# Patient Record
Sex: MALE | Race: WHITE | Employment: OTHER | URBAN - METROPOLITAN AREA
[De-identification: names, ages, dates, MRNs, and addresses within clinical notes are randomized per-mention and may not be internally consistent; named-entity substitution may affect disease eponyms.]

---

## 2017-05-01 PROBLEM — R06.00 PND (PAROXYSMAL NOCTURNAL DYSPNEA): Status: ACTIVE | Noted: 2017-05-01

## 2019-07-12 PROBLEM — I50.23 ACUTE ON CHRONIC SYSTOLIC CONGESTIVE HEART FAILURE (HCC): Status: ACTIVE | Noted: 2019-07-12

## 2019-07-12 PROBLEM — Z95.1 HX OF CABG: Status: ACTIVE | Noted: 2019-07-12

## 2019-07-13 ENCOUNTER — HOSPITAL ENCOUNTER (INPATIENT)
Age: 84
LOS: 6 days | Discharge: NURSING, REHAB OR LONG-TERM CARE | DRG: 242 | End: 2019-07-19
Attending: INTERNAL MEDICINE | Admitting: INTERNAL MEDICINE
Payer: MEDICARE

## 2019-07-13 ENCOUNTER — APPOINTMENT (OUTPATIENT)
Dept: GENERAL RADIOLOGY | Age: 84
DRG: 242 | End: 2019-07-13
Attending: NURSE PRACTITIONER
Payer: MEDICARE

## 2019-07-13 PROBLEM — R00.1 SYMPTOMATIC BRADYCARDIA: Status: ACTIVE | Noted: 2019-07-13

## 2019-07-13 PROBLEM — R00.1 BRADYCARDIA: Status: ACTIVE | Noted: 2019-07-13

## 2019-07-13 LAB
ANION GAP SERPL CALC-SCNC: 6 MMOL/L (ref 7–16)
APPEARANCE UR: ABNORMAL
APTT PPP: 99.7 SEC (ref 24.7–39.8)
ATRIAL RATE: 61 BPM
BACTERIA URNS QL MICRO: NORMAL /HPF
BILIRUB UR QL: NEGATIVE
BUN SERPL-MCNC: 18 MG/DL (ref 8–23)
CALCIUM SERPL-MCNC: 8.2 MG/DL (ref 8.3–10.4)
CALCULATED P AXIS, ECG09: 45 DEGREES
CALCULATED R AXIS, ECG10: -42 DEGREES
CALCULATED T AXIS, ECG11: -104 DEGREES
CASTS URNS QL MICRO: 0 /LPF
CHLORIDE SERPL-SCNC: 103 MMOL/L (ref 98–107)
CO2 SERPL-SCNC: 26 MMOL/L (ref 21–32)
COLOR UR: ABNORMAL
CREAT SERPL-MCNC: 1.07 MG/DL (ref 0.8–1.5)
CRYSTALS URNS QL MICRO: 0 /LPF
DIAGNOSIS, 93000: NORMAL
EPI CELLS #/AREA URNS HPF: 0 /HPF
ERYTHROCYTE [DISTWIDTH] IN BLOOD BY AUTOMATED COUNT: 15.5 % (ref 11.9–14.6)
GLUCOSE SERPL-MCNC: 86 MG/DL (ref 65–100)
GLUCOSE UR STRIP.AUTO-MCNC: NEGATIVE MG/DL
HCT VFR BLD AUTO: 37.9 % (ref 41.1–50.3)
HGB BLD-MCNC: 12.7 G/DL (ref 13.6–17.2)
HGB UR QL STRIP: ABNORMAL
INR PPP: 2
KETONES UR QL STRIP.AUTO: ABNORMAL MG/DL
LEUKOCYTE ESTERASE UR QL STRIP.AUTO: ABNORMAL
MAGNESIUM SERPL-MCNC: 2.5 MG/DL (ref 1.8–2.4)
MCH RBC QN AUTO: 29.7 PG (ref 26.1–32.9)
MCHC RBC AUTO-ENTMCNC: 33.5 G/DL (ref 31.4–35)
MCV RBC AUTO: 88.8 FL (ref 79.6–97.8)
MUCOUS THREADS URNS QL MICRO: 0 /LPF
NITRITE UR QL STRIP.AUTO: POSITIVE
NRBC # BLD: 0 K/UL (ref 0–0.2)
OTHER OBSERVATIONS,UCOM: NORMAL
P-R INTERVAL, ECG05: 222 MS
PH UR STRIP: 5.5 [PH] (ref 5–9)
PLATELET # BLD AUTO: 87 K/UL (ref 150–450)
PMV BLD AUTO: 13 FL (ref 9.4–12.3)
POTASSIUM SERPL-SCNC: 4.3 MMOL/L (ref 3.5–5.1)
PROT UR STRIP-MCNC: 100 MG/DL
PROTHROMBIN TIME: 22.2 SEC (ref 11.7–14.5)
Q-T INTERVAL, ECG07: 616 MS
QRS DURATION, ECG06: 186 MS
QTC CALCULATION (BEZET), ECG08: 514 MS
RBC # BLD AUTO: 4.27 M/UL (ref 4.23–5.6)
RBC #/AREA URNS HPF: >100 /HPF
SODIUM SERPL-SCNC: 135 MMOL/L (ref 136–145)
SP GR UR REFRACTOMETRY: 1.02 (ref 1–1.02)
TROPONIN I SERPL-MCNC: 0.89 NG/ML (ref 0.02–0.05)
TROPONIN I SERPL-MCNC: 0.93 NG/ML (ref 0.02–0.05)
TROPONIN I SERPL-MCNC: 1.02 NG/ML (ref 0.02–0.05)
TSH SERPL DL<=0.005 MIU/L-ACNC: 5.29 UIU/ML (ref 0.36–3.74)
UROBILINOGEN UR QL STRIP.AUTO: 1 EU/DL (ref 0.2–1)
VENTRICULAR RATE, ECG03: 42 BPM
WBC # BLD AUTO: 3.6 K/UL (ref 4.3–11.1)
WBC URNS QL MICRO: NORMAL /HPF
YEAST URNS QL MICRO: NORMAL

## 2019-07-13 PROCEDURE — 83735 ASSAY OF MAGNESIUM: CPT

## 2019-07-13 PROCEDURE — 02HL3JZ INSERTION OF PACEMAKER LEAD INTO LEFT VENTRICLE, PERCUTANEOUS APPROACH: ICD-10-PCS | Performed by: INTERNAL MEDICINE

## 2019-07-13 PROCEDURE — 94760 N-INVAS EAR/PLS OXIMETRY 1: CPT

## 2019-07-13 PROCEDURE — 02HK3JZ INSERTION OF PACEMAKER LEAD INTO RIGHT VENTRICLE, PERCUTANEOUS APPROACH: ICD-10-PCS | Performed by: INTERNAL MEDICINE

## 2019-07-13 PROCEDURE — 65660000000 HC RM CCU STEPDOWN

## 2019-07-13 PROCEDURE — 80048 BASIC METABOLIC PNL TOTAL CA: CPT

## 2019-07-13 PROCEDURE — 02H63JZ INSERTION OF PACEMAKER LEAD INTO RIGHT ATRIUM, PERCUTANEOUS APPROACH: ICD-10-PCS | Performed by: INTERNAL MEDICINE

## 2019-07-13 PROCEDURE — 77030021907 HC KT URIN FOL O&M -A

## 2019-07-13 PROCEDURE — 74011250637 HC RX REV CODE- 250/637: Performed by: NURSE PRACTITIONER

## 2019-07-13 PROCEDURE — 93005 ELECTROCARDIOGRAM TRACING: CPT | Performed by: NURSE PRACTITIONER

## 2019-07-13 PROCEDURE — 81015 MICROSCOPIC EXAM OF URINE: CPT

## 2019-07-13 PROCEDURE — 85730 THROMBOPLASTIN TIME PARTIAL: CPT

## 2019-07-13 PROCEDURE — 87086 URINE CULTURE/COLONY COUNT: CPT

## 2019-07-13 PROCEDURE — 77030034849

## 2019-07-13 PROCEDURE — 81003 URINALYSIS AUTO W/O SCOPE: CPT

## 2019-07-13 PROCEDURE — 85610 PROTHROMBIN TIME: CPT

## 2019-07-13 PROCEDURE — 71046 X-RAY EXAM CHEST 2 VIEWS: CPT

## 2019-07-13 PROCEDURE — 0JH606Z INSERTION OF PACEMAKER, DUAL CHAMBER INTO CHEST SUBCUTANEOUS TISSUE AND FASCIA, OPEN APPROACH: ICD-10-PCS | Performed by: INTERNAL MEDICINE

## 2019-07-13 PROCEDURE — 36415 COLL VENOUS BLD VENIPUNCTURE: CPT

## 2019-07-13 PROCEDURE — 84484 ASSAY OF TROPONIN QUANT: CPT

## 2019-07-13 PROCEDURE — 74011250636 HC RX REV CODE- 250/636: Performed by: INTERNAL MEDICINE

## 2019-07-13 PROCEDURE — 85027 COMPLETE CBC AUTOMATED: CPT

## 2019-07-13 PROCEDURE — 77010033678 HC OXYGEN DAILY

## 2019-07-13 PROCEDURE — 84443 ASSAY THYROID STIM HORMONE: CPT

## 2019-07-13 RX ORDER — ISOSORBIDE MONONITRATE 30 MG/1
30 TABLET, EXTENDED RELEASE ORAL DAILY
Status: DISCONTINUED | OUTPATIENT
Start: 2019-07-13 | End: 2019-07-19 | Stop reason: HOSPADM

## 2019-07-13 RX ORDER — ACETAMINOPHEN 325 MG/1
650 TABLET ORAL
Status: DISCONTINUED | OUTPATIENT
Start: 2019-07-13 | End: 2019-07-15 | Stop reason: SDUPTHER

## 2019-07-13 RX ORDER — GUAIFENESIN 100 MG/5ML
200 SOLUTION ORAL
Status: DISCONTINUED | OUTPATIENT
Start: 2019-07-13 | End: 2019-07-19 | Stop reason: HOSPADM

## 2019-07-13 RX ORDER — NITROGLYCERIN 0.4 MG/1
0.4 TABLET SUBLINGUAL
Status: DISCONTINUED | OUTPATIENT
Start: 2019-07-13 | End: 2019-07-19 | Stop reason: HOSPADM

## 2019-07-13 RX ORDER — MORPHINE SULFATE 2 MG/ML
2 INJECTION, SOLUTION INTRAMUSCULAR; INTRAVENOUS
Status: DISCONTINUED | OUTPATIENT
Start: 2019-07-13 | End: 2019-07-19 | Stop reason: HOSPADM

## 2019-07-13 RX ORDER — HEPARIN SODIUM 5000 [USP'U]/100ML
12-25 INJECTION, SOLUTION INTRAVENOUS
Status: DISCONTINUED | OUTPATIENT
Start: 2019-07-13 | End: 2019-07-15

## 2019-07-13 RX ORDER — NALOXONE HYDROCHLORIDE 0.4 MG/ML
0.4 INJECTION, SOLUTION INTRAMUSCULAR; INTRAVENOUS; SUBCUTANEOUS AS NEEDED
Status: DISCONTINUED | OUTPATIENT
Start: 2019-07-13 | End: 2019-07-19 | Stop reason: HOSPADM

## 2019-07-13 RX ORDER — WARFARIN SODIUM 5 MG/1
5 TABLET ORAL EVERY EVENING
Status: DISCONTINUED | OUTPATIENT
Start: 2019-07-13 | End: 2019-07-13

## 2019-07-13 RX ORDER — SODIUM CHLORIDE 0.9 % (FLUSH) 0.9 %
5-40 SYRINGE (ML) INJECTION EVERY 8 HOURS
Status: DISCONTINUED | OUTPATIENT
Start: 2019-07-13 | End: 2019-07-17

## 2019-07-13 RX ORDER — FUROSEMIDE 10 MG/ML
40 INJECTION INTRAMUSCULAR; INTRAVENOUS DAILY
Status: DISCONTINUED | OUTPATIENT
Start: 2019-07-14 | End: 2019-07-19 | Stop reason: HOSPADM

## 2019-07-13 RX ORDER — MAG HYDROX/ALUMINUM HYD/SIMETH 200-200-20
30 SUSPENSION, ORAL (FINAL DOSE FORM) ORAL
Status: DISCONTINUED | OUTPATIENT
Start: 2019-07-13 | End: 2019-07-19 | Stop reason: HOSPADM

## 2019-07-13 RX ORDER — DIPHENHYDRAMINE HCL 25 MG
50 CAPSULE ORAL
Status: DISCONTINUED | OUTPATIENT
Start: 2019-07-13 | End: 2019-07-19 | Stop reason: HOSPADM

## 2019-07-13 RX ORDER — DOXAZOSIN 1 MG/1
2 TABLET ORAL DAILY
Status: DISCONTINUED | OUTPATIENT
Start: 2019-07-13 | End: 2019-07-17

## 2019-07-13 RX ORDER — LISINOPRIL 5 MG/1
5 TABLET ORAL DAILY
Status: DISCONTINUED | OUTPATIENT
Start: 2019-07-14 | End: 2019-07-18

## 2019-07-13 RX ORDER — SIMVASTATIN 20 MG/1
20 TABLET, FILM COATED ORAL
Status: DISCONTINUED | OUTPATIENT
Start: 2019-07-13 | End: 2019-07-19 | Stop reason: HOSPADM

## 2019-07-13 RX ORDER — SODIUM CHLORIDE 0.9 % (FLUSH) 0.9 %
5-40 SYRINGE (ML) INJECTION AS NEEDED
Status: DISCONTINUED | OUTPATIENT
Start: 2019-07-13 | End: 2019-07-17

## 2019-07-13 RX ADMIN — GUAIFENESIN 200 MG: 100 SOLUTION ORAL at 23:28

## 2019-07-13 RX ADMIN — Medication 10 ML: at 01:39

## 2019-07-13 RX ADMIN — HEPARIN SODIUM 12 UNITS/KG/HR: 5000 INJECTION, SOLUTION INTRAVENOUS at 12:15

## 2019-07-13 RX ADMIN — SIMVASTATIN 20 MG: 20 TABLET, FILM COATED ORAL at 21:11

## 2019-07-13 RX ADMIN — Medication 5 ML: at 21:12

## 2019-07-13 RX ADMIN — Medication 10 ML: at 06:11

## 2019-07-13 NOTE — PROGRESS NOTES
Patient with yellow non-skid socks in place, call light within reach, bed in low locked position with rails up x2. Bed alarm on. Verbalizes understanding to call for any assistance. Problem: Falls - Risk of  Goal: *Absence of Falls  Description  Document Jeff Bustos Fall Risk and appropriate interventions in the flowsheet. Outcome: Progressing Towards Goal     Pt has intermittent AV Block.    Problem: Arrhythmia Pathway (Adult)  Goal: *Absence of arrhythmia  Outcome: Progressing Towards Goal

## 2019-07-13 NOTE — H&P
[facility] CARDIOLOGY History &Physical                 Primary Cardiologist: Dr. Tony Wang    Primary Care Physician: Dr. Trish Edmonds ([facility])    Admitting Physician: Dr. Tony Wang    Subjective:     Patient is a 82 y.o.  male with PMHx of AVR (s/p SJM mechanical 1990), CAD (s/p CABG), HTN, BPH and intermittent CHB/Mobitz II (previously asymptomatic and declined PPM) who presents in transfer from [facility] with bradycardia and vomiting. History is limited due to sign-language computer not working. An  is in route to the hospital. Pt is able to read and communicate through writing. He was just seen in the office by Dr. Tony Wang on 7/12/19 at 11:15AM and it was recommended that he be admitted for further w/u and poss PPM vs ICD for his high-grade AVB -- he refused admit. Per Dr. Tony Wang note:     Lisa Lazo is a 82 y.o. male seen for a follow up visit regarding the following: The patient has a hx of AVR ,CAD,HTN,and intermittent CHB. He has refused pacemaker implant in the past due to his \"asymptomatic condition\". Recently,he returned for scheduled follow up and reported severe fatigue,worsening weakness,limited endurance, occasional dizziness,and severe MCKEON. Echo on 7-5-2019 showed LV EF=20-25%,normal fx AVR,mild to moderate AVR,severe TR with RVSP=63 mmHg. The patient is deaf and his examination was aided by a . PLAN:   1. Complete heart block (HCC)/High grade AV block persists. Appears to be a Mobitz 2 AV block with severe irreversible symptomatic bradycardia. I advised admission th hospital and pacemaker /?ICD ( bivertricular device with CRT). Again,he declines admission but states that he will consider over the weekend and call on 7-15-19 for elective admission if he consents. If not,EP appointment ASAP. The patient has a hx of high grade intermittent CHB and Mobitz 2 AVB since 2016. In the past ,he has refused pacemaker implantation.     2. Atherosclerosis of native coronary artery of native heart without angina pectoris:Status unknown. Reports no angina. Consider a C to evaluate ischemic etiology for severe LV systolic dysfunction. Echo in 2016 reported LV EF was 40-45%.     3. Essential hypertension:stable.     4. S/P AVR:Stable on Warfarin.     5. Hx of CABG:No angina     6. Acute on chronic systolic congestive heart failure (HCC):Consider adding Entresto and Aldactone if renal fx is ok. Continue Lasix. Add Coreg or Toprol after ICD or pacemaker. Consider [facility] to assess CAD and graft patency. Past Medical History:   Diagnosis Date    Abnormal EKG 2014    Coronary atherosclerosis of native coronary vessel 2012    Essential hypertension 2014    H/O prosthetic aortic valve replacement     HLD (hyperlipidemia) 2012    Malignant neoplasm of prostate ([facility] [address] 2012    PND (paroxysmal nocturnal dyspnea) 2017    S/P AVR 2016    Second-degree heart block; MOBITZ I; WENCKEB 2016    Slow pulse     Valvular heart disease       Past Surgical History:   Procedure Laterality Date    HX AORTIC VALVE REPLACEMENT  1990    at [facility]      HX HEART VALVE SURGERY        Allergies   Allergen Reactions    Procaine Unknown (comments)     Social History     Tobacco Use    Smoking status: Former Smoker     Packs/day: 1.00     Years: 15.00     Pack years: 15.00     Last attempt to quit: 1988     Years since quittin.0    Smokeless tobacco: Never Used    Tobacco comment: pack every 3-4 days   Substance Use Topics    Alcohol use: No      FH: No family history on file.      Review of Systems -- unable to fully obtain due to lack of sign-language services at present      Objective:       Visit Vitals  /55 (BP 1 Location: Right arm, BP Patient Position: At rest)   Pulse 64   Temp 98 °F (36.7 °C)   Resp 18   Ht 5' 4\" (1.626 m)   Wt 63.5 kg (140 lb 1.6 oz)   SpO2 94%   BMI 24.05 kg/m²       No intake/output data recorded. No intake/output data recorded. Physical Exam:  General: Well Developed, Well Nourished, No Acute Distress, resting comfortably in bed   HEENT: pupils equal and round, no abnormalities noted, deaf  Neck: supple, no JVD, no carotid bruits  Heart: S1S2 with RRR with bradycardia, soft murmurs, no gallops  Lungs: Clear throughout auscultation bilaterally without adventitious sounds  Abd: soft, nontender, nondistended, with good bowel sounds  Ext: warm, 2+ edema, calves supple/nontender, pulses 2+ bilaterally  Skin: warm and dry  Psychiatric: Normal mood and affect  Neurologic: Alert and oriented X 3, moves all extremities, CN intact    ECG: ordered on admit    Data Review:    No results found for this or any previous visit (from the past 24 hour(s)). CXR: Report from [facility] with small R pleural effusion vs PNA -- will repeat in AM    ECHO on 7-5-2019 showed LV EF=20-25%, normal fx AVR, mild to moderate AVR, severe TR with RVSP=63 mmHg. Assessment/Plan:   Principal Problem:    Symptomatic bradycardia -- admit to tele, f/u labs/lytes, had recent ECHO, needs PPM      Active Problems:    Essential hypertension -- monitor BP, stable presently      Coronary atherosclerosis of native coronary vessel -- hx CABG in past      HLD (hyperlipidemia) -- cont statin, update lipids      Second-degree heart block; MOBITZ I; WENCKEB -- noted for several years, however - he has previously been asymptomatic. Now appears to be symptomatic -- needs PPM      S/P AVR -- mechanical valve, normal valve function last ECHO, daily INRs, will plan to cont Coumadin but plan to hold prior to PPM placement (last dose PTA on 7/12), may need heparin bridge pending timing of device therapy        ADDENDUM: 0234  Sign- came to assist with communication.  Spent ~30min with Pt explaining his current heart function and HR and why he needs a PPM. Discussed that he has \"heart failure\" and Pt is surprised and states that he did not know he had heart failure. Discussed optimal therapy for heart failure limited by his HR and heart block. He v/u. He states that he is scared to have a PPM.     He adds to the HPI:  He has been having difficulty sleeping. He is scared to sleep because he wakes up gasping for breath and often gets up, walks around and sleeps/dozes in his recliner. He denies CP. He reports SOB, orthopnea, PND and BLE edema. Edema has been persistent for 3 months despite taking 2 Lasix tabs in AM and 1 in PM. He denies any palpitations or syncope. He has had some dizziness at times - not much recently. He had an episode of vomiting today that he felt was due to a box of food he ate as he vomited and had diarrhea shortly after. However, this scared him and his wife made him go to the ED.          JOHN Blum  7/13/2019  11:58 PM

## 2019-07-13 NOTE — PROGRESS NOTES
Discussed pt HR 28 with MD Molina and Mary Carmen YEE. MD Yesy Cornell to come and discuss plan of care with patient. Will call  when MD arrives. Received order to hold PO meds and continue to monitor. Zoll at bedside, pads on patient. Pt resting in bed, asymptomatic at this time, pt able to sign and speak with primary RN.

## 2019-07-13 NOTE — PROGRESS NOTES
Bedside and Verbal shift change report given to Teodoro Gonzalez RN (oncoming nurse) by self Limmie Crosser nurse). Report included the following information SBAR, Kardex and MAR. Zoll at bedside, pads placed on patient chest. Heparin gtt verified with oncoming RN. Called and spoke with , PTT and troponin have been drawn for , results pending. Oncoming RN assuming care.

## 2019-07-13 NOTE — PROGRESS NOTES
TRANSFER - IN REPORT:    Verbal report received from Lalito Enriquez on Reyes Jones being received from [facility] for routine progression of care      Report consisted of patients Situation, Background, Assessment and Recommendations(SBAR). Information from the following report(s) SBAR, Kardex, ED Summary, Intake/Output, Recent Results and Cardiac Rhythm AV Block was reviewed with the receiving nurse. Opportunity for questions and clarification was provided. Assessment completed upon patients arrival to unit and care assumed. Pt attached to telemetry monitor. Pt asymptomatic, denies CP, nausea. Zoll pads on pt with low HR. Zoll at bedside. Oriented pt to room and the use of call light. Communicated with pt on the importance of calling for assistance prior to ambulation using paper and pen. Pt verbalized understanding. Bed alarm on.

## 2019-07-13 NOTE — PROGRESS NOTES
Dual skin assessment completed upon admission. It reveals the followin/13/19 0114   Skin Integumentary   Skin Integumentary (WDL) WDL    Pressure  Injury Documentation No Pressure Injury Noted-Pressure Ulcer Prevention Initiated   Skin Color Appropriate for ethnicity; Ecchymosis (comment)  (scattered bruisings BUE)   Skin Condition/Temp Dry;Warm;Fragile;Flaky   Skin Integrity Cracked  (heels dry and slightly cracked)   Turgor Epidermis thin w/ loss of subcut tissue   Hair Growth Present   Varicosities Absent     Skin is fragile. Sacrum and heels are intact. Heels are dry and flaky. Sacrum is intact without breakdown. Scattered bruising noted BUE. Trace/pitting 1+ edema noted BLE.

## 2019-07-13 NOTE — PROGRESS NOTES
[facility] CARDIOLOGY PROGRESS NOTE           7/13/2019 11:05 AM    Admit Date: 7/13/2019      Subjective:   Feels ok    ROS:  GEN:  No fever or chills  Cardiovascular:  As noted above:no CP. Complains of MCKEON  Pulmonary:  As noted above:MCKEON occurs. Neuro:  No new focal motor or sensory loss    Objective:      Vitals:    07/13/19 0111 07/13/19 0523 07/13/19 0720 07/13/19 0910   BP: 137/55 124/58  136/50   Pulse: 64 (!) 41  (!) 28   Resp: 18 18  17   Temp: 98 °F (36.7 °C) 97.9 °F (36.6 °C)  97.5 °F (36.4 °C)   SpO2: 94% 94% 96% 98%   Weight:       Height:           Physical Exam:  General-no distress  Neck- supple, no JVD  CV-  Slow irregular rate and rhythm with 1/6 YORDAN  Lung- clear bilaterally  Abd- soft, nontender, nondistended  Ext- 1-2+ edema bilaterally. Skin- warm and dry  Psychiatric:  Normal mood and affect. Neurologic:  Alert and oriented X 3      Data Review:   Recent Labs     07/13/19  0145   *   K 4.3   MG 2.5*   BUN 18   CREA 1.07   GLU 86   WBC 3.6*   HGB 12.7*   HCT 37.9*   PLT 87*   INR 2.0       TELEMETRY:  Complete third degree AVblock. Assessment/Plan:     Principal Problem:    Symptomatic bradycardia (7/13/2019):Agreeable to pacer or ICD on Monday by EP. Active Problems:    Essential hypertension (1/9/2014): Stable adding Lisinopril. Coronary atherosclerosis of native coronary vessel (7/26/2012):no angina. Consider future [facility] with worsening EF. HLD (hyperlipidemia) (7/26/2012)      Second-degree heart block; MOBITZ I; WENCKEB (6/29/2016)      S/P AVR (11/1/2016): Stopping Warfarin and starting heparin with anticipation of pacer/ICD. Recent echo showed normal fx. Bradycardia (7/13/2019):Complete heart block is present. Pacemaker or ICD on Monday. Systolic CHF with underlying severe cardiomyopathy with LV EF<25%. . Add Lisinopril and Lasix. BMP in am.            Colton Acosta MD  7/13/2019 11:05 AM

## 2019-07-13 NOTE — PROGRESS NOTES
Mendoza catheter placed per MD Jonel Mccloud, pt states he has not been able to urinate in 3-4 days. Patient had urge to void but denies any pressure or feelings of pain in his abdomen/bladder. Mendoza inserted, patient drained 350ml immediately of keshia foul smelling urine. Received order for cardiac diet and heparin gtt with no bolus. Coumadin held for possible pace maker procedure on Monday at the earliest.     Spoke with patient's sister in law on phone who had patient security code. Per  Sister in law Susannah Manzo patient's only other family is his wife who is also mute and deaf and does not drive or have a phone. Sister-in-law's number placed in chart.

## 2019-07-13 NOTE — PROGRESS NOTES
Notified MD Radha Abbasi of UA results and that patient having some blood in urine, urine in grimes bag clear red. MD Radha Abbasi went to bedside to assess, no new orders at this time. MD aware patient on heparin gtt.

## 2019-07-13 NOTE — PROGRESS NOTES
Verbal bedside report received from Kathrin Galvan RN. Assumed care of patient. Heparin IV drip verified at bedside with outgoing RN. Zoll pads intact and on pt. Zoll at bedside.

## 2019-07-13 NOTE — PROGRESS NOTES
Troponin resulted 1.02. Patient denies any chest pain/pressure. Paged MD Ruba Alberto to notify him. Waiting on return call. Received order to get troponin 8 hours after last troponin. MD made aware that patient states he had not urinated for 3-4 days prior to admission and grimes insertion. Urine was keshia and foul smelling. Received order to obtain UA and urine culture.

## 2019-07-13 NOTE — PROGRESS NOTES
Bedside and Verbal shift change report given to self (oncoming nurse) by Teodoro Gonzalez RN (offgoing nurse). Report included the following information SBAR, Kardex and MAR. Pt resting in bed, zoll at bedside, HR sinus isac on monitor HR 29-30's.

## 2019-07-14 LAB
ANION GAP SERPL CALC-SCNC: 7 MMOL/L (ref 7–16)
APTT PPP: 114.4 SEC (ref 24.7–39.8)
APTT PPP: 122.1 SEC (ref 24.7–39.8)
APTT PPP: 128.1 SEC (ref 24.7–39.8)
BUN SERPL-MCNC: 18 MG/DL (ref 8–23)
CALCIUM SERPL-MCNC: 8.1 MG/DL (ref 8.3–10.4)
CHLORIDE SERPL-SCNC: 102 MMOL/L (ref 98–107)
CHOLEST SERPL-MCNC: 109 MG/DL
CO2 SERPL-SCNC: 25 MMOL/L (ref 21–32)
CREAT SERPL-MCNC: 0.99 MG/DL (ref 0.8–1.5)
ERYTHROCYTE [DISTWIDTH] IN BLOOD BY AUTOMATED COUNT: 15.6 % (ref 11.9–14.6)
GLUCOSE SERPL-MCNC: 91 MG/DL (ref 65–100)
HCT VFR BLD AUTO: 39.3 % (ref 41.1–50.3)
HDLC SERPL-MCNC: 44 MG/DL (ref 40–60)
HDLC SERPL: 2.5 {RATIO}
HGB BLD-MCNC: 13.3 G/DL (ref 13.6–17.2)
INR PPP: 1.9
LDLC SERPL CALC-MCNC: 57.8 MG/DL
LIPID PROFILE,FLP: NORMAL
MCH RBC QN AUTO: 30 PG (ref 26.1–32.9)
MCHC RBC AUTO-ENTMCNC: 33.8 G/DL (ref 31.4–35)
MCV RBC AUTO: 88.5 FL (ref 79.6–97.8)
NRBC # BLD: 0 K/UL (ref 0–0.2)
PLATELET # BLD AUTO: 92 K/UL (ref 150–450)
PMV BLD AUTO: 13 FL (ref 9.4–12.3)
POTASSIUM SERPL-SCNC: 4.3 MMOL/L (ref 3.5–5.1)
PROTHROMBIN TIME: 21.5 SEC (ref 11.7–14.5)
RBC # BLD AUTO: 4.44 M/UL (ref 4.23–5.6)
SODIUM SERPL-SCNC: 134 MMOL/L (ref 136–145)
TRIGL SERPL-MCNC: 36 MG/DL (ref 35–150)
VLDLC SERPL CALC-MCNC: 7.2 MG/DL (ref 6–23)
WBC # BLD AUTO: 6.7 K/UL (ref 4.3–11.1)

## 2019-07-14 PROCEDURE — 80048 BASIC METABOLIC PNL TOTAL CA: CPT

## 2019-07-14 PROCEDURE — 74011250637 HC RX REV CODE- 250/637: Performed by: INTERNAL MEDICINE

## 2019-07-14 PROCEDURE — 74011250637 HC RX REV CODE- 250/637: Performed by: NURSE PRACTITIONER

## 2019-07-14 PROCEDURE — 80061 LIPID PANEL: CPT

## 2019-07-14 PROCEDURE — 85730 THROMBOPLASTIN TIME PARTIAL: CPT

## 2019-07-14 PROCEDURE — 36415 COLL VENOUS BLD VENIPUNCTURE: CPT

## 2019-07-14 PROCEDURE — 85027 COMPLETE CBC AUTOMATED: CPT

## 2019-07-14 PROCEDURE — 65660000000 HC RM CCU STEPDOWN

## 2019-07-14 PROCEDURE — 74011250636 HC RX REV CODE- 250/636: Performed by: INTERNAL MEDICINE

## 2019-07-14 PROCEDURE — 85610 PROTHROMBIN TIME: CPT

## 2019-07-14 RX ORDER — SIMETHICONE 80 MG
80 TABLET,CHEWABLE ORAL
Status: DISCONTINUED | OUTPATIENT
Start: 2019-07-14 | End: 2019-07-19 | Stop reason: HOSPADM

## 2019-07-14 RX ADMIN — FUROSEMIDE 40 MG: 10 INJECTION, SOLUTION INTRAMUSCULAR; INTRAVENOUS at 08:24

## 2019-07-14 RX ADMIN — LISINOPRIL 5 MG: 5 TABLET ORAL at 11:34

## 2019-07-14 RX ADMIN — DOXAZOSIN 2 MG: 1 TABLET ORAL at 08:25

## 2019-07-14 RX ADMIN — Medication 10 ML: at 06:03

## 2019-07-14 RX ADMIN — SIMETHICONE CHEW TAB 80 MG 80 MG: 80 TABLET ORAL at 23:52

## 2019-07-14 RX ADMIN — SIMVASTATIN 20 MG: 20 TABLET, FILM COATED ORAL at 22:10

## 2019-07-14 RX ADMIN — HEPARIN SODIUM 10 UNITS/KG/HR: 5000 INJECTION, SOLUTION INTRAVENOUS at 19:12

## 2019-07-14 RX ADMIN — ISOSORBIDE MONONITRATE 30 MG: 30 TABLET, EXTENDED RELEASE ORAL at 11:31

## 2019-07-14 RX ADMIN — ALUMINUM HYDROXIDE, MAGNESIUM HYDROXIDE, AND SIMETHICONE 30 ML: 200; 200; 20 SUSPENSION ORAL at 22:07

## 2019-07-14 RX ADMIN — Medication 5 ML: at 22:10

## 2019-07-14 NOTE — PROGRESS NOTES
MEWS score noted to be 3 due to low HR in 30s. Daniella Huber, charge RN informed and assessed patient. Patient resting in bed asymptomatic, denies distress. Pt has been in SB with intermittent heart block since admission. Zoll pads placed on pt with zoll at bedside. Will continue to monitor.

## 2019-07-14 NOTE — PROGRESS NOTES
Bedside and Verbal shift change report given to [address] [place] [address] (oncoming nurse) by self Lenard Olvera nurse). Report included the following information SBAR, Kardex and MAR. Heparin gtt verified.

## 2019-07-14 NOTE — PROGRESS NOTES
Patient with yellow non-skid socks in place, call light within reach, bed in low locked position with rails up x2. Bed alarm on. Verbalizes understanding to call for any assistance. Problem: Falls - Risk of  Goal: *Absence of Falls  Description  Document Radha Bonilla Fall Risk and appropriate interventions in the flowsheet. Outcome: Progressing Towards Goal     Pt asymptomatic, in SB with intermittent heart block. Problem: Arrhythmia Pathway (Adult)  Goal: *Absence of arrhythmia  Outcome: Progressing Towards Goal     Pt O2 sat >90s on RA.     Problem: Breathing Pattern - Ineffective  Goal: *Absence of hypoxia  Outcome: Progressing Towards Goal

## 2019-07-14 NOTE — PROGRESS NOTES
Verbal bedside report given to joshua Frank RN. Patient's situation, background, assessment and recommendations provided. Opportunity for questions provided. Oncoming RN assumed care of patient. Heparin IV drip verified at bedside with oncoming RN. Bed alarm on.

## 2019-07-14 NOTE — PROGRESS NOTES
[facility] CARDIOLOGY PROGRESS NOTE           7/14/2019 10:15 AM    Admit Date: 7/13/2019      Subjective:   No complaints. ROS:  GEN:  No fever or chills  Cardiovascular:  As noted above:no CP. MCKEON persists. Pulmonary:  As noted above:no cough. Neuro:  No new focal motor or sensory loss    Objective:      Vitals:    07/14/19 0055 07/14/19 0504 07/14/19 0815 07/14/19 0824   BP: 125/57 120/45 148/58 148/58   Pulse: (!) 36 (!) 33 (!) 39 (!) 38   Resp: 15 16  17   Temp: 99 °F (37.2 °C) 97.9 °F (36.6 °C)  98.7 °F (37.1 °C)   SpO2: 95% 96%  92%   Weight:  64.5 kg (142 lb 4.8 oz)     Height:           Physical Exam:  General-no distress  Neck- supple, no JVD  CV-irregular rate and rhythm with 1/6 YORDAN  Lung- clear bilaterally  Abd- soft, nontender, nondistended  Ext- 1+ edema bilaterally. Skin- warm and dry  Psychiatric:  Normal mood and affect. Neurologic:  Alert and oriented X 3      Data Review:   Recent Labs     07/14/19  0235 07/13/19  0145   * 135*   K 4.3 4.3   MG  --  2.5*   BUN 18 18   CREA 0.99 1.07   GLU 91 86   WBC 6.7 3.6*   HGB 13.3* 12.7*   HCT 39.3* 37.9*   PLT 92* 87*   INR 1.9 2.0   TRIGL 36  --    HDL 44  --        TELEMETRY: CHB    Assessment/Plan:     Principal Problem:    Symptomatic bradycardia (7/13/2019)/CHB:EP to see in am.Hopefully,pacer or ICD tomorrow. Active Problems:    Essential hypertension (1/9/2014). Continue current medications. Coronary atherosclerosis of native coronary vessel (7/26/2012). Continue current medications. Consider future ischemic workup with drop in LV EF. S/P AVR (11/1/2016): Off Warfarin. Continue heaprin. Function is stable on recent echo      Cardiomyopathy with symptomatic systolic CHF. Lisinopril and Lasix added.                 Diana Love MD  7/14/2019 10:15 AM

## 2019-07-14 NOTE — PROGRESS NOTES
Bedside and Verbal shift change report given to self (oncoming nurse) by Teodoro Gonzalez RN (offgoing nurse). Report included the following information SBAR, Kardex and MAR. Heparin gtt verified with offgoing RN, pt denies any pain or distress. Mendoza draining keshia/red colored urine. Will continue to monitor.

## 2019-07-15 ENCOUNTER — APPOINTMENT (OUTPATIENT)
Dept: GENERAL RADIOLOGY | Age: 84
DRG: 242 | End: 2019-07-15
Attending: INTERNAL MEDICINE
Payer: MEDICARE

## 2019-07-15 ENCOUNTER — ANESTHESIA EVENT (OUTPATIENT)
Dept: SURGERY | Age: 84
DRG: 242 | End: 2019-07-15
Payer: MEDICARE

## 2019-07-15 ENCOUNTER — ANESTHESIA (OUTPATIENT)
Dept: SURGERY | Age: 84
DRG: 242 | End: 2019-07-15
Payer: MEDICARE

## 2019-07-15 LAB
ANION GAP SERPL CALC-SCNC: 7 MMOL/L (ref 7–16)
APTT PPP: 103.7 SEC (ref 24.7–39.8)
APTT PPP: 40.5 SEC (ref 24.7–39.8)
APTT PPP: 95.2 SEC (ref 24.7–39.8)
ATRIAL RATE: 41 BPM
BUN SERPL-MCNC: 14 MG/DL (ref 8–23)
CALCIUM SERPL-MCNC: 8 MG/DL (ref 8.3–10.4)
CALCULATED R AXIS, ECG10: -116 DEGREES
CALCULATED T AXIS, ECG11: 48 DEGREES
CHLORIDE SERPL-SCNC: 103 MMOL/L (ref 98–107)
CO2 SERPL-SCNC: 26 MMOL/L (ref 21–32)
CREAT SERPL-MCNC: 0.87 MG/DL (ref 0.8–1.5)
DIAGNOSIS, 93000: NORMAL
ERYTHROCYTE [DISTWIDTH] IN BLOOD BY AUTOMATED COUNT: 15.7 % (ref 11.9–14.6)
GLUCOSE SERPL-MCNC: 83 MG/DL (ref 65–100)
HCT VFR BLD AUTO: 38.2 % (ref 41.1–50.3)
HGB BLD-MCNC: 12.6 G/DL (ref 13.6–17.2)
INR PPP: 1.8
MCH RBC QN AUTO: 30 PG (ref 26.1–32.9)
MCHC RBC AUTO-ENTMCNC: 33 G/DL (ref 31.4–35)
MCV RBC AUTO: 91 FL (ref 79.6–97.8)
NRBC # BLD: 0 K/UL (ref 0–0.2)
P-R INTERVAL, ECG05: 182 MS
PLATELET # BLD AUTO: 85 K/UL (ref 150–450)
PMV BLD AUTO: 13.1 FL (ref 9.4–12.3)
POTASSIUM SERPL-SCNC: 4 MMOL/L (ref 3.5–5.1)
PROTHROMBIN TIME: 20.3 SEC (ref 11.7–14.5)
Q-T INTERVAL, ECG07: 508 MS
QRS DURATION, ECG06: 162 MS
QTC CALCULATION (BEZET), ECG08: 515 MS
RBC # BLD AUTO: 4.2 M/UL (ref 4.23–5.6)
SODIUM SERPL-SCNC: 136 MMOL/L (ref 136–145)
VENTRICULAR RATE, ECG03: 62 BPM
WBC # BLD AUTO: 5 K/UL (ref 4.3–11.1)

## 2019-07-15 PROCEDURE — 85730 THROMBOPLASTIN TIME PARTIAL: CPT

## 2019-07-15 PROCEDURE — 80048 BASIC METABOLIC PNL TOTAL CA: CPT

## 2019-07-15 PROCEDURE — 36415 COLL VENOUS BLD VENIPUNCTURE: CPT

## 2019-07-15 PROCEDURE — 85027 COMPLETE CBC AUTOMATED: CPT

## 2019-07-15 PROCEDURE — 77030018547 HC SUT ETHBND1 J&J -B

## 2019-07-15 PROCEDURE — C2621 PMKR, OTHER THAN SING/DUAL: HCPCS

## 2019-07-15 PROCEDURE — C1898 LEAD, PMKR, OTHER THAN TRANS: HCPCS

## 2019-07-15 PROCEDURE — 76060000035 HC ANESTHESIA 2 TO 2.5 HR: Performed by: INTERNAL MEDICINE

## 2019-07-15 PROCEDURE — 74011636320 HC RX REV CODE- 636/320: Performed by: INTERNAL MEDICINE

## 2019-07-15 PROCEDURE — 94760 N-INVAS EAR/PLS OXIMETRY 1: CPT

## 2019-07-15 PROCEDURE — C1892 INTRO/SHEATH,FIXED,PEEL-AWAY: HCPCS

## 2019-07-15 PROCEDURE — 74011250636 HC RX REV CODE- 250/636

## 2019-07-15 PROCEDURE — A4565 SLINGS: HCPCS

## 2019-07-15 PROCEDURE — 74011250637 HC RX REV CODE- 250/637: Performed by: NURSE PRACTITIONER

## 2019-07-15 PROCEDURE — 77030019605

## 2019-07-15 PROCEDURE — 77030012935 HC DRSG AQUACEL BMS -B

## 2019-07-15 PROCEDURE — C1894 INTRO/SHEATH, NON-LASER: HCPCS

## 2019-07-15 PROCEDURE — 93005 ELECTROCARDIOGRAM TRACING: CPT | Performed by: INTERNAL MEDICINE

## 2019-07-15 PROCEDURE — 77030037400 HC ADH TISS HI VISC EXOFIN CHMP -B

## 2019-07-15 PROCEDURE — C1751 CATH, INF, PER/CENT/MIDLINE: HCPCS

## 2019-07-15 PROCEDURE — 77030022704 HC SUT VLOC COVD -B

## 2019-07-15 PROCEDURE — C1769 GUIDE WIRE: HCPCS

## 2019-07-15 PROCEDURE — 71045 X-RAY EXAM CHEST 1 VIEW: CPT

## 2019-07-15 PROCEDURE — 65660000000 HC RM CCU STEPDOWN

## 2019-07-15 PROCEDURE — 74011250637 HC RX REV CODE- 250/637: Performed by: INTERNAL MEDICINE

## 2019-07-15 PROCEDURE — 74011000272 HC RX REV CODE- 272: Performed by: INTERNAL MEDICINE

## 2019-07-15 PROCEDURE — C1900 LEAD, CORONARY VENOUS: HCPCS

## 2019-07-15 PROCEDURE — 33225 L VENTRIC PACING LEAD ADD-ON: CPT

## 2019-07-15 PROCEDURE — 74011000250 HC RX REV CODE- 250: Performed by: INTERNAL MEDICINE

## 2019-07-15 PROCEDURE — 85610 PROTHROMBIN TIME: CPT

## 2019-07-15 PROCEDURE — C1887 CATHETER, GUIDING: HCPCS

## 2019-07-15 PROCEDURE — 33208 INSRT HEART PM ATRIAL & VENT: CPT

## 2019-07-15 PROCEDURE — 74011250636 HC RX REV CODE- 250/636: Performed by: INTERNAL MEDICINE

## 2019-07-15 PROCEDURE — 36005 INJECTION EXT VENOGRAPHY: CPT

## 2019-07-15 RX ORDER — PROPOFOL 10 MG/ML
INJECTION, EMULSION INTRAVENOUS AS NEEDED
Status: DISCONTINUED | OUTPATIENT
Start: 2019-07-15 | End: 2019-07-15 | Stop reason: HOSPADM

## 2019-07-15 RX ORDER — ONDANSETRON 2 MG/ML
4 INJECTION INTRAMUSCULAR; INTRAVENOUS
Status: DISCONTINUED | OUTPATIENT
Start: 2019-07-15 | End: 2019-07-19 | Stop reason: HOSPADM

## 2019-07-15 RX ORDER — WARFARIN SODIUM 5 MG/1
5 TABLET ORAL EVERY EVENING
Status: DISCONTINUED | OUTPATIENT
Start: 2019-07-15 | End: 2019-07-19 | Stop reason: HOSPADM

## 2019-07-15 RX ORDER — PROPOFOL 10 MG/ML
INJECTION, EMULSION INTRAVENOUS
Status: DISCONTINUED | OUTPATIENT
Start: 2019-07-15 | End: 2019-07-15 | Stop reason: HOSPADM

## 2019-07-15 RX ORDER — DOCUSATE SODIUM 100 MG/1
100 CAPSULE, LIQUID FILLED ORAL
Status: DISCONTINUED | OUTPATIENT
Start: 2019-07-15 | End: 2019-07-19 | Stop reason: HOSPADM

## 2019-07-15 RX ORDER — CARVEDILOL 3.12 MG/1
3.12 TABLET ORAL 2 TIMES DAILY WITH MEALS
Status: DISCONTINUED | OUTPATIENT
Start: 2019-07-15 | End: 2019-07-15

## 2019-07-15 RX ORDER — SODIUM CHLORIDE 0.9 % (FLUSH) 0.9 %
5-40 SYRINGE (ML) INJECTION AS NEEDED
Status: DISCONTINUED | OUTPATIENT
Start: 2019-07-15 | End: 2019-07-19 | Stop reason: HOSPADM

## 2019-07-15 RX ORDER — SODIUM CHLORIDE, SODIUM LACTATE, POTASSIUM CHLORIDE, CALCIUM CHLORIDE 600; 310; 30; 20 MG/100ML; MG/100ML; MG/100ML; MG/100ML
INJECTION, SOLUTION INTRAVENOUS
Status: DISCONTINUED | OUTPATIENT
Start: 2019-07-15 | End: 2019-07-15 | Stop reason: HOSPADM

## 2019-07-15 RX ORDER — CARVEDILOL 6.25 MG/1
6.25 TABLET ORAL 2 TIMES DAILY WITH MEALS
Status: DISCONTINUED | OUTPATIENT
Start: 2019-07-16 | End: 2019-07-18

## 2019-07-15 RX ORDER — CEFAZOLIN SODIUM 1 G/3ML
INJECTION, POWDER, FOR SOLUTION INTRAMUSCULAR; INTRAVENOUS AS NEEDED
Status: DISCONTINUED | OUTPATIENT
Start: 2019-07-15 | End: 2019-07-15 | Stop reason: HOSPADM

## 2019-07-15 RX ORDER — LIDOCAINE HYDROCHLORIDE 20 MG/ML
INJECTION, SOLUTION EPIDURAL; INFILTRATION; INTRACAUDAL; PERINEURAL AS NEEDED
Status: DISCONTINUED | OUTPATIENT
Start: 2019-07-15 | End: 2019-07-15 | Stop reason: HOSPADM

## 2019-07-15 RX ORDER — SODIUM CHLORIDE 0.9 % (FLUSH) 0.9 %
5-40 SYRINGE (ML) INJECTION EVERY 8 HOURS
Status: DISCONTINUED | OUTPATIENT
Start: 2019-07-15 | End: 2019-07-19 | Stop reason: HOSPADM

## 2019-07-15 RX ORDER — BUPIVACAINE HYDROCHLORIDE AND EPINEPHRINE 5; 5 MG/ML; UG/ML
60 INJECTION, SOLUTION EPIDURAL; INTRACAUDAL; PERINEURAL ONCE
Status: COMPLETED | OUTPATIENT
Start: 2019-07-15 | End: 2019-07-15

## 2019-07-15 RX ORDER — HYDROCODONE BITARTRATE AND ACETAMINOPHEN 5; 325 MG/1; MG/1
1 TABLET ORAL
Status: DISCONTINUED | OUTPATIENT
Start: 2019-07-15 | End: 2019-07-19 | Stop reason: HOSPADM

## 2019-07-15 RX ORDER — ACETAMINOPHEN 325 MG/1
650 TABLET ORAL
Status: DISCONTINUED | OUTPATIENT
Start: 2019-07-15 | End: 2019-07-19 | Stop reason: HOSPADM

## 2019-07-15 RX ADMIN — IOPAMIDOL 50 ML: 755 INJECTION, SOLUTION INTRAVENOUS at 15:19

## 2019-07-15 RX ADMIN — Medication 10 ML: at 22:15

## 2019-07-15 RX ADMIN — ISOSORBIDE MONONITRATE 30 MG: 30 TABLET, EXTENDED RELEASE ORAL at 08:29

## 2019-07-15 RX ADMIN — SODIUM CHLORIDE, SODIUM LACTATE, POTASSIUM CHLORIDE, CALCIUM CHLORIDE: 600; 310; 30; 20 INJECTION, SOLUTION INTRAVENOUS at 13:31

## 2019-07-15 RX ADMIN — CARVEDILOL 3.12 MG: 3.12 TABLET, FILM COATED ORAL at 17:34

## 2019-07-15 RX ADMIN — PROPOFOL 100 MCG/KG/MIN: 10 INJECTION, EMULSION INTRAVENOUS at 13:37

## 2019-07-15 RX ADMIN — BUPIVACAINE HYDROCHLORIDE AND EPINEPHRINE BITARTRATE 150 MG: 5; .005 INJECTION, SOLUTION EPIDURAL; INTRACAUDAL; PERINEURAL at 13:37

## 2019-07-15 RX ADMIN — LISINOPRIL 5 MG: 5 TABLET ORAL at 08:30

## 2019-07-15 RX ADMIN — DOXAZOSIN 2 MG: 1 TABLET ORAL at 08:29

## 2019-07-15 RX ADMIN — WARFARIN SODIUM 5 MG: 5 TABLET ORAL at 17:34

## 2019-07-15 RX ADMIN — FUROSEMIDE 40 MG: 10 INJECTION, SOLUTION INTRAMUSCULAR; INTRAVENOUS at 08:30

## 2019-07-15 RX ADMIN — BACITRACIN: 50000 INJECTION, POWDER, FOR SOLUTION INTRAMUSCULAR at 13:37

## 2019-07-15 RX ADMIN — PROPOFOL 20 MG: 10 INJECTION, EMULSION INTRAVENOUS at 13:37

## 2019-07-15 RX ADMIN — LIDOCAINE HYDROCHLORIDE 80 MG: 20 INJECTION, SOLUTION EPIDURAL; INFILTRATION; INTRACAUDAL; PERINEURAL at 13:37

## 2019-07-15 RX ADMIN — Medication 5 ML: at 17:41

## 2019-07-15 RX ADMIN — CEFAZOLIN SODIUM 2 G: 1 INJECTION, POWDER, FOR SOLUTION INTRAMUSCULAR; INTRAVENOUS at 13:43

## 2019-07-15 RX ADMIN — HYDROCODONE BITARTRATE AND ACETAMINOPHEN 1 TABLET: 5; 325 TABLET ORAL at 22:14

## 2019-07-15 RX ADMIN — SIMVASTATIN 20 MG: 20 TABLET, FILM COATED ORAL at 22:14

## 2019-07-15 NOTE — PROGRESS NOTES
available 24/7 using the [facility] [employer] for Video Remote Interpreting (VRI)   For on-site interpreters please call [phone]. After hours and weekends, please call the on-call number.

## 2019-07-15 NOTE — PROGRESS NOTES
Care Management Interventions  PCP Verified by CM: Yes(has appointment with new PCP August 19)  Palliative Care Criteria Met (RRAT>21 & CHF Dx)?: No(RRAT 13 Dx Bradycardia )  Transition of Care Consult (CM Consult): Discharge Planning  Discharge Durable Medical Equipment: No(none)  Physical Therapy Consult: No  Occupational Therapy Consult: No  Speech Therapy Consult: No  Current Support Network: Lives with Spouse  Confirm Follow Up Transport: Self  Plan discussed with Pt/Family/Caregiver: Yes  Freedom of Choice Offered: Yes  Discharge Location  Discharge Placement: Home  Met with patient and his sister for d/c planning. Patient alert and oriented and is deaf. He can read written word and lips. He is independent of his ADL's and lives with his wife in mobile home. He requires no DME at home and has needed transportation. He has Medicare and [employer] for medications. Current d/c plan is home with wife when medically stable. CM following.

## 2019-07-15 NOTE — PROGRESS NOTES
TRANSFER - IN REPORT:    Verbal report received from [facility] RN(name) on Marino Harmon  being received from EP lab(unit) for routine progression of care      Report consisted of patients Situation, Background, Assessment and   Recommendations(SBAR). Information from the following report(s) Procedure Summary was reviewed with the receiving nurse. Opportunity for questions and clarification was provided. Assessment completed upon patients arrival to unit and care assumed.

## 2019-07-15 NOTE — PROGRESS NOTES
TRANSFER - IN REPORT:    Verbal report received from [facility] Room on Jaiden Espinosa being received from [facility] for routine progression of care. Report consisted of patients Situation, Background, Assessment and Recommendations(SBAR). Information from the following report(s) SBAR, Kardex, Procedure Summary and MAR was reviewed. Opportunity for questions and clarification was provided.

## 2019-07-15 NOTE — PROGRESS NOTES
To request a  after 4:30 p.m., please call Christiana at [phone]. Thank you,      Beatrice Canales, [address] [facility] [address] /  [email or web address] c: [phone] / [address] / [place], [address] [facility]  [email or web address]

## 2019-07-15 NOTE — PROGRESS NOTES
Bedside and verbal shift report received from [place] [id number] [facility].  Heparin gtt verified at bedside

## 2019-07-15 NOTE — PROGRESS NOTES
Warfarin dosing per pharmacist    Delsa Gitelman is a 82 y.o. male.     Height: 5' 4\" (162.6 cm)    Weight: 64.1 kg (141 lb 4.8 oz)    Indication:  AVR ([facility] mechanical valve 1990)    Goal INR:  2-3    Home dose:  5mg po qpm    Risk factors or significant drug interactions:  none    Other anticoagulants:  none    Daily Monitoring  Date  INR     Warfarin dose HGB              Notes  7/15  1.8  5mg  12.6      Will monitor INR daily and make adjustments as needed      Thank you,  Maddi Arcos, PharmD  Pharmacist  [phone]

## 2019-07-15 NOTE — ANESTHESIA POSTPROCEDURE EVALUATION
Procedure(s):  BIV ICD.     total IV anesthesia    Anesthesia Post Evaluation      Multimodal analgesia: multimodal analgesia used between 6 hours prior to anesthesia start to PACU discharge  Patient location during evaluation: PACU  Patient participation: complete - patient participated  Level of consciousness: awake and alert  Pain management: adequate  Airway patency: patent  Anesthetic complications: no  Cardiovascular status: acceptable  Respiratory status: acceptable  Hydration status: acceptable  Post anesthesia nausea and vomiting:  none      Vitals Value Taken Time   /62 7/15/2019  5:34 PM   Temp     Pulse 72 7/15/2019  5:34 PM   Resp     SpO2

## 2019-07-15 NOTE — PROGRESS NOTES
[facility] CARDIOLOGY PROGRESS NOTE           7/15/2019 1:29 PM    Admit Date: 7/13/2019    Admit Diagnosis: Bradycardia [R00.1]    Assessment:   Principal Problem:    Symptomatic bradycardia (7/13/2019)    Active Problems:    Essential hypertension (1/9/2014)      Coronary atherosclerosis of native coronary vessel (7/26/2012)      HLD (hyperlipidemia) (7/26/2012)      High degree AV block, complete heart block       S/P AVR (11/1/2016)      Bradycardia (7/13/2019)        LBBB       Deafness     Plan:   1. Severe NICM, LBBB, complete heart block - pt with class 1 indication for BiV pacemaker. With reduced EF, he also has an indication for an ICD but he does not want a defibrillator. Given his LV dyssynchrony, we will plan for CRT-P for resynchronization. 2. NICM - GDMT as tolerated, can start beta blocker post device. 3. Deafness - use of  services for consenting was performed. PM/CRT-P  I discussed in detail the potential benefits of PM/CRT therapy with AV adam ablation, including definitive rate control with possible improved mortality, prevention of SCD,  decreased hospitalization, beneficial cardiac remodeling, and prevention of disease progression. Additionally, I discussed with the patient the potential risks of PM/CRT implantation, including the risk of bleeding, infection, venous occlusion, DVT/PE, pneumothorax, cardiac tamponade, perforation, need for urgent open heart surgery, device/lead failure or lead dislodgement with pacemaker dependence, inability to place an LV lead via the coronary sinus, heart attack, stroke, arrhythmia, radiation skin injury, kidney damage/failure, oversedation, respiratory arrest, and even death. The patient understands these risks in the context of the potential benefits of the device implantation and subsequent ablation, and agrees to proceed.       Thank you for allowing me to participate in the electrophysiologic care of this most pleasant patient. Please feel free to contact me if there are any questions or concerns. Liam Mckeon. Raven Esquivel MD, MS  Clinical Cardiac Electrophysiology  [facility]    Subjective:   No complaints this AM, no chest pain or shortness of breath    Echo: 7/8/2019, EF 20-25%, mildly dilated LA, severely dilated RA, moderate LV hypokinesis    Interval History: (History of pertinent interval events obtained from nursing staff)    ROS:  GEN:  No fever or chills  Cardiovascular:  As noted above  Pulmonary:  As noted above  Neuro:  No new focal motor or sensory loss      Objective:     Vitals:    07/14/19 2049 07/15/19 0054 07/15/19 0508 07/15/19 1031   BP: 126/62 135/55 126/58 113/50   Pulse: (!) 41 (!) 38 (!) 36 (!) 40   Resp: 15 15 15 18   Temp: 98.9 °F (37.2 °C) 98.7 °F (37.1 °C) 97.9 °F (36.6 °C) 96.8 °F (36 °C)   SpO2: 92% 91% 96% 92%   Weight:   141 lb 4.8 oz (64.1 kg)    Height:           Physical Exam:  General-Well Developed, Well Nourished, No Acute Distress, Alert & Oriented x 3, appropriate mood. Neck- supple, no JVD  CV- bradycardia and rhythm no MRG  Lung- clear bilaterally  Abd- soft, nontender, nondistended  Ext- no edema bilaterally.   Skin- warm and dry    Current Facility-Administered Medications   Medication Dose Route Frequency    bacitracin 50,000 Units, neomycin-polymyxin B  2 mL in sterile water irrigation 1,000 mL irrigation   Irrigation ONCE    bupivacaine-EPINEPHrine (PF) (SENSORCAINE PF) 0.5 %-1:200,000 injection 300 mg  60 mL SubCUTAneous ONCE    iopamidol (ISOVUE-370) 76 % injection 50 mL  50 mL IntraVENous RAD ONCE    simethicone (MYLICON) tablet 80 mg  80 mg Oral QID PRN    doxazosin (CARDURA) tablet 2 mg  2 mg Oral DAILY    isosorbide mononitrate ER (IMDUR) tablet 30 mg  30 mg Oral DAILY    simvastatin (ZOCOR) tablet 20 mg  20 mg Oral QHS    sodium chloride (NS) flush 5-40 mL  5-40 mL IntraVENous Q8H    sodium chloride (NS) flush 5-40 mL  5-40 mL IntraVENous PRN    nitroglycerin (NITROSTAT) tablet 0.4 mg  0.4 mg SubLINGual Q5MIN PRN    morphine injection 2 mg  2 mg IntraVENous Q4H PRN    acetaminophen (TYLENOL) tablet 650 mg  650 mg Oral Q4H PRN    naloxone (NARCAN) injection 0.4 mg  0.4 mg IntraVENous PRN    alum-mag hydroxide-simeth (MYLANTA) oral suspension 30 mL  30 mL Oral Q4H PRN    diphenhydrAMINE (BENADRYL) capsule 50 mg  50 mg Oral QHS PRN    heparin 25,000 units in dextrose 500 mL infusion  12-25 Units/kg/hr IntraVENous TITRATE    furosemide (LASIX) injection 40 mg  40 mg IntraVENous DAILY    lisinopril (PRINIVIL, ZESTRIL) tablet 5 mg  5 mg Oral DAILY    guaiFENesin (ROBITUSSIN) 100 mg/5 mL oral liquid 200 mg  200 mg Oral Q6H PRN       Data Review:   Recent Results (from the past 24 hour(s))   PTT    Collection Time: 07/14/19  4:09 PM   Result Value Ref Range    aPTT 128.1 (H) 24.7 - 39.8 SEC   PTT    Collection Time: 07/14/19 11:52 PM   Result Value Ref Range    aPTT 95.2 (H) 24.7 - 39.8 SEC   PROTHROMBIN TIME + INR    Collection Time: 07/15/19  4:13 AM   Result Value Ref Range    Prothrombin time 20.3 (H) 11.7 - 14.5 sec    INR 1.8     METABOLIC PANEL, BASIC    Collection Time: 07/15/19  4:13 AM   Result Value Ref Range    Sodium 136 136 - 145 mmol/L    Potassium 4.0 3.5 - 5.1 mmol/L    Chloride 103 98 - 107 mmol/L    CO2 26 21 - 32 mmol/L    Anion gap 7 7 - 16 mmol/L    Glucose 83 65 - 100 mg/dL    BUN 14 8 - 23 MG/DL    Creatinine 0.87 0.8 - 1.5 MG/DL    GFR est AA >60 >60 ml/min/1.73m2    GFR est non-AA >60 >60 ml/min/1.73m2    Calcium 8.0 (L) 8.3 - 10.4 MG/DL   CBC W/O DIFF    Collection Time: 07/15/19  4:13 AM   Result Value Ref Range    WBC 5.0 4.3 - 11.1 K/uL    RBC 4.20 (L) 4.23 - 5.6 M/uL    HGB 12.6 (L) 13.6 - 17.2 g/dL    HCT 38.2 (L) 41.1 - 50.3 %    MCV 91.0 79.6 - 97.8 FL    MCH 30.0 26.1 - 32.9 PG    MCHC 33.0 31.4 - 35.0 g/dL    RDW 15.7 (H) 11.9 - 14.6 %    PLATELET 85 (L) 428 - 450 K/uL    MPV 13.1 (H) 9.4 - 12.3 FL    ABSOLUTE NRBC 0.00 0.0 - 0.2 K/uL   PTT    Collection Time: 07/15/19  7:13 AM   Result Value Ref Range    aPTT 103.7 (H) 24.7 - 39.8 SEC       EKG:  (EKG has been independently visualized by me with interpretation below): High degree AV block, likely complete heart block.

## 2019-07-15 NOTE — PROCEDURES
Attending:  Malika Lynne. Vanessa Becker MD    Referring: Radha Cam MD      Pre-Procedure Diagnosis  1. Chronic systolic heart failure, ejection fraction of 20-25%  2. Nonischemic dilated cardiomyopathy. 3. Class II heart failure symptoms. 4. Life expectancy greater than 1 year. 5. Complete heart block    Procedure Performed  1. Insertion of Biotronik biventricular pacemaker  2. Insertion of left ventricular lead at time of new system Implantation. Anesthesia: MAC     Estimated Blood Loss: Less than 10 mL     Specimens: * No specimens in log *     Complications: None     Fluoroscopy Time: 9.8 minutes / 111 mGy    Contrast: 65 cc       The procedure, indications, risks, benefits, and alternatives were discussed with the patient and family members, who desired to proceed after questions were answered and informed consent was documented. Procedure: After informed consent was obtained, the patient was brought to the Electrophysiology Laboratory in a fasting state and was prepped and draped in sterile fashion. Prophylactic antibiotic was administered 10 minutes prior to skin incision (refer to anesthesia procedural documentation for details). MAC was administered by the anesthesia team with continuous oxygen saturation measurement and blood pressure measurement. A venogram of the left axillary and left subclavian was performed which demonstrated no significant obstruction throughout its course. Local anesthetic (sensorcaine) was delivered to the left pectoral region and an incision was made parallel to the deltopectoral groove. A subcutaneous pocket was created using blunt dissection and electrocautery, and adequate hemostasis was established. Access x 3 was obtained under fluoroscopic guidance using a modified Seldinger technique with placement of two short wires and one long wire down into the RA and advanced below the diaphragm. Next, placement of a peel-away sheath over the first guidewire was performed.   A permanent RV pace/sense lead was then advanced under fluoroscopic guidance into the RV apex in a stable position with satisfactory sensing and pacing characteristics. The peel away sheath was removed. Another Safe-sheath was then placed over the second guidewire. A permanent pacing lead was advanced under fluoroscopic guidance and positioned in the right atrium at the location of the right atrial appendage. Stable RA appendage position with satisfactory sensing and pacing characteristics was obtained. The sheath was peeled. The leads were sutured to the underlying pectoralis fascia using 2 non-absorbable sutures around each lead's collar. The lead slack and position was assessed under fluoroscopy while securing the lead collars to ensure proper length. After positioning the RA and RV leads, a standard Merit (TM) Preston Sevin LV delivery sheath was advanced over the long access wire and dilator and wire were removed. The braided inner core sheath was then advanced into the Preston Sevin and used to cannulate the coronary sinus using contrast when necessary. A Versacore wire and was used to allow a smooth transition into the CS body. A coronary sinus venogram was then performed using a balloon occluding catheter. A Merit renal inner sheath was used with an Whisper to cannulate a posterolateral branch. The renal was advanced into the posterolateral and a subselected venogram was performed. The left ventricular lead was then advanced into a high posterolateral brach over a Whisper wire. Adequate thresholds were obtained with an adequate margin between phrenic stim and loss of capture. The delivery sheaths were then slit and/or peeled with fluoroscopy demonstrating stable position. The lead was then sutured to the underlying pectoralis fascia using 2 non-absorbable sutures around the lead collar. Final lead testing via the pacing system analyzer (PSA) demonstrated stable sensing, impedance and pacing thresholds.  The lead pins were then cleaned with antibiotic soaked gauze, dried gently, and attached to a new defibrillator generator. Pins were directly observed to pass the tip electrode, and the ring hex wrench screws were secured, and leads tug tested. The pocket was irrigated copiously with a saline antimicrobial solution. The device and leads were gently positioned within the pocket. The wound was closed with three layers of absorbable suture using 2-0, 3-0 and 4-0 V-Loc in a running fashion. Exofin solution was placed over the wound, allowed to dry, and then a sterile Aquacel dressing was placed over the wound. Fluoroscopic images were interpreted by me, in multiple projections. The patient tolerated the procedure well and left the lab in good condition. All sharps and sponges were counted x 2 and no complications were noted. Device and Lead Information  Pulse Generator Model #  Serial # Location Implant/Explant   [id number] Biotronik [id number] Left Pectoral Implant     Lead Model Number  Serial Number Lead position Implant/Explant   RA [id number] Biotronik [id number] RA Appendage Implant   RV [id number] Biotronik [id number] RV Van Implant   LV [id number] Biotronik [id number] LV Lateral Implant     Lead Sensitivity and Threshold  Lead R or P sensitivity (mv) Threshold (V) Threshold PW (msec) Impedance (ohms) Final output Voltage (V) Final PW (msec)   RA 3.1 0.5 0.4 448 3.6 0.4   RV 10.8 0.6 0.4 507 4.8 0.4   LV 9.0 0.5 0.4 643 2.0 0.4     Bradycardia Settings  Mateo Mode LRL URL Pace AVD (ms) Sense AVD (ms) Rate Response Mode Switching Mode SW Rate   DDD-CLS 60 120 175 135 On On 160     LV Pacing Configuration: LV1tip? [id number]      IMPRESSION: Successful implantation of Biotronik biventricular pacemaker. MRI conditional.     PLAN:  -Overnight observation.  -Device clinic follow up in 1-2 weeks.   -Routine cardiac care per Dr. Ruba Alberto. Nayely Pabon.  Dorcas Curry MD, ite Santos 89  Clinical Cardiac Electrophysiology  [facility]    7/15/2019  3:42 PM

## 2019-07-15 NOTE — PROGRESS NOTES
Bedside and Verbal shift change report given to [facility], RN (oncoming nurse) by [facility] AT [facility], RN (offgoing nurse). Report included the following information SBAR, Kardex, Accordion and Recent Results.

## 2019-07-15 NOTE — PROGRESS NOTES
Problem: Falls - Risk of  Goal: *Absence of Falls  Description  Document [facility] Fall Risk and appropriate interventions in the flowsheet.   Outcome: Progressing Towards Goal  Note:   Fall Risk Interventions:  Mobility Interventions: Bed/chair exit alarm, Patient to call before getting OOB    Medication Interventions: Patient to call before getting OOB, Teach patient to arise slowly         History of Falls Interventions: Bed/chair exit alarm, Door open when patient unattended         Problem: Patient Education: Go to Patient Education Activity  Goal: Patient/Family Education  Outcome: Progressing Towards Goal     Problem: Arrhythmia Pathway (Adult)  Goal: *Absence of arrhythmia  Outcome: Progressing Towards Goal  Note:   Patient sinus isac in the 30's     Problem: Breathing Pattern - Ineffective  Goal: *Absence of hypoxia  Outcome: Progressing Towards Goal  Note:   Visit Vitals  /55 (BP 1 Location: Left arm, BP Patient Position: At rest)   Pulse (!) 38   Temp 98.7 °F (37.1 °C)   Resp 15   Ht 5' 4\" (1.626 m)   Wt 64.5 kg (142 lb 4.8 oz)   SpO2 91%   BMI 24.43 kg/m²       Goal: *Use of effective breathing techniques  Outcome: Progressing Towards Goal  Goal: *PALLIATIVE CARE:  Alleviation of Dyspnea  Outcome: Progressing Towards Goal     Problem: Patient Education: Go to Patient Education Activity  Goal: Patient/Family Education  Outcome: Progressing Towards Goal

## 2019-07-15 NOTE — PROGRESS NOTES
TRANSFER - OUT REPORT:    Verbal report given to [facility] RN(name) on Tiffanie Castillo  being transferred to [facility](unit) for routine progression of care       Report consisted of patients Situation, Background, Assessment and   Recommendations(SBAR). Information from the following report(s) Procedure Summary was reviewed with the receiving nurse. Lines:   Peripheral IV 07/13/19 Anterior;Proximal;Right Forearm (Active)   Site Assessment Clean, dry, & intact 7/15/2019  4:05 AM   Phlebitis Assessment 0 7/15/2019  4:05 AM   Infiltration Assessment 0 7/15/2019  4:05 AM   Dressing Status Clean, dry, & intact 7/15/2019  4:05 AM   Dressing Type Tape;Transparent 7/15/2019  4:05 AM   Hub Color/Line Status Infusing 7/15/2019  4:05 AM   Alcohol Cap Used No 7/14/2019  4:01 AM        Opportunity for questions and clarification was provided.       Patient transported with:   "[employer]"

## 2019-07-15 NOTE — ANESTHESIA PREPROCEDURE EVALUATION
Relevant Problems   No relevant active problems       Anesthetic History   No history of anesthetic complications            Review of Systems / Medical History  Patient summary reviewed and pertinent labs reviewed    Pulmonary  Within defined limits                 Neuro/Psych   Within defined limits           Cardiovascular    Hypertension: well controlled  Valvular problems/murmurs (s/p AVR in 1990s with mechanical valve)      Dysrhythmias (symptomatic bradycardia and intermittent Complete Heart Block)   CAD and hyperlipidemia    Exercise tolerance: <4 METS  Comments: Echo showed LVEF 20-25%, mild to moderate MR, severe TR with RVSP of 63mmHg   GI/Hepatic/Renal  Within defined limits              Endo/Other  Within defined limits           Other Findings              Physical Exam    Airway  Mallampati: II  TM Distance: 4 - 6 cm  Neck ROM: normal range of motion   Mouth opening: Normal     Cardiovascular        Systolic click       Dental  No notable dental hx       Pulmonary  Breath sounds clear to auscultation               Abdominal  GI exam deferred       Other Findings            Anesthetic Plan    ASA: 4  Anesthesia type: total IV anesthesia          Induction: Intravenous  Anesthetic plan and risks discussed with: Patient and Sibling       used for H&P and IC. INR 1.8 today.

## 2019-07-16 ENCOUNTER — APPOINTMENT (OUTPATIENT)
Dept: GENERAL RADIOLOGY | Age: 84
DRG: 242 | End: 2019-07-16
Attending: INTERNAL MEDICINE
Payer: MEDICARE

## 2019-07-16 LAB
ANION GAP SERPL CALC-SCNC: 6 MMOL/L (ref 7–16)
BACTERIA SPEC CULT: NORMAL
BASOPHILS # BLD: 0 K/UL (ref 0–0.2)
BASOPHILS NFR BLD: 0 % (ref 0–2)
BUN SERPL-MCNC: 14 MG/DL (ref 8–23)
CALCIUM SERPL-MCNC: 7.8 MG/DL (ref 8.3–10.4)
CHLORIDE SERPL-SCNC: 102 MMOL/L (ref 98–107)
CO2 SERPL-SCNC: 27 MMOL/L (ref 21–32)
CREAT SERPL-MCNC: 0.85 MG/DL (ref 0.8–1.5)
DIFFERENTIAL METHOD BLD: ABNORMAL
EOSINOPHIL # BLD: 0.1 K/UL (ref 0–0.8)
EOSINOPHIL NFR BLD: 1 % (ref 0.5–7.8)
ERYTHROCYTE [DISTWIDTH] IN BLOOD BY AUTOMATED COUNT: 15.6 % (ref 11.9–14.6)
GLUCOSE SERPL-MCNC: 78 MG/DL (ref 65–100)
HCT VFR BLD AUTO: 36.1 % (ref 41.1–50.3)
HGB BLD-MCNC: 12.2 G/DL (ref 13.6–17.2)
IMM GRANULOCYTES # BLD AUTO: 0 K/UL (ref 0–0.5)
IMM GRANULOCYTES NFR BLD AUTO: 0 % (ref 0–5)
INR PPP: 1.4
LYMPHOCYTES # BLD: 0.7 K/UL (ref 0.5–4.6)
LYMPHOCYTES NFR BLD: 16 % (ref 13–44)
MCH RBC QN AUTO: 30.4 PG (ref 26.1–32.9)
MCHC RBC AUTO-ENTMCNC: 33.8 G/DL (ref 31.4–35)
MCV RBC AUTO: 90 FL (ref 79.6–97.8)
MONOCYTES # BLD: 0.4 K/UL (ref 0.1–1.3)
MONOCYTES NFR BLD: 10 % (ref 4–12)
NEUTS SEG # BLD: 3 K/UL (ref 1.7–8.2)
NEUTS SEG NFR BLD: 73 % (ref 43–78)
NRBC # BLD: 0 K/UL (ref 0–0.2)
PLATELET # BLD AUTO: 101 K/UL (ref 150–450)
PMV BLD AUTO: 12.2 FL (ref 9.4–12.3)
POTASSIUM SERPL-SCNC: 4.1 MMOL/L (ref 3.5–5.1)
PROTHROMBIN TIME: 17.4 SEC (ref 11.7–14.5)
RBC # BLD AUTO: 4.01 M/UL (ref 4.23–5.6)
SERVICE CMNT-IMP: NORMAL
SODIUM SERPL-SCNC: 135 MMOL/L (ref 136–145)
WBC # BLD AUTO: 4.1 K/UL (ref 4.3–11.1)

## 2019-07-16 PROCEDURE — 74011250636 HC RX REV CODE- 250/636: Performed by: INTERNAL MEDICINE

## 2019-07-16 PROCEDURE — 74011250637 HC RX REV CODE- 250/637: Performed by: INTERNAL MEDICINE

## 2019-07-16 PROCEDURE — 74011250637 HC RX REV CODE- 250/637: Performed by: NURSE PRACTITIONER

## 2019-07-16 PROCEDURE — 97535 SELF CARE MNGMENT TRAINING: CPT

## 2019-07-16 PROCEDURE — 80048 BASIC METABOLIC PNL TOTAL CA: CPT

## 2019-07-16 PROCEDURE — 74011250636 HC RX REV CODE- 250/636: Performed by: NURSE PRACTITIONER

## 2019-07-16 PROCEDURE — 74011000302 HC RX REV CODE- 302: Performed by: INTERNAL MEDICINE

## 2019-07-16 PROCEDURE — 85610 PROTHROMBIN TIME: CPT

## 2019-07-16 PROCEDURE — 85025 COMPLETE CBC W/AUTO DIFF WBC: CPT

## 2019-07-16 PROCEDURE — 97530 THERAPEUTIC ACTIVITIES: CPT

## 2019-07-16 PROCEDURE — 65660000000 HC RM CCU STEPDOWN

## 2019-07-16 PROCEDURE — 97166 OT EVAL MOD COMPLEX 45 MIN: CPT

## 2019-07-16 PROCEDURE — 36415 COLL VENOUS BLD VENIPUNCTURE: CPT

## 2019-07-16 PROCEDURE — 71046 X-RAY EXAM CHEST 2 VIEWS: CPT

## 2019-07-16 PROCEDURE — 86580 TB INTRADERMAL TEST: CPT | Performed by: INTERNAL MEDICINE

## 2019-07-16 RX ORDER — ENOXAPARIN SODIUM 100 MG/ML
60 INJECTION SUBCUTANEOUS EVERY 12 HOURS
Status: DISCONTINUED | OUTPATIENT
Start: 2019-07-16 | End: 2019-07-16

## 2019-07-16 RX ADMIN — ISOSORBIDE MONONITRATE 30 MG: 30 TABLET, EXTENDED RELEASE ORAL at 10:28

## 2019-07-16 RX ADMIN — HYDROCODONE BITARTRATE AND ACETAMINOPHEN 1 TABLET: 5; 325 TABLET ORAL at 06:02

## 2019-07-16 RX ADMIN — LISINOPRIL 5 MG: 5 TABLET ORAL at 10:28

## 2019-07-16 RX ADMIN — CARVEDILOL 6.25 MG: 6.25 TABLET, FILM COATED ORAL at 17:48

## 2019-07-16 RX ADMIN — Medication 10 ML: at 06:03

## 2019-07-16 RX ADMIN — DOXAZOSIN 2 MG: 1 TABLET ORAL at 10:28

## 2019-07-16 RX ADMIN — SIMVASTATIN 20 MG: 20 TABLET, FILM COATED ORAL at 21:13

## 2019-07-16 RX ADMIN — Medication 10 ML: at 13:34

## 2019-07-16 RX ADMIN — ACETAMINOPHEN 650 MG: 325 TABLET, FILM COATED ORAL at 10:38

## 2019-07-16 RX ADMIN — Medication 10 ML: at 21:14

## 2019-07-16 RX ADMIN — Medication 20 ML: at 13:34

## 2019-07-16 RX ADMIN — WARFARIN SODIUM 2.5 MG: 2 TABLET ORAL at 10:28

## 2019-07-16 RX ADMIN — FUROSEMIDE 40 MG: 10 INJECTION, SOLUTION INTRAMUSCULAR; INTRAVENOUS at 10:28

## 2019-07-16 RX ADMIN — CARVEDILOL 6.25 MG: 6.25 TABLET, FILM COATED ORAL at 10:28

## 2019-07-16 RX ADMIN — Medication 10 ML: at 21:13

## 2019-07-16 RX ADMIN — TUBERCULIN PURIFIED PROTEIN DERIVATIVE 5 UNITS: 5 INJECTION, SOLUTION INTRADERMAL at 13:33

## 2019-07-16 RX ADMIN — ENOXAPARIN SODIUM 60 MG: 60 INJECTION SUBCUTANEOUS at 13:32

## 2019-07-16 RX ADMIN — WARFARIN SODIUM 5 MG: 5 TABLET ORAL at 17:48

## 2019-07-16 NOTE — PROGRESS NOTES
Warfarin dosing per pharmacist    Andie Mcneill is a 82 y.o. male. Height: 5' 4\" (162.6 cm)    Weight: 62.6 kg (138 lb)    Indication:  AVR ([facility] mechanical valve 1990)    Goal INR:  2-3    Home dose:  5mg po qpm    Risk factors or significant drug interactions:  none    Other anticoagulants: To start either Lovenox or heparin bridge per Nishi Encarnacion NP    Daily Monitoring  Date  INR     Warfarin dose HGB              Notes  7/15  1.8  5mg  12.6    7/16  1.4      2.5mg + 5mg 12.2    Patient admitted for pacemaker and warfarin was held and patient was bridged with heparin prior to procedure. Heparin stopped yesterday and warfarin restarted last night post procedure. INR subtherapeutic. Discussed bridge with Nishi Encarnacion NP. They will try to bridge with Lovenox so they can send patient home. Will give an additional dose of warfarin 2.5mg now and then continue 5mg evening dose (total of 7.5mg warfarin today.)    Will continue to follow daily.         Thank you,  Joanna Zheng, PharmD

## 2019-07-16 NOTE — PROGRESS NOTES
Followed up with patient but waiting on wife and friend to discuss d/c options. Nursing providing education for Lovenox injection. He may need rehab although he prefers home. PPD/PT/OT ordered for possible rehab need. Would need authorization from [facility] for SNF. CM following.

## 2019-07-16 NOTE — PROGRESS NOTES
[facility] CARDIOLOGY PROGRESS NOTE           7/16/2019 6:38 AM    Admit Date: 7/13/2019    Admit Diagnosis: Bradycardia [R00.1]      Subjective:   No complaints this AM, no chest pain or shortness of breath, appropriate post op device pocket pain    Interval History: (History of pertinent interval events obtained from nursing staff)  Cardiac device placed yesterday, no events overnight, no immediate complications    ROS:  GEN:  No fever or chills  Cardiovascular:  As noted above  Pulmonary:  As noted above  Neuro:  No new focal motor or sensory loss      Objective:     Vitals:    07/15/19 2131 07/16/19 0030 07/16/19 0511 07/16/19 0512   BP: 150/67 91/49 109/69    Pulse: 65 68 73    Resp: 16 18 18    Temp: 99.5 °F (37.5 °C) 99 °F (37.2 °C) 98.3 °F (36.8 °C)    SpO2: 91% 94% 96%    Weight:    62.6 kg (138 lb)   Height:           Physical Exam:  General-Well Developed, Well Nourished, No Acute Distress, Alert & Oriented x 3, appropriate mood. CV- regular rate and rhythm no MRG, left infraclavicular pocket with dressing in place, no evidence of hematoma, redness, warmth or excessive drainage  Lung- clear bilaterally  Abd- soft, nontender, nondistended  Ext- no edema bilaterally.     Current Facility-Administered Medications   Medication Dose Route Frequency    sodium chloride (NS) flush 5-40 mL  5-40 mL IntraVENous Q8H    sodium chloride (NS) flush 5-40 mL  5-40 mL IntraVENous PRN    acetaminophen (TYLENOL) tablet 650 mg  650 mg Oral Q4H PRN    HYDROcodone-acetaminophen (NORCO) 5-325 mg per tablet 1 Tab  1 Tab Oral Q4H PRN    ondansetron (ZOFRAN) injection 4 mg  4 mg IntraVENous Q4H PRN    docusate sodium (COLACE) capsule 100 mg  100 mg Oral BID PRN    warfarin (COUMADIN) tablet 5 mg  5 mg Oral QPM    carvedilol (COREG) tablet 6.25 mg  6.25 mg Oral BID WITH MEALS    simethicone (MYLICON) tablet 80 mg  80 mg Oral QID PRN    doxazosin (CARDURA) tablet 2 mg  2 mg Oral DAILY    isosorbide mononitrate ER (IMDUR) tablet 30 mg  30 mg Oral DAILY    simvastatin (ZOCOR) tablet 20 mg  20 mg Oral QHS    sodium chloride (NS) flush 5-40 mL  5-40 mL IntraVENous Q8H    sodium chloride (NS) flush 5-40 mL  5-40 mL IntraVENous PRN    nitroglycerin (NITROSTAT) tablet 0.4 mg  0.4 mg SubLINGual Q5MIN PRN    morphine injection 2 mg  2 mg IntraVENous Q4H PRN    naloxone (NARCAN) injection 0.4 mg  0.4 mg IntraVENous PRN    alum-mag hydroxide-simeth (MYLANTA) oral suspension 30 mL  30 mL Oral Q4H PRN    diphenhydrAMINE (BENADRYL) capsule 50 mg  50 mg Oral QHS PRN    furosemide (LASIX) injection 40 mg  40 mg IntraVENous DAILY    lisinopril (PRINIVIL, ZESTRIL) tablet 5 mg  5 mg Oral DAILY    guaiFENesin (ROBITUSSIN) 100 mg/5 mL oral liquid 200 mg  200 mg Oral Q6H PRN     Data Review:   Recent Results (from the past 24 hour(s))   PTT    Collection Time: 07/15/19  7:13 AM   Result Value Ref Range    aPTT 103.7 (H) 24.7 - 39.8 SEC   PTT    Collection Time: 07/15/19  5:28 PM   Result Value Ref Range    aPTT 40.5 (H) 24.7 - 39.8 SEC   EKG, 12 LEAD, INITIAL    Collection Time: 07/15/19  7:15 PM   Result Value Ref Range    Ventricular Rate 62 BPM    Atrial Rate 41 BPM    P-R Interval 182 ms    QRS Duration 162 ms    Q-T Interval 508 ms    QTC Calculation (Bezet) 515 ms    Calculated R Axis -116 degrees    Calculated T Axis 48 degrees    Diagnosis       AV dual-paced rhythm  Biventricular pacemaker detected  Abnormal ECG  When compared with ECG of 13-JUL-2019 01:44,  Significant changes have occurred  Confirmed by ST RENEE LENZ MD (), JOHNSON RODRIGUEZ ([id number]) on 7/15/2019 10:23:31 PM     CBC WITH AUTOMATED DIFF    Collection Time: 07/16/19  3:55 AM   Result Value Ref Range    WBC 4.1 (L) 4.3 - 11.1 K/uL    RBC 4.01 (L) 4.23 - 5.6 M/uL    HGB 12.2 (L) 13.6 - 17.2 g/dL    HCT 36.1 (L) 41.1 - 50.3 %    MCV 90.0 79.6 - 97.8 FL    MCH 30.4 26.1 - 32.9 PG    MCHC 33.8 31.4 - 35.0 g/dL    RDW 15.6 (H) 11.9 - 14.6 %    PLATELET 336 (L) 150 - 450 K/uL    MPV 12.2 9.4 - 12.3 FL    ABSOLUTE NRBC 0.00 0.0 - 0.2 K/uL    DF AUTOMATED      NEUTROPHILS 73 43 - 78 %    LYMPHOCYTES 16 13 - 44 %    MONOCYTES 10 4.0 - 12.0 %    EOSINOPHILS 1 0.5 - 7.8 %    BASOPHILS 0 0.0 - 2.0 %    IMMATURE GRANULOCYTES 0 0.0 - 5.0 %    ABS. NEUTROPHILS 3.0 1.7 - 8.2 K/UL    ABS. LYMPHOCYTES 0.7 0.5 - 4.6 K/UL    ABS. MONOCYTES 0.4 0.1 - 1.3 K/UL    ABS. EOSINOPHILS 0.1 0.0 - 0.8 K/UL    ABS. BASOPHILS 0.0 0.0 - 0.2 K/UL    ABS. IMM. GRANS. 0.0 0.0 - 0.5 K/UL   METABOLIC PANEL, BASIC    Collection Time: 07/16/19  3:55 AM   Result Value Ref Range    Sodium 135 (L) 136 - 145 mmol/L    Potassium 4.1 3.5 - 5.1 mmol/L    Chloride 102 98 - 107 mmol/L    CO2 27 21 - 32 mmol/L    Anion gap 6 (L) 7 - 16 mmol/L    Glucose 78 65 - 100 mg/dL    BUN 14 8 - 23 MG/DL    Creatinine 0.85 0.8 - 1.5 MG/DL    GFR est AA >60 >60 ml/min/1.73m2    GFR est non-AA >60 >60 ml/min/1.73m2    Calcium 7.8 (L) 8.3 - 10.4 MG/DL   PROTHROMBIN TIME + INR    Collection Time: 07/16/19  3:55 AM   Result Value Ref Range    Prothrombin time 17.4 (H) 11.7 - 14.5 sec    INR 1.4         EKG:  (EKG has been independently visualized by me with interpretation below)  Assessment:     Principal Problem:    Symptomatic bradycardia (7/13/2019)    Active Problems:    Essential hypertension (1/9/2014)      Coronary atherosclerosis of native coronary vessel (7/26/2012)      HLD (hyperlipidemia) (7/26/2012)      Second-degree heart block; MOBITZ I; 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 Cardiac device implantation: Pt device interrogation this AM reveals normal function, excellent pacing and sensing parameters, CXR without pneumothorax with excellent device position, no recognized complications  2. CHF: cont OMT, euvolemic today  3. Mechanical AV: on warfarin, INR subtherapeutic  4. Dispo: will need dispo plan    Star Lowery MD  Cardiology/Electrophysiology

## 2019-07-16 NOTE — PROGRESS NOTES
Met with patient, wife, and sister in law for d/c planning. Patient able to read written information and sister in law assist with communication. Have contacted  to make sure patient and wife understand information due to being deaf although patient able to response to questions and responds appropriately to written information. His wife has been at [facility] in past and they are interested in that facility or [facility]. Will send information to rehab once PT see's patient. He does not have [employer] has Medicare so once bed offer and 48 hr PPD read on Thursday could have possible rehab option.  CM following

## 2019-07-16 NOTE — PROGRESS NOTES
Problem: Self Care Deficits Care Plan (Adult)  Goal: *Acute Goals and Plan of Care (Insert Text)  Description  1. Pt will toilet with CGA   2. Pt will complete functional mobility for ADLs with CGA  3. Pt will complete lower body dressing with CGA using AE as needed  4. Pt will complete upper body bathing and dressing with SBA while maintaining PM precautions  5. Pt will independently demonstrate/ maintain 100% PM precautions during functional tasks  6. Pt will tolerate 23 minutes functional activity with min or fewer rest breaks to promote increased endurance for ADLs  7. Pt will complete bed mobility with CGA in prep for ADLs    Timeframe: 7 days     Outcome: Progressing Towards Goal     OCCUPATIONAL THERAPY: Initial Assessment and Daily Note 7/16/2019  INPATIENT: OT Visit Days: 1  [place] / [place] B / Product Type: Medicare /      NAME/AGE/GENDER: Lewis Mejias is a 82 y.o. male   PRIMARY DIAGNOSIS:  Bradycardia [R00.1] Symptomatic bradycardia   Symptomatic bradycardia    Procedure(s) (LRB):  BIV ICD (N/A)  1 Day Post-Op  ICD-10: Treatment Diagnosis:    Generalized Muscle Weakness (M62.81)   Precautions/Allergies:    Pacemaker, falls Procaine      ASSESSMENT:     Mr. Mathieu Disla was admitted with symptomatic bradycardia, now s/p PM placement yesterday. Pt is deaf, communicated in writing to ensure accuracy and carryover of information and education although pt appears to be fair at reading lips. Pt lives with his wife and is independent at baseline, does not use any DME. Pt's wife is not in good health and is not able to assist. This session, pt presented generally weak and deconditioned with deficits in mobility, balance, endurance, and limitations of PM precautions impacting ADLs. Pt educated on PM precautions and on adaptive techniques to maintain during ADLs and mobility for ADLs.  Pt verbalized understanding, however required min- mod multi modal cues for carryover during session. Pt required mod A for bed mobility, min A to scoot to the edge, mod A for socks. Min-mod A to don/ doff sling. Mod A to stand, min A for transfer to chair using RW. Pt fatigued quickly and c/o L groin pain. Pt is well below his functional baseline and would benefit from skilled OT services to address deficits to promote improved ADL safety and independence while maintaining precautions. Recommend d/c to SNF. This section established at most recent assessment   PROBLEM LIST (Impairments causing functional limitations):  Decreased Strength  Decreased ADL/Functional Activities  Decreased Transfer Abilities  Decreased Balance  Increased Pain  Decreased Activity Tolerance  Increased Fatigue  Decreased Knowledge of Precautions   INTERVENTIONS PLANNED: (Benefits and precautions of occupational therapy have been discussed with the patient.)  Activities of daily living training  Adaptive equipment training  Balance training  Therapeutic activity  Therapeutic exercise     TREATMENT PLAN: Frequency/Duration: Follow patient 3 times/ week to address above goals. Rehabilitation Potential For Stated Goals: Good     REHAB RECOMMENDATIONS (at time of discharge pending progress):    Placement: It is my opinion, based on this patient's performance to date, that Mr. Chloe Quinteros may benefit from intensive therapy at a [address] [place] [address] after discharge due to the functional deficits listed above that are likely to improve with skilled rehabilitation and concerns that he/she may be unsafe to be unsupervised at home due to impaired ADLs and mobility, fall risk .   Equipment:   BSC, RW               OCCUPATIONAL PROFILE AND HISTORY:   History of Present Injury/Illness (Reason for Referral):  See H&P  Past Medical History/Comorbidities:   Mr. Chloe Quinteros  has a past medical history of Abnormal EKG (1/9/2014), Coronary atherosclerosis of native coronary vessel (7/26/2012), Essential hypertension (1/9/2014), H/O prosthetic aortic valve replacement, HLD (hyperlipidemia) (7/26/2012), Malignant neoplasm of prostate ([facility] [address] (7/26/2012), PND (paroxysmal nocturnal dyspnea) (5/1/2017), S/P AVR (11/1/2016), Second-degree heart block; MOBITZ I; [place]  (6/29/2016), Slow pulse, and Valvular heart disease. Mr. Tyrone Meier  has a past surgical history that includes hx coronary artery bypass graft; hx aortic valve replacement (4/12/1990); and hx heart valve surgery. Social History/Living Environment:   Home Environment: Trailer/mobile home  One/Two Story Residence: One story  Living Alone: No  Support Systems: Spouse/Significant Other/Partner  Patient Expects to be Discharged to[de-identified] Trailer/mobile home  Current DME Used/Available at Home: None  Tub or Shower Type: Tub(Wife has a walk in shower that pt can access)  Prior Level of Function/Work/Activity:  Independent, lives w/ wife     Number of Personal Factors/Comorbidities that affect the Plan of Care: Expanded review of therapy/medical records (1-2):  MODERATE COMPLEXITY   ASSESSMENT OF OCCUPATIONAL PERFORMANCE[de-identified]   Activities of Daily Living:   Basic ADLs (From Assessment) Complex ADLs (From Assessment)   Feeding: Setup  Oral Facial Hygiene/Grooming: Setup  Bathing: Minimum assistance, Moderate assistance  Upper Body Dressing: Moderate assistance  Lower Body Dressing: Moderate assistance  Toileting: Minimum assistance, Moderate assistance Instrumental ADL  Meal Preparation: Maximum assistance  Homemaking: Maximum assistance   Grooming/Bathing/Dressing Activities of Daily Living   Grooming  Grooming Assistance: Contact guard assistance                         Bed/Mat Mobility  Supine to Sit: Moderate assistance  Sit to Stand: Minimum assistance; Moderate assistance  Stand to Sit: Minimum assistance  Bed to Chair: Minimum assistance  Scooting: Minimum assistance     Most Recent Physical Functioning:   Gross Assessment:  AROM: Generally decreased, functional  Strength: Generally decreased, functional  Coordination: Within functional limits               Posture:     Balance:  Sitting: Impaired  Sitting - Static: Good (unsupported)  Sitting - Dynamic: Fair (occasional)  Standing: Impaired; With support  Standing - Static: Fair  Standing - Dynamic : Fair Bed Mobility:  Supine to Sit: Moderate assistance  Scooting: Minimum assistance  Wheelchair Mobility:     Transfers:  Sit to Stand: Minimum assistance; Moderate assistance  Stand to Sit: Minimum assistance  Bed to Chair: Minimum assistance            Patient Vitals for the past 6 hrs:   BP SpO2 Pulse   19 1233 118/58 94 % 80       Mental Status  Neurologic State: Alert  Orientation Level: Oriented X4  Cognition: Follows commands, Appropriate decision making                          Physical Skills Involved:  Balance  Strength  Activity Tolerance  Pain (acute) Cognitive Skills Affected (resulting in the inability to perform in a timely and safe manner):  none  Psychosocial Skills Affected:  Habits/Routines  Environmental Adaptation   Number of elements that affect the Plan of Care: 3-5:  MODERATE COMPLEXITY   CLINICAL DECISION MAKIN [address]? ?6 Clicks? Daily Activity Inpatient Short Form  How much help from another person does the patient currently need. .. Total A Lot A Little None   1. Putting on and taking off regular lower body clothing? ? 1   ? 2   ? 3   ? 4   2. Bathing (including washing, rinsing, drying)? ? 1   ? 2   ? 3   ? 4   3. Toileting, which includes using toilet, bedpan or urinal?   ? 1   ? 2   ? 3   ? 4   4. Putting on and taking off regular upper body clothing? ? 1   ? 2   ? 3   ? 4   5. Taking care of personal grooming such as brushing teeth? ? 1   ? 2   ? 3   ? 4   6. Eating meals? ? 1   ? 2   ? 3   ? 4   © , Trustees of [address] [place] [address] [address] [id number], under license to [employer].  All rights reserved      Score:  Initial: 15 Most Recent: X (Date: -- )    Interpretation of Tool:  Represents activities that are increasingly more difficult (i.e. Bed mobility, Transfers, Gait). Medical Necessity:     Patient demonstrates good   rehab potential due to higher previous functional level. Reason for Services/Other Comments:  Patient continues to require present interventions due to patient's inability to complete ADLs   . Use of outcome tool(s) and clinical judgement create a POC that gives a: MODERATE COMPLEXITY         TREATMENT:   (In addition to Assessment/Re-Assessment sessions the following treatments were rendered)     Pre-treatment Symptoms/Complaints:    Pain: Initial:   Pain Intensity 1: 5  Pain Location 1: Groin, Leg  Pain Orientation 1: Left  Pain Intervention(s) 1: Nurse notified, Repositioned  Post Session:  5     Self Care: (10 min): Procedure(s) (per grid) utilized to improve and/or restore self-care/home management as related to dressing and grooming. Required minimal visual and verbal cueing to facilitate activities of daily living skills and compensatory activities. Therapeutic Activity: (    15 min): Therapeutic activities including Bed transfers, Chair transfers and education of PM precautions  to improve mobility, strength and safety, and independence with ADLs . Braces/Orthotics/Lines/Etc:   O2 Device: Nasal cannula  Treatment/Session Assessment:    Response to Treatment:  fatigue, pain  Interdisciplinary Collaboration:   Occupational Therapist  Registered Nurse  After treatment position/precautions:   Up in chair  Bed alarm/tab alert on  Bed/Chair-wheels locked  Call light within reach  RN notified   Compliance with Program/Exercises: Compliant most of the time. Recommendations/Intent for next treatment session: \"Next visit will focus on advancements to more challenging activities and reduction in assistance provided\".   Total Treatment Duration:  OT Patient Time In/Time Out  Time In: 1410  Time Out: [address]

## 2019-07-16 NOTE — PROGRESS NOTES
To request a  after 4:30 p.m., please call Christiana at [phone]. Thank you,      Curt Vargas, [address] [facility] 151 /  [email or web address] c: [phone] / [address] [facility] 63 / [place], [address] [facility]  [email or web address]

## 2019-07-16 NOTE — PROGRESS NOTES
Request to check on the cost of Lovenox 60 mg BID x 8 doses and patient states he obtains medications at [address]  [phone] Fax [phone]. Spoke with Karlo Veras at the [facility] and he stated medications is in stock and cost will be $43.18 for the 8 doses. Nursing to provide education for injections and see if patient or family able to give injections. Patient currently off floor. CM following.

## 2019-07-16 NOTE — PROGRESS NOTES
Pt pacer site swollen to golf ball size. Marked and made MD aware. Sandbag placed.  Will continue to monitor

## 2019-07-16 NOTE — PROGRESS NOTES
Bedside and Verbal shift change report given to Mary Grace Alcaraz RN (oncoming nurse) by Anuradha Sheehan RN (offgoing nurse). Report included the following information SBAR, Kardex, Accordion and Recent Results.

## 2019-07-16 NOTE — PROGRESS NOTES
Bedside and Verbal shift change report given to self (oncoming nurse) by Lexus Sol RN (offgoing nurse). Report included the following information SBAR, Kardex, ED Summary, Intake/Output, MAR and Recent Results.

## 2019-07-17 LAB
ANION GAP SERPL CALC-SCNC: 8 MMOL/L (ref 7–16)
BUN SERPL-MCNC: 15 MG/DL (ref 8–23)
CALCIUM SERPL-MCNC: 7.6 MG/DL (ref 8.3–10.4)
CHLORIDE SERPL-SCNC: 99 MMOL/L (ref 98–107)
CO2 SERPL-SCNC: 26 MMOL/L (ref 21–32)
CREAT SERPL-MCNC: 0.83 MG/DL (ref 0.8–1.5)
GLUCOSE SERPL-MCNC: 92 MG/DL (ref 65–100)
INR PPP: 1.9
MM INDURATION POC: NORMAL (ref 0–5)
POTASSIUM SERPL-SCNC: 3.9 MMOL/L (ref 3.5–5.1)
PPD POC: NORMAL
PROTHROMBIN TIME: 21.3 SEC (ref 11.7–14.5)
SODIUM SERPL-SCNC: 133 MMOL/L (ref 136–145)

## 2019-07-17 PROCEDURE — 74011250637 HC RX REV CODE- 250/637: Performed by: INTERNAL MEDICINE

## 2019-07-17 PROCEDURE — 65660000000 HC RM CCU STEPDOWN

## 2019-07-17 PROCEDURE — 80048 BASIC METABOLIC PNL TOTAL CA: CPT

## 2019-07-17 PROCEDURE — 74011250637 HC RX REV CODE- 250/637: Performed by: NURSE PRACTITIONER

## 2019-07-17 PROCEDURE — 77030019605

## 2019-07-17 PROCEDURE — 97161 PT EVAL LOW COMPLEX 20 MIN: CPT

## 2019-07-17 PROCEDURE — 85610 PROTHROMBIN TIME: CPT

## 2019-07-17 PROCEDURE — 36415 COLL VENOUS BLD VENIPUNCTURE: CPT

## 2019-07-17 PROCEDURE — 74011250636 HC RX REV CODE- 250/636: Performed by: INTERNAL MEDICINE

## 2019-07-17 RX ORDER — DOXAZOSIN 1 MG/1
4 TABLET ORAL DAILY
Status: DISCONTINUED | OUTPATIENT
Start: 2019-07-18 | End: 2019-07-19 | Stop reason: HOSPADM

## 2019-07-17 RX ORDER — FINASTERIDE 5 MG/1
5 TABLET, FILM COATED ORAL DAILY
Status: DISCONTINUED | OUTPATIENT
Start: 2019-07-18 | End: 2019-07-19 | Stop reason: HOSPADM

## 2019-07-17 RX ADMIN — ACETAMINOPHEN 650 MG: 325 TABLET, FILM COATED ORAL at 04:29

## 2019-07-17 RX ADMIN — ISOSORBIDE MONONITRATE 30 MG: 30 TABLET, EXTENDED RELEASE ORAL at 09:57

## 2019-07-17 RX ADMIN — CARVEDILOL 6.25 MG: 6.25 TABLET, FILM COATED ORAL at 17:34

## 2019-07-17 RX ADMIN — WARFARIN SODIUM 5 MG: 5 TABLET ORAL at 17:34

## 2019-07-17 RX ADMIN — GUAIFENESIN 200 MG: 100 SOLUTION ORAL at 23:35

## 2019-07-17 RX ADMIN — CARVEDILOL 6.25 MG: 6.25 TABLET, FILM COATED ORAL at 09:57

## 2019-07-17 RX ADMIN — DOXAZOSIN 2 MG: 1 TABLET ORAL at 09:59

## 2019-07-17 RX ADMIN — Medication 10 ML: at 05:27

## 2019-07-17 RX ADMIN — FUROSEMIDE 40 MG: 10 INJECTION, SOLUTION INTRAMUSCULAR; INTRAVENOUS at 09:57

## 2019-07-17 RX ADMIN — Medication 10 ML: at 21:22

## 2019-07-17 RX ADMIN — LISINOPRIL 5 MG: 5 TABLET ORAL at 09:57

## 2019-07-17 RX ADMIN — SIMVASTATIN 20 MG: 20 TABLET, FILM COATED ORAL at 21:22

## 2019-07-17 NOTE — PROGRESS NOTES
To request a  after 4:30 p.m., please call Christiana at [phone]. Thank you,      Angel Arnett, [address] [facility] 151 /  [email or web address] c: [phone] / [address] / [place], [address] [facility]  [email or web address]

## 2019-07-17 NOTE — PROGRESS NOTES
[facility] CARDIOLOGY PROGRESS NOTE           7/17/2019 6:46 AM    Admit Date: 7/13/2019    Admit Diagnosis: Bradycardia [R00.1]      Subjective:   No complaints this AM, no chest pain or shortness of breath    Interval History: (History of pertinent interval events obtained from nursing staff)    ROS:  GEN:  No fever or chills  Cardiovascular:  As noted above  Pulmonary:  As noted above  Neuro:  No new focal motor or sensory loss      Objective:     Vitals:    07/16/19 2049 07/17/19 0019 07/17/19 0400 07/17/19 0457   BP: 95/51 105/48  114/60   Pulse: 72 66 76 79   Resp: 18 18 17   Temp: 98 °F (36.7 °C) 99.9 °F (37.7 °C)  99 °F (37.2 °C)   SpO2: 90% 91%  94%   Weight:    61.9 kg (136 lb 8 oz)   Height:           Physical Exam:  General-Well Developed, Well Nourished, No Acute Distress, Alert & Oriented x 3, appropriate mood. CV- regular rate and rhythm no MRG, left infraclavicular pocket with dressing in place, no evidence of hematoma, redness, warmth or excessive drainage  Lung- clear bilaterally  Abd- soft, nontender, nondistended  Ext- no edema bilaterally.     Current Facility-Administered Medications   Medication Dose Route Frequency    tuberculin injection 5 Units  5 Units IntraDERMal ONCE    sodium chloride (NS) flush 5-40 mL  5-40 mL IntraVENous Q8H    sodium chloride (NS) flush 5-40 mL  5-40 mL IntraVENous PRN    acetaminophen (TYLENOL) tablet 650 mg  650 mg Oral Q4H PRN    HYDROcodone-acetaminophen (NORCO) 5-325 mg per tablet 1 Tab  1 Tab Oral Q4H PRN    ondansetron (ZOFRAN) injection 4 mg  4 mg IntraVENous Q4H PRN    docusate sodium (COLACE) capsule 100 mg  100 mg Oral BID PRN    warfarin (COUMADIN) tablet 5 mg  5 mg Oral QPM    carvedilol (COREG) tablet 6.25 mg  6.25 mg Oral BID WITH MEALS    simethicone (MYLICON) tablet 80 mg  80 mg Oral QID PRN    doxazosin (CARDURA) tablet 2 mg  2 mg Oral DAILY    isosorbide mononitrate ER (IMDUR) tablet 30 mg  30 mg Oral DAILY    simvastatin (ZOCOR) tablet 20 mg  20 mg Oral QHS    sodium chloride (NS) flush 5-40 mL  5-40 mL IntraVENous Q8H    nitroglycerin (NITROSTAT) tablet 0.4 mg  0.4 mg SubLINGual Q5MIN PRN    morphine injection 2 mg  2 mg IntraVENous Q4H PRN    naloxone (NARCAN) injection 0.4 mg  0.4 mg IntraVENous PRN    alum-mag hydroxide-simeth (MYLANTA) oral suspension 30 mL  30 mL Oral Q4H PRN    diphenhydrAMINE (BENADRYL) capsule 50 mg  50 mg Oral QHS PRN    furosemide (LASIX) injection 40 mg  40 mg IntraVENous DAILY    lisinopril (PRINIVIL, ZESTRIL) tablet 5 mg  5 mg Oral DAILY    guaiFENesin (ROBITUSSIN) 100 mg/5 mL oral liquid 200 mg  200 mg Oral Q6H PRN     Data Review:   Recent Results (from the past 24 hour(s))   METABOLIC PANEL, BASIC    Collection Time: 07/17/19  4:15 AM   Result Value Ref Range    Sodium 133 (L) 136 - 145 mmol/L    Potassium 3.9 3.5 - 5.1 mmol/L    Chloride 99 98 - 107 mmol/L    CO2 26 21 - 32 mmol/L    Anion gap 8 7 - 16 mmol/L    Glucose 92 65 - 100 mg/dL    BUN 15 8 - 23 MG/DL    Creatinine 0.83 0.8 - 1.5 MG/DL    GFR est AA >60 >60 ml/min/1.73m2    GFR est non-AA >60 >60 ml/min/1.73m2    Calcium 7.6 (L) 8.3 - 10.4 MG/DL   PROTHROMBIN TIME + INR    Collection Time: 07/17/19  4:15 AM   Result Value Ref Range    Prothrombin time 21.3 (H) 11.7 - 14.5 sec    INR 1.9         EKG:  (EKG has been independently visualized by me with interpretation below)  Assessment:     Principal Problem:    Symptomatic bradycardia (7/13/2019)    Active Problems:    Essential hypertension (1/9/2014)      Coronary atherosclerosis of native coronary vessel (7/26/2012)      HLD (hyperlipidemia) (7/26/2012)      Second-degree heart block; MOBITZ I; 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  Cardiac device implantation: Pt device interrogation post implant reveals normal function, excellent pacing and sensing parameters, CXR without pneumothorax with excellent device position, no recognized complications  2. CHF: cont OMT, euvolemic today  3. Mechanical AV: on warfarin, INR 1.9 today  4. Dispo: D/C home today with device follow up, unless Pt requires SNF/rehab    Esvin Lowery MD  Cardiology/Electrophysiology

## 2019-07-17 NOTE — PROGRESS NOTES
Warfarin dosing per pharmacist    Kindra Cruz is a 82 y.o. male. Height: 5' 4\" (162.6 cm)    Weight: 61.9 kg (136 lb 8 oz)    Indication:  AVR ([facility] mechanical valve 1990)    Goal INR:  2-3    Home dose:  5 mg po qpm    Risk factors or significant drug interactions:  none    Other anticoagulants:  none (Lovenox stopped 7/16)    Daily Monitoring  Date  INR     Warfarin dose HGB              Notes  7/15  1.8  5mg  12.6    7/16  1.4      2.5mg + 5mg 12.2  7/17  1.9  5 mg  --    Patient admitted for pacemaker and warfarin was held and patient was bridged with heparin prior to procedure. Heparin stopped and warfarin restarted 7/15 post procedure. INR remains subtherapeutic but trending toward goal.  Discussed bridge with Celestine Trevizo NP. Bleed risk outweighs benefit of bridging with Lovenox so this was stopped yesterday. Continue home regimen of 5 mg daily. Will continue to follow.       Thank you,  Jonathan Worrell, PharmD, [facility]  Pharmacist  [phone]

## 2019-07-17 NOTE — CONSULTS
CONSULT    Subjective:      Babatunde Cheney is a 82 y.o. male with PMHx of AVR (s/p SJM mechanical ), CAD (s/p CABG), HTN, BPH and intermittent CHB/Mobitz II (previously asymptomatic and declined PPM) who presented 19 in transfer from [facility] with bradycardia and vomiting. BIV/ICD has been placed this admission. He is deaf and reads lips, but typically communicates by sign language. Urologic consult in regards to urinary retention. Mendoza was placed 19 with high volume >1 L post void residual.  Of note, he is followed by Ladonna Lechuga at [facility]) Urology. He was diagnosed with [place] 6 prostate cancer in  and is followed with watchful waiting, no treatment as of yet. He also has bph and is regularly on 4 mg doxazosin daily. CT 18 revealed a markedly enlarged prostate. Mendoza is currently in place and draining clear yellow urine. Past Medical History:   Diagnosis Date    Abnormal EKG 2014    Coronary atherosclerosis of native coronary vessel 2012    Essential hypertension 2014    H/O prosthetic aortic valve replacement     HLD (hyperlipidemia) 2012    Malignant neoplasm of prostate ([facility] [address] 2012    PND (paroxysmal nocturnal dyspnea) 2017    S/P AVR 2016    Second-degree heart block; MOBITZ I; WENCKEB 2016    Slow pulse     Valvular heart disease      Past Surgical History:   Procedure Laterality Date    HX AORTIC VALVE REPLACEMENT  1990    at [facility]      HX HEART VALVE SURGERY        No family history on file.   Social History     Tobacco Use    Smoking status: Former Smoker     Packs/day: 1.00     Years: 15.00     Pack years: 15.00     Last attempt to quit: 1988     Years since quittin.0    Smokeless tobacco: Never Used    Tobacco comment: pack every 3-4 days   Substance Use Topics    Alcohol use: No     Allergies   Allergen Reactions    Procaine Unknown (comments) Prior to Admission medications    Medication Sig Start Date End Date Taking? Authorizing Provider   isosorbide mononitrate ER (IMDUR) 30 mg tablet Take 1 Tab by mouth daily. 3/4/19  Yes Susannah Willard MD   simvastatin (ZOCOR) 20 mg tablet Take 1 Tab by mouth nightly. 3/4/19  Yes Susannah Willard MD   furosemide (LASIX) 20 mg tablet Take 20 mg by mouth two (2) times a day. Yes Provider, Historical   meclizine (ANTIVERT) 25 mg tablet Take  by mouth three (3) times daily as needed. Yes Provider, Historical   warfarin (COUMADIN) 5 mg tablet Take 5 mg by mouth daily. Take as directed   Yes Provider, Historical   doxazosin (CARDURA) 4 mg tablet Take 4 mg by mouth daily. 1/2 tab daily   Yes Provider, Historical        Review of Systems:  Pertinent items are noted in HPI. Objective:        Patient Vitals for the past 8 hrs:   BP Temp Pulse Resp SpO2   19 1701 109/57 98.6 °F (37 °C) 79 18 96 %   19 1150 97/53 98 °F (36.7 °C) 75 18 96 %       Temp (24hrs), Av.7 °F (37.1 °C), Min:98 °F (36.7 °C), Max:99.9 °F (37.7 °C)      Physical Exam:  GENERAL: alert, cooperative, no distress, appears stated age, LUNG: clear to auscultation bilaterally, HEART: regular rate and rhythm, ABDOMEN: soft, non-tender.  Bowel sounds normal. No masses,  no organomegaly; grimes in place with yellow urine    Recent Results (from the past 24 hour(s))   METABOLIC PANEL, BASIC    Collection Time: 19  4:15 AM   Result Value Ref Range    Sodium 133 (L) 136 - 145 mmol/L    Potassium 3.9 3.5 - 5.1 mmol/L    Chloride 99 98 - 107 mmol/L    CO2 26 21 - 32 mmol/L    Anion gap 8 7 - 16 mmol/L    Glucose 92 65 - 100 mg/dL    BUN 15 8 - 23 MG/DL    Creatinine 0.83 0.8 - 1.5 MG/DL    GFR est AA >60 >60 ml/min/1.73m2    GFR est non-AA >60 >60 ml/min/1.73m2    Calcium 7.6 (L) 8.3 - 10.4 MG/DL   PROTHROMBIN TIME + INR    Collection Time: 19  4:15 AM   Result Value Ref Range    Prothrombin time 21.3 (H) 11.7 - 14.5 sec    INR 1.9 PLEASE READ & DOCUMENT PPD TEST IN 24 HRS    Collection Time: 07/17/19  1:33 PM   Result Value Ref Range    PPD      mm Induration           Assessment:     Prostate cancer    BPH    Urinary retention    Plan:     I have increased his doxazosin from 2 mg to his home dosage of 4 mg's daily. I have added finasteride daily. Leave grimes in place at least 36-48 more hours due to medication change and can then remove for trial of void. IF still unable to void, continue medications, replace a coude tip grimes, and follow up with his primary urology team at [facility].

## 2019-07-17 NOTE — PROGRESS NOTES
Bedside and Verbal shift change report given to self (oncoming nurse) by Cely Macias RN (offgoing nurse). Report included the following information SBAR, Kardex and MAR. Pt resting in bed, pacer site CDI, tender to palpation around dressing and swollen, pt paced on monitor, no distress, no needs voiced.

## 2019-07-17 NOTE — PROGRESS NOTES
Problem: Mobility Impaired (Adult and Pediatric)  Goal: *Acute Goals and Plan of Care (Insert Text)  Description  Goals:  (1.)Mr. Tha Bustamante will move from supine to sit and sit to supine , scoot up and down and roll side to side with MODIFIED INDEPENDENCE within 4 treatment day(s). (2.)Mr. Tha Bustamante will transfer from bed to chair and chair to bed with SUPERVISION using the least restrictive device within 4 treatment day(s). (3.)Mr. Tha Bustamante will ambulate with STAND BY ASSIST for 100-150 feet with the least restrictive device within 4 treatment day(s). PHYSICAL THERAPY: Initial Assessment and AM 7/17/2019  INPATIENT:    [place] / Plan[place] PART [place] B / Product Type: Medicare /       NAME/AGE/GENDER: Reyes Jones is a 82 y.o. male   PRIMARY DIAGNOSIS: Bradycardia [R00.1] Symptomatic bradycardia   Symptomatic bradycardia    Procedure(s) (LRB):  BIV ICD (N/A)  2 Days Post-Op  ICD-10: Treatment Diagnosis:    Generalized Muscle Weakness (M62.81)  Difficulty in walking, Not elsewhere classified (R26.2)   Precaution/Allergies:  Procaine      ASSESSMENT:     Mr. Tha Bustamante is an 82year old WM with an admitting diagnosis of Bradycardia with subsequent BIV ICD placement. His PMH includes AVR, CAD (s/p CABG), HTN, BPH and intermittent CHB/Mobitz II. 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 He is talkative and able to express his thoughts. He presents today in supine with his LUE in a sling. Medical record reports that he lives with his wife in an trailer/mobile home and he was independent without any type of assistive device prior to this admission. His BLE AROM is WFLs. He requires minimal assist for supine to sit and he was a little guarded with his LUE in the sling. Sitting balance was good. Sit to stand was CGA using his RUE for HHA. Standing posture is slightly stooped. He ambulated with HHA on the RUE 28' with a slow shuffle style gait.   He needed visual/manual cues for more upright posture. He returned to his room and elected to sit in the bedside chair. He was positioned for comfort with the call light within reach and the chair alarm in place. He was instructed to not get up with out assistance. He was pleasant and cooperative and stated that he did much better today. This section established at most recent assessment   PROBLEM LIST (Impairments causing functional limitations):  Decreased Strength  Decreased Transfer Abilities  Decreased Ambulation Ability/Technique  Decreased Activity Tolerance   INTERVENTIONS PLANNED: (Benefits and precautions of physical therapy have been discussed with the patient.)  Bed Mobility  Gait Training  Therapeutic Activites  Therapeutic Exercise/Strengthening  Transfer Training     TREATMENT PLAN: Frequency/Duration: 3 times a week for duration of hospital stay  Rehabilitation Potential For Stated Goals: Good     REHAB RECOMMENDATIONS (at time of discharge pending progress):    Placement: It is my opinion, based on this patient's performance to date, that Mr. Efraín Last may benefit from participating in 1-2 additional therapy sessions in order to continue to assess for rehab potential and then make recommendation for disposition at discharge. Equipment:   None at this time              HISTORY:   History of Present Injury/Illness (Reason for Referral):  Patient is a 82 y.o.  male with PMHx of AVR (s/p SJM mechanical 1990), CAD (s/p CABG), HTN, BPH and intermittent CHB/Mobitz II (previously asymptomatic and declined PPM) who presents in transfer from [facility] with bradycardia and vomiting. History is limited due to sign-language computer not working. An  is in route to the hospital. Pt is able to read and communicate through writing.    Past Medical History/Comorbidities:   Mr. Efraín Last  has a past medical history of Abnormal EKG (1/9/2014), Coronary atherosclerosis of native coronary vessel (7/26/2012), Essential hypertension (1/9/2014), H/O prosthetic aortic valve replacement, HLD (hyperlipidemia) (7/26/2012), Malignant neoplasm of prostate ([facility] [address] (7/26/2012), PND (paroxysmal nocturnal dyspnea) (5/1/2017), S/P AVR (11/1/2016), Second-degree heart block; MOBITZ I; [place] (6/29/2016), Slow pulse, and Valvular heart disease. Mr. Laisha Brink  has a past surgical history that includes hx coronary artery bypass graft; hx aortic valve replacement (4/12/1990); and hx heart valve surgery. Social History/Living Environment:   Home Environment: Trailer/mobile home  One/Two Story Residence: One story  Living Alone: No  Support Systems: Spouse/Significant Other/Partner  Patient Expects to be Discharged to[de-identified] Trailer/mobile home  Current DME Used/Available at Home: None  Tub or Shower Type: Tub(Wife has a walk in shower that pt can access)  Prior Level of Function/Work/Activity:  Medical record reports the patient has been functioning independently without an assistive device prior to this admission. Number of Personal Factors/Comorbidities that affect the Plan of Care: 1-2: MODERATE COMPLEXITY   EXAMINATION:   Most Recent Physical Functioning:   Gross Assessment:  AROM: Generally decreased, functional  PROM: Generally decreased, functional  Strength: Generally decreased, functional  Coordination: Generally decreased, functional  Tone: Normal  Sensation: Intact               Posture:  Posture (WDL): Exceptions to WDL  Posture Assessment:  Forward head, Rounded shoulders, Trunk flexion  Balance:  Sitting: Intact  Sitting - Static: Good (unsupported)  Sitting - Dynamic: Good (unsupported)  Standing: Impaired  Standing - Static: Fair;Constant support  Standing - Dynamic : Fair Bed Mobility:  Supine to Sit: Minimum assistance  Scooting: Minimum assistance  Wheelchair Mobility:     Transfers:  Sit to Stand: Contact guard assistance  Stand to Sit: Contact guard assistance  Bed to Chair: Contact guard assistance  Gait:     Base of Support: Narrowed  Speed/Ladonna: Pace decreased (<100 feet/min)  Step Length: Left shortened;Right shortened  Distance (ft): 35 Feet (ft)  Assistive Device: Other (comment)(HHA on the right)  Ambulation - Level of Assistance: Contact guard assistance  Interventions: Visual/Demos;Manual cues      Body Structures Involved:  Heart Body Functions Affected:  Cardio Activities and Participation Affected:  General Tasks and Demands  Mobility   Number of elements that affect the Plan of Care: 1-2: LOW COMPLEXITY   CLINICAL PRESENTATION:   Presentation: Stable and uncomplicated: LOW COMPLEXITY   CLINICAL DECISION MAKING:   [place] Form  How much difficulty does the patient currently have. .. Unable A Lot A Little None   1. Turning over in bed (including adjusting bedclothes, sheets and blankets)? ? 1   ? 2   ? 3   ? 4   2. Sitting down on and standing up from a chair with arms ( e.g., wheelchair, bedside commode, etc.)   ? 1   ? 2   ? 3   ? 4   3. Moving from lying on back to sitting on the side of the bed?   ? 1   ? 2   ? 3   ? 4   How much help from another person does the patient currently need. .. Total A Lot A Little None   4. Moving to and from a bed to a chair (including a wheelchair)? ? 1   ? 2   ? 3   ? 4   5. Need to walk in hospital room? ? 1   ? 2   ? 3   ? 4   6. Climbing 3-5 steps with a railing? ? 1   ? 2   ? 3   ? 4   © 2007, Trustees of [facility] [employer], under license to [employer]. All rights reserved      Score:  Initial: 17 Most Recent: X (Date: -- )    Interpretation of Tool:  Represents activities that are increasingly more difficult (i.e. Bed mobility, Transfers, Gait). Medical Necessity:     Patient is expected to demonstrate progress in strength and functional technique   to increase independence with bed mobility, SPT and gait with LRAD   .   Reason for Services/Other Comments:  Patient continues to require skilled intervention due to medical complications  . Use of outcome tool(s) and clinical judgement create a POC that gives a: Clear prediction of patient's progress: LOW COMPLEXITY            TREATMENT:   (In addition to Assessment/Re-Assessment sessions the following treatments were rendered)   Pre-treatment Symptoms/Complaints:  Patient had no complaints of pain during therapy today. Pain: Initial:   Pain Intensity 1: 0  Post Session:  0/10     Assessment/Reassessment only, no treatment provided today    Braces/Orthotics/Lines/Etc:   O2 Device: Room air  Treatment/Session Assessment:    Response to Treatment:  Patient participated well and states that he did much better today compared to yesterday. Interdisciplinary Collaboration:   Physical Therapist  Certified Nursing Assistant/Patient Care Technician  After treatment position/precautions:   Up in chair  Bed/Chair-wheels locked  Call light within reach  RN notified  Chair alarm on    Compliance with Program/Exercises: Will assess as treatment progresses  Recommendations/Intent for next treatment session: \"Next visit will focus on advancements to more challenging activities and reduction in assistance provided\".   Total Treatment Duration:  PT Patient Time In/Time Out  Time In: 1028  Time Out: [address] [facility] Ozzy Tapia

## 2019-07-17 NOTE — PROGRESS NOTES
Problem: Patient Education: Go to Patient Education Activity  Goal: Patient/Family Education  Outcome: Progressing Towards Goal     Problem: Pacer/ICD: Day 1  Goal: Activity/Safety  Outcome: Progressing Towards Goal  Pt up with assistance. Goal: Diagnostic Test/Procedures  Outcome: Progressing Towards Goal  Goal: Nutrition/Diet  Outcome: Progressing Towards Goal  Fair intake.   Goal: Discharge Planning  Outcome: Progressing Towards Goal  Goal: Medications  Outcome: Progressing Towards Goal  Goal: Respiratory  Outcome: Progressing Towards Goal  Goal: Treatments/Interventions/Procedures  Outcome: Progressing Towards Goal

## 2019-07-17 NOTE — PROGRESS NOTES
Bedside and Verbal shift change report given to Cristy Pandya RN (oncoming nurse) by self Berto Aliment nurse). Report included the following information SBAR, Kardex, ED Summary, Procedure Summary, Intake/Output, MAR, Recent Results and Cardiac Rhythm Paced. [facility] pacer site remains c/d/i.

## 2019-07-17 NOTE — PROGRESS NOTES
Received call from [facility] and [address] [place] [address] [phone] and they have offered a bed which is available Thursday 7/18 if patient medically stable.

## 2019-07-17 NOTE — PROGRESS NOTES
Bedside and Verbal shift change report given to Angelica Shoulder RN (oncoming nurse) by self Marah cid). Report included the following information SBAR, Kardex and MAR.

## 2019-07-17 NOTE — PROGRESS NOTES
Bedside and Verbal shift change report given to self (oncoming nurse) by Jade Elder (offgoing nurse). Report included the following information SBAR, Kardex, Intake/Output, MAR and Recent Results.

## 2019-07-18 LAB
ANION GAP SERPL CALC-SCNC: 5 MMOL/L (ref 7–16)
BUN SERPL-MCNC: 15 MG/DL (ref 8–23)
CALCIUM SERPL-MCNC: 8.3 MG/DL (ref 8.3–10.4)
CHLORIDE SERPL-SCNC: 102 MMOL/L (ref 98–107)
CO2 SERPL-SCNC: 29 MMOL/L (ref 21–32)
CREAT SERPL-MCNC: 0.8 MG/DL (ref 0.8–1.5)
GLUCOSE SERPL-MCNC: 80 MG/DL (ref 65–100)
INR PPP: 2.1
MM INDURATION POC: 0 MM (ref 0–5)
MM INDURATION POC: 0 MM (ref 0–5)
POTASSIUM SERPL-SCNC: 3.8 MMOL/L (ref 3.5–5.1)
PPD POC: NEGATIVE NEGATIVE
PPD POC: NEGATIVE NEGATIVE
PROTHROMBIN TIME: 23.4 SEC (ref 11.7–14.5)
SODIUM SERPL-SCNC: 136 MMOL/L (ref 136–145)

## 2019-07-18 PROCEDURE — 74011250637 HC RX REV CODE- 250/637: Performed by: NURSE PRACTITIONER

## 2019-07-18 PROCEDURE — 74011250637 HC RX REV CODE- 250/637: Performed by: INTERNAL MEDICINE

## 2019-07-18 PROCEDURE — 74011250637 HC RX REV CODE- 250/637: Performed by: UROLOGY

## 2019-07-18 PROCEDURE — 65660000000 HC RM CCU STEPDOWN

## 2019-07-18 PROCEDURE — 36415 COLL VENOUS BLD VENIPUNCTURE: CPT

## 2019-07-18 PROCEDURE — 85610 PROTHROMBIN TIME: CPT

## 2019-07-18 PROCEDURE — 80048 BASIC METABOLIC PNL TOTAL CA: CPT

## 2019-07-18 RX ORDER — CARVEDILOL 3.12 MG/1
3.12 TABLET ORAL 2 TIMES DAILY WITH MEALS
Status: DISCONTINUED | OUTPATIENT
Start: 2019-07-18 | End: 2019-07-19 | Stop reason: HOSPADM

## 2019-07-18 RX ORDER — LISINOPRIL 5 MG/1
2.5 TABLET ORAL DAILY
Status: DISCONTINUED | OUTPATIENT
Start: 2019-07-19 | End: 2019-07-19 | Stop reason: HOSPADM

## 2019-07-18 RX ORDER — CARVEDILOL 3.12 MG/1
3.12 TABLET ORAL 2 TIMES DAILY WITH MEALS
Qty: 60 TAB | Refills: 11 | Status: SHIPPED | OUTPATIENT
Start: 2019-07-18 | End: 2019-10-22

## 2019-07-18 RX ORDER — FINASTERIDE 5 MG/1
5 TABLET, FILM COATED ORAL DAILY
Qty: 30 TAB | Refills: 11 | Status: SHIPPED | OUTPATIENT
Start: 2019-07-19

## 2019-07-18 RX ORDER — GUAIFENESIN 100 MG/5ML
200 SOLUTION ORAL
Qty: 1 BOTTLE | Refills: 0 | Status: SHIPPED | OUTPATIENT
Start: 2019-07-18 | End: 2019-10-22

## 2019-07-18 RX ORDER — LISINOPRIL 5 MG/1
5 TABLET ORAL DAILY
Qty: 30 TAB | Refills: 11 | Status: SHIPPED | OUTPATIENT
Start: 2019-07-19 | End: 2019-07-18

## 2019-07-18 RX ORDER — CARVEDILOL 6.25 MG/1
6.25 TABLET ORAL 2 TIMES DAILY WITH MEALS
Qty: 60 TAB | Refills: 11 | Status: SHIPPED | OUTPATIENT
Start: 2019-07-18 | End: 2019-07-18

## 2019-07-18 RX ORDER — DOCUSATE SODIUM 100 MG/1
100 CAPSULE, LIQUID FILLED ORAL
Qty: 60 CAP | Refills: 1 | Status: SHIPPED | OUTPATIENT
Start: 2019-07-18 | End: 2019-10-16

## 2019-07-18 RX ORDER — LISINOPRIL 2.5 MG/1
2.5 TABLET ORAL DAILY
Qty: 30 TAB | Refills: 11 | Status: SHIPPED | OUTPATIENT
Start: 2019-07-19 | End: 2019-10-22

## 2019-07-18 RX ORDER — LISINOPRIL 5 MG/1
2.5 TABLET ORAL DAILY
Qty: 30 TAB | Refills: 11 | Status: SHIPPED | OUTPATIENT
Start: 2019-07-19 | End: 2019-07-18

## 2019-07-18 RX ORDER — CARVEDILOL 6.25 MG/1
3.12 TABLET ORAL 2 TIMES DAILY WITH MEALS
Qty: 60 TAB | Refills: 11 | Status: SHIPPED | OUTPATIENT
Start: 2019-07-18 | End: 2019-07-18

## 2019-07-18 RX ADMIN — ISOSORBIDE MONONITRATE 30 MG: 30 TABLET, EXTENDED RELEASE ORAL at 09:37

## 2019-07-18 RX ADMIN — Medication 5 ML: at 22:19

## 2019-07-18 RX ADMIN — WARFARIN SODIUM 5 MG: 5 TABLET ORAL at 17:31

## 2019-07-18 RX ADMIN — CARVEDILOL 6.25 MG: 6.25 TABLET, FILM COATED ORAL at 09:37

## 2019-07-18 RX ADMIN — LISINOPRIL 5 MG: 5 TABLET ORAL at 09:37

## 2019-07-18 RX ADMIN — SIMVASTATIN 20 MG: 20 TABLET, FILM COATED ORAL at 22:19

## 2019-07-18 RX ADMIN — DOXAZOSIN 4 MG: 1 TABLET ORAL at 09:36

## 2019-07-18 RX ADMIN — Medication 5 ML: at 05:17

## 2019-07-18 RX ADMIN — FINASTERIDE 5 MG: 5 TABLET, FILM COATED ORAL at 09:37

## 2019-07-18 RX ADMIN — CARVEDILOL 3.12 MG: 3.12 TABLET, FILM COATED ORAL at 17:31

## 2019-07-18 RX ADMIN — ACETAMINOPHEN 650 MG: 325 TABLET, FILM COATED ORAL at 05:17

## 2019-07-18 NOTE — PROGRESS NOTES
Care Management Interventions  PCP Verified by CM: Yes(has appointment with new PCP August 19)  Palliative Care Criteria Met (RRAT>21 & CHF Dx)?: No(RRAT 13 Dx Bradycardia )  Transition of Care Consult (CM Consult): SNF  Partner SNF: No  Reason Why Partner SNF Not Chosen: Location([facility] and rehab)  Discharge Durable Medical Equipment: No  Physical Therapy Consult: Yes  Occupational Therapy Consult: Yes  Speech Therapy Consult: No  Current Support Network: Lives with Spouse  Confirm Follow Up Transport: Self  Plan discussed with Pt/Family/Caregiver: Yes  Freedom of Choice Offered: Yes  Discharge Location  Discharge Placement: Skilled nursing facility  Patient ready for d/c today. He is in agreement with d/c to [facility] and rehab. Report can be called to [phone]. He can transport after 48 hour PPD completed. Transport set up for 2 pm by [address] ambulance. SNF packet with H/P, PT/OT, Urology notes, AVS, MAR, SNF orders, D/C Summary and Scripts to be sent with ambulance staff. Addendum Notified Quique Armendariz patient friend per patient request of d/c to rehab today and she will notify his wife.

## 2019-07-18 NOTE — PROGRESS NOTES
Bedside and Verbal shift change report given to [facility] (oncoming nurse) by self Mark cid). Report included the following information SBAR, Kardex and MAR.

## 2019-07-18 NOTE — PROGRESS NOTES
Bedside and Verbal shift change report given to self (oncoming nurse) by Caitie Tian RN (offgoing nurse). Report included the following information SBAR, Kardex and MAR.

## 2019-07-18 NOTE — PROGRESS NOTES
TRANSFER - OUT REPORT:    Verbal report given to Priscilla RN(name) on Lalito Small  being transferred to [facility]) for routine progression of care       Report consisted of patients Situation, Background, Assessment and   Recommendations(SBAR). Information from the following report(s) SBAR, Kardex and MAR was reviewed with the receiving nurse. Lines:       Opportunity for questions and clarification was provided. Patient transported with:   grimes catheter, patient belongings      Celsa Méndez on unit to take patient. Gagandeep Christie NP on unit adjusting patient's medications. Per NP patient ok to go to rehab once discharge summary reprinted. Lupis  updated discharge paperwork and provided it to transporters.

## 2019-07-18 NOTE — PROGRESS NOTES
Spoke with Marie Espinal NP about patient BP 86/45. Transport here to take patient to rehab. Per NP hold transport until NP speaks with MD Chetan Li comes to floor to assess. Pt asymptomatic at rest, weak when walking back and forth from bathroom.

## 2019-07-18 NOTE — PROGRESS NOTES
Bedside and Verbal shift change report given to Angie Hoang RN (oncoming nurse) by self Carla cid). Report included the following information SBAR, Kardex, Intake/Output, MAR, Recent Results and Cardiac Rhythm Paced w/ PVCs occaisionally. L subclavian pacer site remains unchanged from offgoing assessment.

## 2019-07-18 NOTE — PROGRESS NOTES
Discussed patient BP with Yen Gu NP, meds reviewed. Received order to hold morning IV lasix dose. Will hold and continue to monitor.

## 2019-07-18 NOTE — PROGRESS NOTES
To request a  after 4:30 p.m., please call Christiana at [phone]. Thank you,      Clarisa Santos, [address] [facility] [address] /  [email or web address] c: [phone] / [address] / Claudia, [address] [facility]  [email or web address]

## 2019-07-18 NOTE — PROGRESS NOTES
Warfarin dosing per pharmacist    Lisa Lazo is a 82 y.o. male. Height: 5' 4\" (162.6 cm)    Weight: 59.1 kg (130 lb 3.2 oz)    Indication:  AVR ([facility] mechanical valve 1990)    Goal INR:  2-3    Home dose:  5 mg po qpm    Risk factors or significant drug interactions:  none    Other anticoagulants:  none (Lovenox stopped 7/16)    Daily Monitoring  Date  INR     Warfarin dose HGB              Notes  7/15  1.8  5mg  12.6    7/16  1.4      2.5mg + 5mg 12.2  7/17  1.9  5 mg  --  7/18  2.1  5mg  --    Patient admitted for pacemaker and warfarin was held and patient was bridged with heparin prior to procedure. Heparin stopped and warfarin restarted 7/15 post procedure. INR is now therapeutic at 2.1. Continue home regimen of 5 mg daily. Monitor INR daily. Will continue to follow.       Thank you,  Kanchan Woodward, PharmD  Clinical Pharmacy PGY1 Resident   [phone]

## 2019-07-18 NOTE — DISCHARGE SUMMARY
[facility] Cardiology Discharge Summary     Patient ID:  Marylen Hawthorn  [id number]  67 y.o.  1935    Admit date: 7/13/2019    Discharge date and time:  07/19/19    Admitting Physician: Torrie Doran MD     Discharge Physician: Stuart Malik NP/Dr. Jackson Meraz    Admission Diagnoses: Bradycardia [R00.1]    Discharge Diagnoses:    Diagnosis    Symptomatic bradycardia    Bradycardia    Hx of CABG    Acute on chronic systolic congestive heart failure ([facility] [address]    PND (paroxysmal nocturnal dyspnea)    S/P AVR    Complete heart block (HCC)    Syncope    Second-degree heart block; MOBITZ I; WENCKEB    Abnormal EKG    Essential hypertension    Coronary atherosclerosis of native coronary vessel    Malignant neoplasm of prostate ([facility] [address]    HLD (hyperlipidemia)       Cardiology Procedures this admission:  Pacemaker Implantation, CXR  Consults: Urology, PT    Hospital Course: Pt was admitted with Sick Sinus Syndrome. Pt was scheduled for an AM Admission BiV PPM implantation at [facility] on 7/15/2019 . Pt came in to [facility] and was taken to the EP lab by Dr. Khanh George. Pt rceived a [employer] BiV pacemaker without complication. Follow up CXR showed no pneumothorax. The patient's device was interrogated prir to d/c showing normal function. Pt tolerated the procedure well and was taken to the telemetry floor for recovery. The morning of discharge, the patient was hypotensive requiring to remain in hospital for medication adjustments. The patient was noted to have urinary retention and per Urology \"I have increased his doxazosin from 2 mg to his home dosage of 4 mg's daily. I have added finasteride daily. Leave grimes in place at least 36-48 more hours due to medication change and can then remove for trial of void. IF still unable to void, continue medications, replace a coude tip grimes, and follow up with his primary urology team at [facility]. \"    Per PT/OT \" It is my opinion, based on this patient's performance to date, that Mr. Yvette Cano may benefit from intensive therapy at a [address] [place] [address] after discharge due to the functional deficits listed above that are likely to improve with skilled rehabilitation and concerns that he/she may be unsafe to be unsupervised at home due to impaired ADLs and mobility, fall risk . \"    Pt was up feeling well without any complaints of CP, SOB or palpitations. Pt's left subclavian PM site was clean, dry and intact without hematoma. Pt's labs were WNL. Pt was seen and examined by Dr. Erika Negro and determined stable and ready for discharge. Pt was instructed to follow PM discharge instructions given by nursing staff. The patient will need an INR check on Monday at rehab center and send to our office. For patient's HFrEF, he will be d/c to rehab center on coreg and ACE. He will also continue home med  Lasix. The patient will see [address] [place] [address] [address] cardiology [facility] on 7/31/19 at 10 am for device/site check. DISPOSITION: The patient is being discharged home in stable condition on a low saturated fat, low cholesterol and low salt diet. Pt is instructed to advance activities as tolerated limited to fatigue or shortness of breath. Pt is instructed not to lift anything over 5-10 lbs with affected arm. No pushing or pulling or lifting arm above shoulder. See complete discharge instructions. Pt is instructed to watch PM site for bleeding/oozing, if seen Pt is instructed to apply firm pressure with clean cloth and call office at [phone]. Pt is instructed to watch for signs of infection which include increasing area of redness around site, fever/hot to touch or purulent drainage. Pt is instructed to call office or return to ER for immediate evaluation of any shortness of breath, chest pain or palpitations.       Discharge Exam:   Visit Vitals  /77   Pulse 73   Temp 98.9 °F (37.2 °C)   Resp 18   Ht 5' 4\" (1.626 m)   Wt 141 lb 3.2 oz (64 kg)   SpO2 99%   BMI 24.24 kg/m² Pt has been seen by Maye Lin MD: see his progress note for exam details. Recent Results (from the past 24 hour(s))   PLEASE READ & DOCUMENT PPD TEST IN 48 HRS    Collection Time: 07/18/19  1:33 PM   Result Value Ref Range    PPD Negative Negative    mm Induration 0 0 - 5 mm   PROTHROMBIN TIME + INR    Collection Time: 07/19/19  5:09 AM   Result Value Ref Range    Prothrombin time 24.7 (H) 11.7 - 14.5 sec    INR 2.3           Patient Instructions:     Current Discharge Medication List      START taking these medications    Details   finasteride (PROSCAR) 5 mg tablet Take 1 Tab by mouth daily. Qty: 30 Tab, Refills: 11      docusate sodium (COLACE) 100 mg capsule Take 1 Cap by mouth two (2) times daily as needed for Constipation for up to 90 days. Qty: 60 Cap, Refills: 1      guaiFENesin (ROBITUSSIN) 100 mg/5 mL liquid Take 10 mL by mouth every six (6) hours as needed for Cough. Qty: 1 Bottle, Refills: 0      carvedilol (COREG) 3.125 mg tablet Take 1 Tab by mouth two (2) times daily (with meals). Qty: 60 Tab, Refills: 11      lisinopril (PRINIVIL, ZESTRIL) 2.5 mg tablet Take 1 Tab by mouth daily. Qty: 30 Tab, Refills: 11         CONTINUE these medications which have NOT CHANGED    Details   isosorbide mononitrate ER (IMDUR) 30 mg tablet Take 1 Tab by mouth daily. Qty: 90 Tab, Refills: 3      simvastatin (ZOCOR) 20 mg tablet Take 1 Tab by mouth nightly. Qty: 90 Tab, Refills: 3      furosemide (LASIX) 20 mg tablet Take 20 mg by mouth two (2) times a day. meclizine (ANTIVERT) 25 mg tablet Take  by mouth three (3) times daily as needed. warfarin (COUMADIN) 5 mg tablet Take 5 mg by mouth daily. Take as directed      doxazosin (CARDURA) 4 mg tablet Take 4 mg by mouth daily.  1/2 tab daily               Signed:  Enrique aBng NP  7/19/2019  9:48 AM

## 2019-07-18 NOTE — PROGRESS NOTES
Patient is unable to go to rehab today due to low B/P and needing monitoring of B/P and medications to be adjusted. CM notified [facility] health and rehab and Neil Alford of cancelled d/c.

## 2019-07-18 NOTE — PROGRESS NOTES
Spoke with patient and Fabio Renner NP at patient bedside. NP Fabio Renner states that patient ok to go to rehab per cardiology. Patient stated that he did not feel safe going to rehab with his blood pressure being lower than normal. Pt states he does not feel weak or have any new symptoms at this time. Pt resting in chair comfortably. Per Fabio Renner patient transfer to be canceled and patient to stay another night to monitor BP's. Decrease lisinopril to 2.5 mg daily and coreg 3.125 BID with meals. Will adjust medications and continue to monitor. Lupis  updated and  to contact patient family.

## 2019-07-18 NOTE — DISCHARGE SUMMARY
[facility] Cardiology Discharge Summary     Patient ID:  Sudha Balderas  [id number]  89 y.o.  1935    Admit date: 7/13/2019    Discharge date and time:  07/18/19    Admitting Physician: Markus White MD     Discharge Physician: Martha Gorman NP/Dr. Herlinda Pagan    Admission Diagnoses: Bradycardia [R00.1]    Discharge Diagnoses:    Diagnosis    Symptomatic bradycardia    Bradycardia    Hx of CABG    Acute on chronic systolic congestive [facility] failure [facility] [address]    PND (paroxysmal nocturnal dyspnea)    S/P AVR    Complete heart block (HCC)    Syncope    Second-degree heart block; MOBITZ I; WENCKEB    Abnormal EKG    Essential hypertension    Coronary atherosclerosis of native coronary vessel    Malignant neoplasm of prostate ([facility] [address]    HLD (hyperlipidemia)       Cardiology Procedures this admission:  Pacemaker Implantation, CXR  Consults: Urology, PT    Hospital Course: Pt was admitted with Sick Sinus Syndrome. Pt was scheduled for an AM Admission BiV PPM implantation at [facility] on 7/15/2019 . Pt came in to [facility] and was taken to the EP lab by Dr. Ghulam Yepez. Pt rceived a Biotronic BiV pacemaker without complication. Follow up CXR showed no pneumothorax. Pt tolerated the procedure well and was taken to the telemetry floor for recovery. The morning of discharge     The patient was noted to have urin retention and per Urology \"I have increased his doxazosin from 2 mg to his home dosage of 4 mg's daily. I have added finasteride daily. Leave grimes in place at least 36-48 more hours due to medication change and can then remove for trial of void. IF still unable to void, continue medications, replace a coude tip grimes, and follow up with his primary urology team at [facility]. \"    Per PT/OT \" It is my opinion, based on this patient's performance to date, that Mr. Charles Ervin may benefit from intensive therapy at a [address] [place] [address] after discharge due to the functional deficits listed above that are likely to improve with skilled rehabilitation and concerns that he/she may be unsafe to be unsupervised at home due to impaired ADLs and mobility, fall risk . \"    Pt was up feeling well without any complaints of CP, SOB or palpitations. Pt's left subclavian PM site was clean, dry and intact without hematoma. Pt's labs were WNL. Pt was seen and examined by Dr. Ghulam Yepez and determined stable and ready for discharge. Pt was instructed to follow PM discharge instructions given by nursing staff. The patient will need an INR check on Friday. DISPOSITION: The patient is being discharged home in stable condition on a low saturated fat, low cholesterol and low salt diet. Pt is instructed to advance activities as tolerated limited to fatigue or shortness of breath. Pt is instructed not to lift anything over 5-10 lbs with affected arm. No pushing or pulling or lifting arm above shoulder. See complete discharge instructions. Pt is instructed to watch PM site for bleeding/oozing, if seen Pt is instructed to apply firm pressure with clean cloth and call office at [phone]. Pt is instructed to watch for signs of infection which include increasing area of redness around site, fever/hot to touch or purulent drainage. Pt is instructed to call office or return to ER for immediate evaluation of any shortness of breath, chest pain or palpitations. Discharge Exam:   Visit Vitals  /61   Pulse 71   Temp 98 °F (36.7 °C)   Resp 17   Ht 5' 4\" (1.626 m)   Wt 59.1 kg (130 lb 3.2 oz)   SpO2 96%   BMI 22.35 kg/m²      Pt has been seen by Ghulam Yepez MD: see his progress note for exam details.     Recent Results (from the past 24 hour(s))   PLEASE READ & DOCUMENT PPD TEST IN 24 HRS    Collection Time: 07/17/19  1:33 PM   Result Value Ref Range    PPD      mm Induration     PLEASE READ & DOCUMENT PPD TEST IN 24 HRS    Collection Time: 07/17/19  1:33 PM   Result Value Ref Range    PPD Negative Negative    mm Induration 0 0 - 5 mm METABOLIC PANEL, BASIC    Collection Time: 07/18/19  4:14 AM   Result Value Ref Range    Sodium 136 136 - 145 mmol/L    Potassium 3.8 3.5 - 5.1 mmol/L    Chloride 102 98 - 107 mmol/L    CO2 29 21 - 32 mmol/L    Anion gap 5 (L) 7 - 16 mmol/L    Glucose 80 65 - 100 mg/dL    BUN 15 8 - 23 MG/DL    Creatinine 0.80 0.8 - 1.5 MG/DL    GFR est AA >60 >60 ml/min/1.73m2    GFR est non-AA >60 >60 ml/min/1.73m2    Calcium 8.3 8.3 - 10.4 MG/DL   PROTHROMBIN TIME + INR    Collection Time: 07/18/19  4:14 AM   Result Value Ref Range    Prothrombin time 23.4 (H) 11.7 - 14.5 sec    INR 2.1           Patient Instructions:     Current Discharge Medication List      START taking these medications    Details   carvedilol (COREG) 6.25 mg tablet Take 1 Tab by mouth two (2) times daily (with meals). Qty: 60 Tab, Refills: 11      lisinopril (PRINIVIL, ZESTRIL) 5 mg tablet Take 1 Tab by mouth daily. Qty: 30 Tab, Refills: 11      finasteride (PROSCAR) 5 mg tablet Take 1 Tab by mouth daily. Qty: 30 Tab, Refills: 11      docusate sodium (COLACE) 100 mg capsule Take 1 Cap by mouth two (2) times daily as needed for Constipation for up to 90 days. Qty: 60 Cap, Refills: 1      guaiFENesin (ROBITUSSIN) 100 mg/5 mL liquid Take 10 mL by mouth every six (6) hours as needed for Cough. Qty: 1 Bottle, Refills: 0         CONTINUE these medications which have NOT CHANGED    Details   isosorbide mononitrate ER (IMDUR) 30 mg tablet Take 1 Tab by mouth daily. Qty: 90 Tab, Refills: 3      simvastatin (ZOCOR) 20 mg tablet Take 1 Tab by mouth nightly. Qty: 90 Tab, Refills: 3      furosemide (LASIX) 20 mg tablet Take 20 mg by mouth two (2) times a day. meclizine (ANTIVERT) 25 mg tablet Take  by mouth three (3) times daily as needed. warfarin (COUMADIN) 5 mg tablet Take 5 mg by mouth daily. Take as directed      doxazosin (CARDURA) 4 mg tablet Take 4 mg by mouth daily.  1/2 tab daily               Signed:  Cornelius Urban NP  7/18/2019  9:48 AM

## 2019-07-18 NOTE — PROGRESS NOTES
available 24/7 using the [facility] [employer] for Video Remote Interpreting (VRI)   For on-site interpreters please call [phone].

## 2019-07-18 NOTE — PROGRESS NOTES
[facility] CARDIOLOGY PROGRESS NOTE           7/18/2019 6:46 AM    Admit Date: 7/13/2019    Admit Diagnosis: Bradycardia [R00.1]      Subjective:   No complaints this AM, no chest pain or shortness of breath    Interval History: (History of pertinent interval events obtained from nursing staff)    ROS:  GEN:  No fever or chills  Cardiovascular:  As noted above  Pulmonary:  As noted above  Neuro:  No new focal motor or sensory loss      Objective:     Vitals:    07/17/19 1701 07/17/19 2116 07/18/19 0033 07/18/19 0427   BP: 109/57 100/54 152/61 123/70   Pulse: 79 80 70 65   Resp: 18 18 19 18   Temp: 98.6 °F (37 °C) 100 °F (37.8 °C) 98.9 °F (37.2 °C) 98 °F (36.7 °C)   SpO2: 96% 94% 96% 93%   Weight:    130 lb 3.2 oz (59.1 kg)   Height:           Physical Exam:  General-Well Developed, Well Nourished, No Acute Distress, Alert & Oriented x 3, appropriate mood. CV- regular rate and rhythm no MRG, left infraclavicular pocket with dressing in place, no evidence of hematoma, redness, warmth or excessive drainage  Lung- clear bilaterally  Abd- soft, nontender, nondistended  Ext- no edema bilaterally.     Current Facility-Administered Medications   Medication Dose Route Frequency    doxazosin (CARDURA) tablet 4 mg  4 mg Oral DAILY    finasteride (PROSCAR) tablet 5 mg  5 mg Oral DAILY    sodium chloride (NS) flush 5-40 mL  5-40 mL IntraVENous Q8H    sodium chloride (NS) flush 5-40 mL  5-40 mL IntraVENous PRN    acetaminophen (TYLENOL) tablet 650 mg  650 mg Oral Q4H PRN    HYDROcodone-acetaminophen (NORCO) 5-325 mg per tablet 1 Tab  1 Tab Oral Q4H PRN    ondansetron (ZOFRAN) injection 4 mg  4 mg IntraVENous Q4H PRN    docusate sodium (COLACE) capsule 100 mg  100 mg Oral BID PRN    warfarin (COUMADIN) tablet 5 mg  5 mg Oral QPM    carvedilol (COREG) tablet 6.25 mg  6.25 mg Oral BID WITH MEALS    simethicone (MYLICON) tablet 80 mg  80 mg Oral QID PRN    isosorbide mononitrate ER (IMDUR) tablet 30 mg 30 mg Oral DAILY    simvastatin (ZOCOR) tablet 20 mg  20 mg Oral QHS    nitroglycerin (NITROSTAT) tablet 0.4 mg  0.4 mg SubLINGual Q5MIN PRN    morphine injection 2 mg  2 mg IntraVENous Q4H PRN    naloxone (NARCAN) injection 0.4 mg  0.4 mg IntraVENous PRN    alum-mag hydroxide-simeth (MYLANTA) oral suspension 30 mL  30 mL Oral Q4H PRN    diphenhydrAMINE (BENADRYL) capsule 50 mg  50 mg Oral QHS PRN    furosemide (LASIX) injection 40 mg  40 mg IntraVENous DAILY    lisinopril (PRINIVIL, ZESTRIL) tablet 5 mg  5 mg Oral DAILY    guaiFENesin (ROBITUSSIN) 100 mg/5 mL oral liquid 200 mg  200 mg Oral Q6H PRN     Data Review:   Recent Results (from the past 24 hour(s))   PLEASE READ & DOCUMENT PPD TEST IN 24 HRS    Collection Time: 07/17/19  1:33 PM   Result Value Ref Range    PPD      mm Induration     METABOLIC PANEL, BASIC    Collection Time: 07/18/19  4:14 AM   Result Value Ref Range    Sodium 136 136 - 145 mmol/L    Potassium 3.8 3.5 - 5.1 mmol/L    Chloride 102 98 - 107 mmol/L    CO2 29 21 - 32 mmol/L    Anion gap 5 (L) 7 - 16 mmol/L    Glucose 80 65 - 100 mg/dL    BUN 15 8 - 23 MG/DL    Creatinine 0.80 0.8 - 1.5 MG/DL    GFR est AA >60 >60 ml/min/1.73m2    GFR est non-AA >60 >60 ml/min/1.73m2    Calcium 8.3 8.3 - 10.4 MG/DL   PROTHROMBIN TIME + INR    Collection Time: 07/18/19  4:14 AM   Result Value Ref Range    Prothrombin time 23.4 (H) 11.7 - 14.5 sec    INR 2.1         EKG:  (EKG has been independently visualized by me with interpretation below)  Assessment:     Principal Problem:    Symptomatic bradycardia (7/13/2019)    Active Problems:    Essential hypertension (1/9/2014)      Coronary atherosclerosis of native coronary vessel (7/26/2012)      HLD (hyperlipidemia) (7/26/2012)      Second-degree heart block; MOBITZ I; 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  Cardiac device implantation: Pt device interrogation post implant reveals normal function, excellent pacing and sensing parameters, CXR without pneumothorax with excellent device position, no recognized complications. 2. CHF: cont OMT, euvolemic today  3. Mechanical AV: on warfarin, INR 2.1 today. 4. Urinary obstruction - appreciate urology recs. 5. Dispo - today vs tomorrow, possible SNF. Leana [place].  Peng Avina MD, [facility] Cardiac Electrophysiology  [facility] Cardiology

## 2019-07-18 NOTE — DISCHARGE SUMMARY
[facility] Cardiology Discharge Summary     Patient ID:  Lalito Small  [id number]  64 y.o.  1935    Admit date: 7/13/2019    Discharge date and time:  07/18/19    Admitting Physician: Rola Jackson MD     Discharge Physician: Shonda Solano NP/Dr. Soila Concepcion    Admission Diagnoses: Bradycardia [R00.1]    Discharge Diagnoses:    Diagnosis    Symptomatic bradycardia    Bradycardia    Hx of CABG    Acute on chronic systolic congestive heart failure ([facility] [address]    PND (paroxysmal nocturnal dyspnea)    S/P AVR    Complete heart block (HCC)    Syncope    Second-degree heart block; MOBITZ I; WENCKEB    Abnormal EKG    Essential hypertension    Coronary atherosclerosis of native coronary vessel    Malignant neoplasm of prostate ([facility] [address]    HLD (hyperlipidemia)       Cardiology Procedures this admission:  Pacemaker Implantation, CXR  Consults: Urology, PT    Hospital Course: Pt was admitted with Sick Sinus Syndrome. Pt was scheduled for an AM Admission BiV PPM implantation at [facility] on 7/15/2019 . Pt came in to [facility] and was taken to the EP lab by Dr. Yariel Oh. Pt rceived a Biot[employer] BiV pacemaker without complication. Follow up CXR showed no pneumothorax. Pt tolerated the procedure well and was taken to the telemetry floor for recovery. The morning of discharge     The patient was noted to have urin retention and per Urology \"I have increased his doxazosin from 2 mg to his home dosage of 4 mg's daily. I have added finasteride daily. Leave grimes in place at least 36-48 more hours due to medication change and can then remove for trial of void. IF still unable to void, continue medications, replace a coude tip grimes, and follow up with his primary urology team at [facility]. \"    Per PT/OT \" It is my opinion, based on this patient's performance to date, that Mr. Larinda Najjar may benefit from intensive therapy at a [address] [place] [address] after discharge due to the functional deficits listed above that are likely to improve with skilled rehabilitation and concerns that he/she may be unsafe to be unsupervised at home due to impaired ADLs and mobility, fall risk . \"    Pt was up feeling well without any complaints of CP, SOB or palpitations. Pt's left subclavian PM site was clean, dry and intact without hematoma. Pt's labs were WNL. Pt was seen and examined by Dr. Rosanne Gilbert and determined stable and ready for discharge. Pt was instructed to follow PM discharge instructions given by nursing staff. The patient will need an INR check on Friday. DISPOSITION: The patient is being discharged home in stable condition on a low saturated fat, low cholesterol and low salt diet. Pt is instructed to advance activities as tolerated limited to fatigue or shortness of breath. Pt is instructed not to lift anything over 5-10 lbs with affected arm. No pushing or pulling or lifting arm above shoulder. See complete discharge instructions. Pt is instructed to watch PM site for bleeding/oozing, if seen Pt is instructed to apply firm pressure with clean cloth and call office at [phone]. Pt is instructed to watch for signs of infection which include increasing area of redness around site, fever/hot to touch or purulent drainage. Pt is instructed to call office or return to ER for immediate evaluation of any shortness of breath, chest pain or palpitations. Discharge Exam:   Visit Vitals  BP (!) 86/45   Pulse 64   Temp 97.9 °F (36.6 °C)   Resp 17   Ht 5' 4\" (1.626 m)   Wt 59.1 kg (130 lb 3.2 oz)   SpO2 96%   BMI 22.35 kg/m²      Pt has been seen by Rosanne Gilbert MD: see his progress note for exam details.     Recent Results (from the past 24 hour(s))   METABOLIC PANEL, BASIC    Collection Time: 07/18/19  4:14 AM   Result Value Ref Range    Sodium 136 136 - 145 mmol/L    Potassium 3.8 3.5 - 5.1 mmol/L    Chloride 102 98 - 107 mmol/L    CO2 29 21 - 32 mmol/L    Anion gap 5 (L) 7 - 16 mmol/L    Glucose 80 65 - 100 mg/dL    BUN 15 8 - 23 MG/DL Creatinine 0.80 0.8 - 1.5 MG/DL    GFR est AA >60 >60 ml/min/1.73m2    GFR est non-AA >60 >60 ml/min/1.73m2    Calcium 8.3 8.3 - 10.4 MG/DL   PROTHROMBIN TIME + INR    Collection Time: 07/18/19  4:14 AM   Result Value Ref Range    Prothrombin time 23.4 (H) 11.7 - 14.5 sec    INR 2.1     PLEASE READ & DOCUMENT PPD TEST IN 48 HRS    Collection Time: 07/18/19  1:33 PM   Result Value Ref Range    PPD Negative Negative    mm Induration 0 0 - 5 mm         Patient Instructions:     Current Discharge Medication List      START taking these medications    Details   finasteride (PROSCAR) 5 mg tablet Take 1 Tab by mouth daily. Qty: 30 Tab, Refills: 11      docusate sodium (COLACE) 100 mg capsule Take 1 Cap by mouth two (2) times daily as needed for Constipation for up to 90 days. Qty: 60 Cap, Refills: 1      guaiFENesin (ROBITUSSIN) 100 mg/5 mL liquid Take 10 mL by mouth every six (6) hours as needed for Cough. Qty: 1 Bottle, Refills: 0      carvedilol (COREG) 3.125 mg tablet Take 0.5 Tabs by mouth two (2) times daily (with meals). Qty: 60 Tab, Refills: 11      lisinopril (PRINIVIL, ZESTRIL) 2.5 mg tablet Take 0.5 Tabs by mouth daily. Qty: 30 Tab, Refills: 11         CONTINUE these medications which have NOT CHANGED    Details   isosorbide mononitrate ER (IMDUR) 30 mg tablet Take 1 Tab by mouth daily. Qty: 90 Tab, Refills: 3      simvastatin (ZOCOR) 20 mg tablet Take 1 Tab by mouth nightly. Qty: 90 Tab, Refills: 3      furosemide (LASIX) 20 mg tablet Take 20 mg by mouth two (2) times a day. meclizine (ANTIVERT) 25 mg tablet Take  by mouth three (3) times daily as needed. warfarin (COUMADIN) 5 mg tablet Take 5 mg by mouth daily. Take as directed      doxazosin (CARDURA) 4 mg tablet Take 4 mg by mouth daily.  1/2 tab daily               Signed:  Sancho Huang NP  7/18/2019  9:48 AM

## 2019-07-18 NOTE — PROGRESS NOTES
Bedside and Verbal shift change report given to self (oncoming nurse) by [facility], RN (offgoing nurse). Report included the following information SBAR, Kardex, MAR and Recent Results.

## 2019-07-18 NOTE — PROGRESS NOTES
DC delayed to hypotension. Pt has same weakness that he had with normal BP but wants to be cautious with new medications. Medications were adjusted and will evaluate overnight for possible DC tomorrow. Dr Starks Mort aware.       Samir Key NP  07/18/19  2255

## 2019-07-18 NOTE — PROGRESS NOTES
Problem: Pacer/ICD: Discharge Outcomes  Goal: *Hemodynamically stable  Outcome: Progressing Towards Goal  Goal: *Stable cardiac rhythm  Outcome: Progressing Towards Goal  Goal: *Lungs clear or at baseline  Outcome: Progressing Towards Goal  Goal: *Optimal pain control at patient's stated goal  Outcome: Progressing Towards Goal     Problem: Falls - Risk of  Goal: *Absence of Falls  Description  Document Radha Bonilla Fall Risk and appropriate interventions in the flowsheet.   Outcome: Progressing Towards Goal  Note:   Fall Risk Interventions:  Mobility Interventions: Communicate number of staff needed for ambulation/transfer, Patient to call before getting OOB, Bed/chair exit alarm         Medication Interventions: Bed/chair exit alarm, Patient to call before getting OOB, Teach patient to arise slowly         History of Falls Interventions: Bed/chair exit alarm, Evaluate medications/consider consulting pharmacy         Problem: Patient Education: Go to Patient Education Activity  Goal: Patient/Family Education  Outcome: Progressing Towards Goal

## 2019-07-19 VITALS
SYSTOLIC BLOOD PRESSURE: 100 MMHG | RESPIRATION RATE: 18 BRPM | OXYGEN SATURATION: 98 % | DIASTOLIC BLOOD PRESSURE: 58 MMHG | HEART RATE: 71 BPM | BODY MASS INDEX: 24.11 KG/M2 | WEIGHT: 141.2 LBS | HEIGHT: 64 IN | TEMPERATURE: 98.7 F

## 2019-07-19 LAB
INR PPP: 2.3
PROTHROMBIN TIME: 24.7 SEC (ref 11.7–14.5)

## 2019-07-19 PROCEDURE — 74011250637 HC RX REV CODE- 250/637: Performed by: INTERNAL MEDICINE

## 2019-07-19 PROCEDURE — 85610 PROTHROMBIN TIME: CPT

## 2019-07-19 PROCEDURE — 36415 COLL VENOUS BLD VENIPUNCTURE: CPT

## 2019-07-19 PROCEDURE — 74011250636 HC RX REV CODE- 250/636: Performed by: INTERNAL MEDICINE

## 2019-07-19 PROCEDURE — 74011250637 HC RX REV CODE- 250/637: Performed by: UROLOGY

## 2019-07-19 PROCEDURE — 74011250637 HC RX REV CODE- 250/637: Performed by: NURSE PRACTITIONER

## 2019-07-19 RX ADMIN — CARVEDILOL 3.12 MG: 3.12 TABLET, FILM COATED ORAL at 10:41

## 2019-07-19 RX ADMIN — FINASTERIDE 5 MG: 5 TABLET, FILM COATED ORAL at 10:40

## 2019-07-19 RX ADMIN — DOXAZOSIN 4 MG: 1 TABLET ORAL at 10:38

## 2019-07-19 RX ADMIN — ISOSORBIDE MONONITRATE 30 MG: 30 TABLET, EXTENDED RELEASE ORAL at 10:41

## 2019-07-19 RX ADMIN — FUROSEMIDE 40 MG: 10 INJECTION, SOLUTION INTRAMUSCULAR; INTRAVENOUS at 10:42

## 2019-07-19 RX ADMIN — LISINOPRIL 2.5 MG: 5 TABLET ORAL at 09:00

## 2019-07-19 RX ADMIN — Medication 5 ML: at 05:22

## 2019-07-19 NOTE — PROGRESS NOTES
Report called to [facility] care. They understand to make an appointment for urology next week for his grimes catheter.    Awaiting transport at 1:15pm.

## 2019-07-19 NOTE — PROGRESS NOTES
Urology notified of patient not d/c'ed on 7/18/2019. Orders received regarding if patient discharged or not from [place], [place]. Pt being discharged. Orders placed per PA.

## 2019-07-19 NOTE — PROGRESS NOTES
Care Management Interventions  PCP Verified by CM: Yes(has appointment with new PCP August 19)  Palliative Care Criteria Met (RRAT>21 & CHF Dx)?: No(RRAT 13 Dx Bradycardia )  Mode of Transport at Discharge: BLS(transport time 115 pm)  Transition of Care Consult (CM Consult): SNF  Partner SNF: No  Reason Why Partner SNF Not Chosen: Location([facility])  Discharge Durable Medical Equipment: No  Physical Therapy Consult: Yes  Occupational Therapy Consult: Yes  Speech Therapy Consult: No  Current Support Network: Lives with Spouse  Confirm Follow Up Transport: Self  Plan discussed with Pt/Family/Caregiver: Yes  Freedom of Choice Offered: Yes  Discharge Location  Discharge Placement: Skilled nursing facility  Patient ready for d/c today. Notified Sona from [facility] and sent d/c summary per request. Notified patient friend Edmond Davidson of d/c and she will let his wife know. Patient informed of d/c plan and in agreement with d/c. Patient to transfer by [facility] ambulance and SNF packet with ambulance staff.

## 2019-07-19 NOTE — PROGRESS NOTES
Bedside report received from Lonny Wilson RN. Opportunity for questions, concerns. Patient involved.

## 2019-07-19 NOTE — DISCHARGE INSTRUCTIONS
Urinary Retention: Care Instructions  Your Care Instructions    Urinary retention means that you aren't able to urinate. In men, it is often caused by a blockage of the urinary tract from an enlarged prostate gland. In men and women, it can also be caused by an infection or nerve damage. Or it may be a side effect of a medicine. The doctor may have drained the urine from your bladder. If you still have problems passing urine, you may need to use a catheter at home. This is used to empty your bladder until the problem can be fixed. Your doctor may put a catheter in your bladder before you go home. If so, he or she will tell you when to come back to have the catheter removed. The doctor has checked you closely. But problems can develop later. If you notice any problems or new symptoms, get medical treatment right away. Follow-up care is a key part of your treatment and safety. Be sure to make and go to all appointments, and call your doctor if you are having problems. It's also a good idea to know your test results and keep a list of the medicines you take. How can you care for yourself at home? · Take your medicines exactly as prescribed. Call your doctor if you think you are having a problem with your medicine. You will get more details on the specific medicines your doctor prescribes. · Check with your doctor before you use any over-the-counter medicines. Many cold and allergy medicines, for example, can make this problem worse. Make sure your doctor knows all of the medicines, vitamins, supplements, and herbal remedies you take. · Spread out through the day the amount of fluid you drink. Do not drink a lot at bedtime. · Avoid alcohol and caffeine. · If you have been given a catheter, or if one is already in place, follow the instructions you were given. Always wash your hands before and after you handle the catheter. When should you call for help?   Call your doctor now or seek immediate medical care if:    · You cannot urinate at all, or it is getting harder to urinate.     · You have symptoms of a urinary tract infection. These may include:  ? Pain or burning when you urinate. ? A frequent need to urinate without being able to pass much urine. ? Pain in the flank, which is just below the rib cage and above the waist on either side of the back. ? Blood in your urine. ? A fever.    Watch closely for changes in your health, and be sure to contact your doctor if:    · You have any problems with your catheter.     · You do not get better as expected. Where can you learn more? Go to http://clinton-avelino.info/. Enter M244 in the search box to learn more about \"Urinary Retention: Care Instructions. \"  Current as of: March 20, 2018  Content Version: 11.9  © 6253-6348 [employer]. Care instructions adapted under license by [employer] (which disclaims liability or warranty for this information). If you have questions about a medical condition or this instruction, always ask your healthcare professional. [place] [address] any warranty or liability for your use of this information. Heart Failure: Care Instructions  Your Care Instructions    Heart failure occurs when your heart does not pump as much blood as the body needs. Failure does not mean that the heart has stopped pumping but rather that it is not pumping as well as it should. Over time, this causes fluid buildup in your lungs and other parts of your body. Fluid buildup can cause shortness of breath, fatigue, swollen ankles, and other problems. By taking medicines regularly, reducing sodium (salt) in your diet, checking your weight every day, and making lifestyle changes, you can feel better and live longer. Follow-up care is a key part of your treatment and safety. Be sure to make and go to all appointments, and call your doctor if you are having problems.  It's also a good idea to know your test results and keep a list of the medicines you take. How can you care for yourself at home? Medicines    · Be safe with medicines. Take your medicines exactly as prescribed. Call your doctor if you think you are having a problem with your medicine.     · Do not take any vitamins, over-the-counter medicine, or herbal products without talking to your doctor first. Reagan Miranda not take ibuprofen (Advil or Motrin) and naproxen (Aleve) without talking to your doctor first. They could make your heart failure worse.     · You may be taking some of the following medicine. ? Beta-blockers can slow heart rate, decrease blood pressure, and improve your condition. Taking a beta-blocker may lower your chance of needing to be hospitalized. ? Angiotensin-converting enzyme inhibitors (ACEIs) reduce the heart's workload, lower blood pressure, and reduce swelling. Taking an ACEI may lower your chance of needing to be hospitalized again. ? Angiotensin II receptor blockers (ARBs) work like ACEIs. Your doctor may prescribe them instead of ACEIs. ? Diuretics, also called water pills, reduce swelling. ? Potassium supplements replace this important mineral, which is sometimes lost with diuretics. ? Aspirin and other blood thinners prevent blood clots, which can cause a stroke or heart attack.    You will get more details on the specific medicines your doctor prescribes. Diet    · Your doctor may suggest that you limit sodium to 2,000 milligrams (mg) a day or less. That is less than 1 teaspoon of salt a day, including all the salt you eat in cooking or in packaged foods. People get most of their sodium from processed foods. Fast food and restaurant meals also tend to be very high in sodium.     · Ask your doctor how much liquid you can drink each day. You may have to limit liquids.    Weight    · Weigh yourself without clothing at the same time each day. Record your weight.  Call your doctor if you have a sudden weight gain, such as more than 2 to 3 pounds in a day or 5 pounds in a week. (Your doctor may suggest a different range of weight gain.) A sudden weight gain may mean that your heart failure is getting worse.    Activity level    · Start light exercise (if your doctor says it is okay). Even if you can only do a small amount, exercise will help you get stronger, have more energy, and manage your weight and your stress. Walking is an easy way to get exercise. Start out by walking a little more than you did before. Bit by bit, increase the amount you walk.     · When you exercise, watch for signs that your heart is working too hard. You are pushing yourself too hard if you cannot talk while you are exercising. If you become short of breath or dizzy or have chest pain, stop, sit down, and rest.     · If you feel \"wiped out\" the day after you exercise, walk slower or for a shorter distance until you can work up to a better pace.     · Get enough rest at night. Sleeping with 1 or 2 pillows under your upper body and head may help you breathe easier.    Lifestyle changes    · Do not smoke. Smoking can make a heart condition worse. If you need help quitting, talk to your doctor about stop-smoking programs and medicines. These can increase your chances of quitting for good. Quitting smoking may be the most important step you can take to protect your heart.     · Limit alcohol to 2 drinks a day for men and 1 drink a day for women. Too much alcohol can cause health problems.     · Avoid getting sick from colds and the flu. Get a pneumococcal vaccine shot. If you have had one before, ask your doctor whether you need another dose. Get a flu shot each year. If you must be around people with colds or the flu, wash your hands often. When should you call for help?   Call 911 if you have symptoms of sudden heart failure such as:    · You have severe trouble breathing.     · You cough up pink, foamy mucus.     · You have a new irregular or rapid heartbeat.    Call your doctor now or seek immediate medical care if:    · You have new or increased shortness of breath.     · You are dizzy or lightheaded, or you feel like you may faint.     · You have sudden weight gain, such as more than 2 to 3 pounds in a day or 5 pounds in a week. (Your doctor may suggest a different range of weight gain.)     · You have increased swelling in your legs, ankles, or feet.     · You are suddenly so tired or weak that you cannot do your usual activities.    Watch closely for changes in your health, and be sure to contact your doctor if you develop new symptoms. Where can you learn more? Go to http://clinton-avelino.info/. Enter F025 in the search box to learn more about \"Heart Failure: Care Instructions. \"  Current as of: July 22, 2018  Content Version: 11.9  © 4927-3444 [employer]. Care instructions adapted under license by [employer] (which disclaims liability or warranty for this information). If you have questions about a medical condition or this instruction, always ask your healthcare professional. [address] any warranty or liability for your use of this information. Learning About a Pacemaker for Heart Failure  What is a pacemaker for heart failure? A pacemaker is a small device that is placed under the skin of your chest. It is powered by batteries. It has thin wires, called leads, that pass through a vein into your heart. A pacemaker for heart failure is a biventricular pacemaker (say \"by-jesse-TRICK-yuh-ler\"). Treatment that uses this type of pacemaker is called cardiac resynchronization therapy (CRT). When you have heart failure, the lower chambers of your heart may not pump at the same time. The pacemaker sends painless electrical signals to your heart. These signals make the chambers pump at the same time. This can help your heart pump blood better and help you feel better.   Your pacemaker may be combined with an ICD, or implantable cardioverter-defibrillator. It can control abnormal heart rhythms. This can prevent sudden death. You may feel worried about having a pacemaker. This is common. It can help if you learn about how the pacemaker helps your heart. Talk to your doctor about your concerns. How is a pacemaker put in place? You will get medicine before the procedure. This helps you relax and helps prevent pain. The doctor makes a cut in the skin just below your collarbone. The cut may be on either side of your chest. The doctor will put the pacemaker leads through the cut. The leads go into a large blood vessel in the upper chest. Then the doctor will guide the leads through the blood vessel into different chambers of the heart. The doctor will place the pacemaker under the skin of your chest. He or she will attach the leads to the pacemaker. Then the cut will be closed with stitches. The procedure usually takes 2 to 3 hours. You may need to spend the night in the hospital.  What can you expect when you have a pacemaker? A pacemaker can help your heart pump blood better. It may help you feel better so you can be more active. It also may help keep you out of the hospital and help you live longer. You can live a normal, active life with a pacemaker. But you need to avoid strong magnetic and electrical fields. Your doctor or the maker of your pacemaker can give you a full list of things to avoid. But most everyday appliances are safe. Your doctor will check your pacemaker regularly to make sure it's working right. Pacemaker batteries usually last 5 to 15 years before they need to be replaced. Follow-up care is a key part of your treatment and safety. Be sure to make and go to all appointments, and call your doctor if you are having problems. It's also a good idea to know your test results and keep a list of the medicines you take. Where can you learn more?   Go to http://clinton-avelino.info/. Enter Y169 in the search box to learn more about \"Learning About a Pacemaker for Heart Failure. \"  Current as of: July 22, 2018  Content Version: 11.9  © 0705-3882 [employer]. Care instructions adapted under license by [employer] (which disclaims liability or warranty for this information). If you have questions about a medical condition or this instruction, always ask your healthcare professional. [address] any warranty or liability for your use of this information. DISCHARGE SUMMARY from Nurse    PATIENT INSTRUCTIONS:    After general anesthesia or intravenous sedation, for 24 hours or while taking prescription Narcotics:  · Limit your activities  · Do not drive and operate hazardous machinery  · Do not make important personal or business decisions  · Do  not drink alcoholic beverages  · If you have not urinated within 8 hours after discharge, please contact your surgeon on call. Report the following to your surgeon:  · Excessive pain, swelling, redness or odor of or around the surgical area  · Temperature over 100.5  · Nausea and vomiting lasting longer than 4 hours or if unable to take medications  · Any signs of decreased circulation or nerve impairment to extremity: change in color, persistent  numbness, tingling, coldness or increase pain  · Any questions    What to do at Home:  Recommended activity: Activity as tolerated    If you experience any of the following symptoms chest pain, shortness of breath, weight gain of 3 pounds overnight or 5 pounds in a week please follow up with [facility]. *  Please give a list of your current medications to your Primary Care Provider. *  Please update this list whenever your medications are discontinued, doses are      changed, or new medications (including over-the-counter products) are added.     *  Please carry medication information at all times in case of emergency situations. These are general instructions for a healthy lifestyle:    No smoking/ No tobacco products/ Avoid exposure to second hand smoke  Surgeon General's Warning:  Quitting smoking now greatly reduces serious risk to your health. Obesity, smoking, and sedentary lifestyle greatly increases your risk for illness    A healthy diet, regular physical exercise & weight monitoring are important for maintaining a healthy lifestyle    You may be retaining fluid if you have a history of heart failure or if you experience any of the following symptoms:  Weight gain of 3 pounds or more overnight or 5 pounds in a week, increased swelling in our hands or feet or shortness of breath while lying flat in bed. Please call your doctor as soon as you notice any of these symptoms; do not wait until your next office visit. The discharge information has been reviewed with the [facility]. The [facility] verbalized understanding. Discharge medications reviewed with the [facility] and appropriate educational materials and side effects teaching were provided.   ___________________________________________________________________________________________________________________________________

## 2019-07-19 NOTE — PROGRESS NOTES
[facility] CARDIOLOGY PROGRESS NOTE           7/19/2019 6:18 AM    Admit Date: 7/13/2019    Admit Diagnosis: Bradycardia [R00.1]      Subjective:   No complaints this AM, no chest pain or shortness of breath    Interval History: (History of pertinent interval events obtained from nursing staff)    ROS:  GEN:  No fever or chills  Cardiovascular:  As noted above  Pulmonary:  As noted above  Neuro:  No new focal motor or sensory loss      Objective:     Vitals:    07/18/19 1629 07/18/19 2130 07/19/19 0112 07/19/19 0441   BP: 111/61 109/51 110/54 117/60   Pulse: 69 72 65 73   Resp: 17 16 17 18   Temp: 98 °F (36.7 °C) 98.7 °F (37.1 °C) 98.8 °F (37.1 °C) 98.1 °F (36.7 °C)   SpO2: 97% 95% 92% 93%   Weight:    64 kg (141 lb 3.2 oz)   Height:    5' 4\" (1.626 m)       Physical Exam:  [facility], Well Nourished, No Acute Distress, Alert & Oriented x 3, appropriate mood. Neck- supple, no JVD  CV- regular rate and rhythm no MRG  Lung- clear bilaterally  Abd- soft, nontender, nondistended  Ext- no edema bilaterally.   Skin- warm and dry    Current Facility-Administered Medications   Medication Dose Route Frequency    lisinopril (PRINIVIL, ZESTRIL) tablet 2.5 mg  2.5 mg Oral DAILY    carvedilol (COREG) tablet 3.125 mg  3.125 mg Oral BID WITH MEALS    doxazosin (CARDURA) tablet 4 mg  4 mg Oral DAILY    finasteride (PROSCAR) tablet 5 mg  5 mg Oral DAILY    sodium chloride (NS) flush 5-40 mL  5-40 mL IntraVENous Q8H    sodium chloride (NS) flush 5-40 mL  5-40 mL IntraVENous PRN    acetaminophen (TYLENOL) tablet 650 mg  650 mg Oral Q4H PRN    HYDROcodone-acetaminophen (NORCO) 5-325 mg per tablet 1 Tab  1 Tab Oral Q4H PRN    ondansetron (ZOFRAN) injection 4 mg  4 mg IntraVENous Q4H PRN    docusate sodium (COLACE) capsule 100 mg  100 mg Oral BID PRN    warfarin (COUMADIN) tablet 5 mg  5 mg Oral QPM    simethicone (MYLICON) tablet 80 mg  80 mg Oral QID PRN    isosorbide mononitrate ER (IMDUR) tablet 30 mg  30 mg Oral DAILY    simvastatin (ZOCOR) tablet 20 mg  20 mg Oral QHS    nitroglycerin (NITROSTAT) tablet 0.4 mg  0.4 mg SubLINGual Q5MIN PRN    morphine injection 2 mg  2 mg IntraVENous Q4H PRN    naloxone (NARCAN) injection 0.4 mg  0.4 mg IntraVENous PRN    alum-mag hydroxide-simeth (MYLANTA) oral suspension 30 mL  30 mL Oral Q4H PRN    diphenhydrAMINE (BENADRYL) capsule 50 mg  50 mg Oral QHS PRN    furosemide (LASIX) injection 40 mg  40 mg IntraVENous DAILY    guaiFENesin (ROBITUSSIN) 100 mg/5 mL oral liquid 200 mg  200 mg Oral Q6H PRN     Data Review:   Recent Results (from the past 24 hour(s))   PLEASE READ & DOCUMENT PPD TEST IN 48 HRS    Collection Time: 07/18/19  1:33 PM   Result Value Ref Range    PPD Negative Negative    mm Induration 0 0 - 5 mm       EKG:  (EKG has been independently visualized by me with interpretation below)  Assessment:     Principal Problem:    Symptomatic bradycardia (7/13/2019)    Active Problems:    Essential hypertension (1/9/2014)      Coronary atherosclerosis of native coronary vessel (7/26/2012)      HLD (hyperlipidemia) (7/26/2012)      Second-degree heart block; MOBITZ I; 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 Cardiac device implantation: Pt device interrogation post implant reveals normal function, excellent pacing and sensing parameters, CXR without pneumothorax with excellent device position, no recognized complications. 2. CHF: cont OMT, euvolemic today  3. Mechanical AV: on warfarin, INR 2.1 yesterday  4. Hypotension: resolved  5. Dispo - today to [facility]    Cosmo Lowery MD  Cardiology/Electrophysiology

## 2019-07-19 NOTE — PROGRESS NOTES
Bedside and Verbal shift change report given to [facility], RN (oncoming nurse) by self Guillermina cid). Report included the following information SBAR, Kardex, MAR and Recent Results.

## 2019-07-19 NOTE — PROGRESS NOTES
Problem: Falls - Risk of  Goal: *Absence of Falls  Description  Document Roberta Khanna Fall Risk and appropriate interventions in the flowsheet.    Outcome: Progressing Towards Goal  Note:   Fall Risk Interventions:  Mobility Interventions: Bed/chair exit alarm, Communicate number of staff needed for ambulation/transfer, PT Consult for mobility concerns, PT Consult for assist device competence         Medication Interventions: Bed/chair exit alarm, Patient to call before getting OOB, Teach patient to arise slowly    Elimination Interventions: Bed/chair exit alarm, Call light in reach, Urinal in reach    History of Falls Interventions: Bed/chair exit alarm

## 2019-07-22 ENCOUNTER — PATIENT OUTREACH (OUTPATIENT)
Dept: CASE MANAGEMENT | Age: 84
End: 2019-07-22

## 2019-07-30 ENCOUNTER — HOSPITAL ENCOUNTER (INPATIENT)
Age: 84
LOS: 4 days | Discharge: NURSING, REHAB OR LONG-TERM CARE | DRG: 261 | End: 2019-08-05
Attending: EMERGENCY MEDICINE | Admitting: INTERNAL MEDICINE
Payer: MEDICARE

## 2019-07-30 ENCOUNTER — APPOINTMENT (OUTPATIENT)
Dept: GENERAL RADIOLOGY | Age: 84
DRG: 261 | End: 2019-07-30
Attending: EMERGENCY MEDICINE
Payer: MEDICARE

## 2019-07-30 DIAGNOSIS — I44.2 COMPLETE HEART BLOCK (HCC): Primary | ICD-10-CM

## 2019-07-30 DIAGNOSIS — T82.110A AICD LEAD MALFUNCTION: ICD-10-CM

## 2019-07-30 PROBLEM — H91.90 DEAF: Status: ACTIVE | Noted: 2019-07-30

## 2019-07-30 PROBLEM — T82.111A PACEMAKER MALFUNCTION: Status: ACTIVE | Noted: 2019-07-30

## 2019-07-30 PROBLEM — Z95.0 BIVENTRICULAR CARDIAC PACEMAKER IN SITU: Status: ACTIVE | Noted: 2019-07-30

## 2019-07-30 LAB
ALBUMIN SERPL-MCNC: 2.8 G/DL (ref 3.2–4.6)
ALBUMIN/GLOB SERPL: 0.7 {RATIO} (ref 1.2–3.5)
ALP SERPL-CCNC: 181 U/L (ref 50–136)
ALT SERPL-CCNC: 36 U/L (ref 12–65)
ANION GAP SERPL CALC-SCNC: 5 MMOL/L (ref 7–16)
APPEARANCE UR: ABNORMAL
AST SERPL-CCNC: 75 U/L (ref 15–37)
BACTERIA URNS QL MICRO: NORMAL /HPF
BASOPHILS # BLD: 0 K/UL (ref 0–0.2)
BASOPHILS NFR BLD: 0 % (ref 0–2)
BILIRUB SERPL-MCNC: 0.6 MG/DL (ref 0.2–1.1)
BILIRUB UR QL: NEGATIVE
BUN SERPL-MCNC: 21 MG/DL (ref 8–23)
CALCIUM SERPL-MCNC: 8.3 MG/DL (ref 8.3–10.4)
CASTS URNS QL MICRO: 0 /LPF
CHLORIDE SERPL-SCNC: 100 MMOL/L (ref 98–107)
CO2 SERPL-SCNC: 30 MMOL/L (ref 21–32)
COLOR UR: YELLOW
CREAT SERPL-MCNC: 0.89 MG/DL (ref 0.8–1.5)
CRYSTALS URNS QL MICRO: 0 /LPF
DIFFERENTIAL METHOD BLD: ABNORMAL
EOSINOPHIL # BLD: 0.2 K/UL (ref 0–0.8)
EOSINOPHIL NFR BLD: 3 % (ref 0.5–7.8)
EPI CELLS #/AREA URNS HPF: 0 /HPF
ERYTHROCYTE [DISTWIDTH] IN BLOOD BY AUTOMATED COUNT: 14.6 % (ref 11.9–14.6)
GLOBULIN SER CALC-MCNC: 4.1 G/DL (ref 2.3–3.5)
GLUCOSE SERPL-MCNC: 86 MG/DL (ref 65–100)
GLUCOSE UR STRIP.AUTO-MCNC: NEGATIVE MG/DL
HCT VFR BLD AUTO: 38.4 % (ref 41.1–50.3)
HGB BLD-MCNC: 13 G/DL (ref 13.6–17.2)
HGB UR QL STRIP: ABNORMAL
IMM GRANULOCYTES # BLD AUTO: 0 K/UL (ref 0–0.5)
IMM GRANULOCYTES NFR BLD AUTO: 0 % (ref 0–5)
KETONES UR QL STRIP.AUTO: NEGATIVE MG/DL
LEUKOCYTE ESTERASE UR QL STRIP.AUTO: ABNORMAL
LYMPHOCYTES # BLD: 0.9 K/UL (ref 0.5–4.6)
LYMPHOCYTES NFR BLD: 16 % (ref 13–44)
MAGNESIUM SERPL-MCNC: 1.9 MG/DL (ref 1.8–2.4)
MCH RBC QN AUTO: 30.2 PG (ref 26.1–32.9)
MCHC RBC AUTO-ENTMCNC: 33.9 G/DL (ref 31.4–35)
MCV RBC AUTO: 89.3 FL (ref 79.6–97.8)
MONOCYTES # BLD: 0.7 K/UL (ref 0.1–1.3)
MONOCYTES NFR BLD: 13 % (ref 4–12)
MUCOUS THREADS URNS QL MICRO: 0 /LPF
NEUTS SEG # BLD: 3.8 K/UL (ref 1.7–8.2)
NEUTS SEG NFR BLD: 67 % (ref 43–78)
NITRITE UR QL STRIP.AUTO: NEGATIVE
NRBC # BLD: 0 K/UL (ref 0–0.2)
OTHER OBSERVATIONS,UCOM: NORMAL
PH UR STRIP: 7 [PH] (ref 5–9)
PLATELET # BLD AUTO: 214 K/UL (ref 150–450)
PMV BLD AUTO: 10.3 FL (ref 9.4–12.3)
POTASSIUM SERPL-SCNC: 4 MMOL/L (ref 3.5–5.1)
PROT SERPL-MCNC: 6.9 G/DL (ref 6.3–8.2)
PROT UR STRIP-MCNC: NEGATIVE MG/DL
RBC # BLD AUTO: 4.3 M/UL (ref 4.23–5.6)
RBC #/AREA URNS HPF: NORMAL /HPF
SODIUM SERPL-SCNC: 135 MMOL/L (ref 136–145)
SP GR UR REFRACTOMETRY: 1.01 (ref 1–1.02)
TROPONIN I BLD-MCNC: 0.01 NG/ML (ref 0.02–0.05)
TROPONIN I SERPL-MCNC: 1.13 NG/ML (ref 0.02–0.05)
UROBILINOGEN UR QL STRIP.AUTO: 1 EU/DL (ref 0.2–1)
WBC # BLD AUTO: 5.6 K/UL (ref 4.3–11.1)
WBC URNS QL MICRO: NORMAL /HPF

## 2019-07-30 PROCEDURE — 93005 ELECTROCARDIOGRAM TRACING: CPT | Performed by: EMERGENCY MEDICINE

## 2019-07-30 PROCEDURE — 36415 COLL VENOUS BLD VENIPUNCTURE: CPT

## 2019-07-30 PROCEDURE — 85025 COMPLETE CBC W/AUTO DIFF WBC: CPT

## 2019-07-30 PROCEDURE — 99218 HC RM OBSERVATION: CPT

## 2019-07-30 PROCEDURE — 80053 COMPREHEN METABOLIC PANEL: CPT

## 2019-07-30 PROCEDURE — 74011250637 HC RX REV CODE- 250/637: Performed by: NURSE PRACTITIONER

## 2019-07-30 PROCEDURE — 81015 MICROSCOPIC EXAM OF URINE: CPT

## 2019-07-30 PROCEDURE — 87086 URINE CULTURE/COLONY COUNT: CPT

## 2019-07-30 PROCEDURE — 74011250636 HC RX REV CODE- 250/636: Performed by: NURSE PRACTITIONER

## 2019-07-30 PROCEDURE — 77030019605

## 2019-07-30 PROCEDURE — 74011000258 HC RX REV CODE- 258: Performed by: NURSE PRACTITIONER

## 2019-07-30 PROCEDURE — 81003 URINALYSIS AUTO W/O SCOPE: CPT

## 2019-07-30 PROCEDURE — 83735 ASSAY OF MAGNESIUM: CPT

## 2019-07-30 PROCEDURE — A9270 NON-COVERED ITEM OR SERVICE: HCPCS | Performed by: INTERNAL MEDICINE

## 2019-07-30 PROCEDURE — 84484 ASSAY OF TROPONIN QUANT: CPT

## 2019-07-30 PROCEDURE — 71045 X-RAY EXAM CHEST 1 VIEW: CPT

## 2019-07-30 PROCEDURE — 99285 EMERGENCY DEPT VISIT HI MDM: CPT | Performed by: EMERGENCY MEDICINE

## 2019-07-30 PROCEDURE — 77030021907 HC KT URIN FOL O&M -A

## 2019-07-30 RX ORDER — SODIUM CHLORIDE 0.9 % (FLUSH) 0.9 %
5-40 SYRINGE (ML) INJECTION EVERY 8 HOURS
Status: DISCONTINUED | OUTPATIENT
Start: 2019-07-30 | End: 2019-08-05 | Stop reason: HOSPADM

## 2019-07-30 RX ORDER — DOCUSATE SODIUM 100 MG/1
100 CAPSULE, LIQUID FILLED ORAL
Status: DISCONTINUED | OUTPATIENT
Start: 2019-07-30 | End: 2019-08-03 | Stop reason: SDUPTHER

## 2019-07-30 RX ORDER — LISINOPRIL 5 MG/1
2.5 TABLET ORAL DAILY
Status: DISCONTINUED | OUTPATIENT
Start: 2019-07-31 | End: 2019-08-05 | Stop reason: HOSPADM

## 2019-07-30 RX ORDER — MORPHINE SULFATE 2 MG/ML
2 INJECTION, SOLUTION INTRAMUSCULAR; INTRAVENOUS
Status: DISCONTINUED | OUTPATIENT
Start: 2019-07-30 | End: 2019-08-05 | Stop reason: HOSPADM

## 2019-07-30 RX ORDER — SODIUM CHLORIDE 0.9 % (FLUSH) 0.9 %
5-40 SYRINGE (ML) INJECTION AS NEEDED
Status: DISCONTINUED | OUTPATIENT
Start: 2019-07-30 | End: 2019-08-05 | Stop reason: HOSPADM

## 2019-07-30 RX ORDER — MECLIZINE HCL 12.5 MG 12.5 MG/1
25 TABLET ORAL
Status: DISCONTINUED | OUTPATIENT
Start: 2019-07-30 | End: 2019-08-05 | Stop reason: HOSPADM

## 2019-07-30 RX ORDER — DOXAZOSIN 4 MG/1
4 TABLET ORAL DAILY
Status: DISCONTINUED | OUTPATIENT
Start: 2019-07-31 | End: 2019-08-05 | Stop reason: HOSPADM

## 2019-07-30 RX ORDER — SIMVASTATIN 20 MG/1
20 TABLET, FILM COATED ORAL
Status: DISCONTINUED | OUTPATIENT
Start: 2019-07-30 | End: 2019-08-05 | Stop reason: HOSPADM

## 2019-07-30 RX ORDER — ISOSORBIDE MONONITRATE 30 MG/1
30 TABLET, EXTENDED RELEASE ORAL DAILY
Status: DISCONTINUED | OUTPATIENT
Start: 2019-07-31 | End: 2019-08-05 | Stop reason: HOSPADM

## 2019-07-30 RX ORDER — FINASTERIDE 5 MG/1
5 TABLET, FILM COATED ORAL DAILY
Status: DISCONTINUED | OUTPATIENT
Start: 2019-07-31 | End: 2019-08-05 | Stop reason: HOSPADM

## 2019-07-30 RX ORDER — FUROSEMIDE 20 MG/1
20 TABLET ORAL 2 TIMES DAILY
Status: DISCONTINUED | OUTPATIENT
Start: 2019-07-30 | End: 2019-08-05 | Stop reason: HOSPADM

## 2019-07-30 RX ORDER — CARVEDILOL 3.12 MG/1
3.12 TABLET ORAL 2 TIMES DAILY WITH MEALS
Status: DISCONTINUED | OUTPATIENT
Start: 2019-07-30 | End: 2019-08-05 | Stop reason: HOSPADM

## 2019-07-30 RX ADMIN — SIMVASTATIN 20 MG: 20 TABLET, FILM COATED ORAL at 21:46

## 2019-07-30 RX ADMIN — Medication 10 ML: at 17:36

## 2019-07-30 RX ADMIN — FUROSEMIDE 20 MG: 20 TABLET ORAL at 17:35

## 2019-07-30 RX ADMIN — CARVEDILOL 3.12 MG: 3.12 TABLET, FILM COATED ORAL at 17:36

## 2019-07-30 RX ADMIN — Medication 10 ML: at 21:46

## 2019-07-30 RX ADMIN — SODIUM CHLORIDE 1000 MG: 900 INJECTION, SOLUTION INTRAVENOUS at 17:36

## 2019-07-30 NOTE — ED PROVIDER NOTES
Pt was recently admitted with Sick Sinus Syndrome. He had [employer]ot[employer] BiV PPM implantation at [facility] on 7/15/2019 by Dr. Kandice Castillo. Patient history is very difficult to obtain as he does mostly lipreading. He reports that he believes he was shocked last night and has felt bad since. Past Medical History:   Diagnosis Date    Abnormal EKG 2014    Coronary atherosclerosis of native coronary vessel 2012    Essential hypertension 2014    H/O prosthetic aortic valve replacement     HLD (hyperlipidemia) 2012    Malignant neoplasm of prostate ([address] 2012    PND (paroxysmal nocturnal dyspnea) 2017    S/P AVR 2016    Second-degree heart block; MOBITZ I; WENCKEB 2016    Slow pulse     Valvular heart disease        Past Surgical History:   Procedure Laterality Date    HX AORTIC VALVE REPLACEMENT  1990    at [facility]      HX HEART VALVE SURGERY           No family history on file.     Social History     Socioeconomic History    Marital status:      Spouse name: Not on file    Number of children: Not on file    Years of education: Not on file    Highest education level: Not on file   Occupational History    Not on file   Social Needs    Financial resource strain: Not on file    Food insecurity:     Worry: Not on file     Inability: Not on file    Transportation needs:     Medical: Not on file     Non-medical: Not on file   Tobacco Use    Smoking status: Former Smoker     Packs/day: 1.00     Years: 15.00     Pack years: 15.00     Last attempt to quit: 1988     Years since quittin.0    Smokeless tobacco: Never Used    Tobacco comment: pack every 3-4 days   Substance and Sexual Activity    Alcohol use: No    Drug use: Not on file    Sexual activity: Not on file   Lifestyle    Physical activity:     Days per week: Not on file     Minutes per session: Not on file    Stress: Not on file   Relationships    Social connections:     Talks on phone: Not on file     Gets together: Not on file     Attends [employer] service: Not on file     Active member of club or organization: Not on file     Attends meetings of clubs or organizations: Not on file     Relationship status: Not on file    Intimate partner violence:     Fear of current or ex partner: Not on file     Emotionally abused: Not on file     Physically abused: Not on file     Forced sexual activity: Not on file   Other Topics Concern    Not on file   Social History Narrative    Not on file         ALLERGIES: Procaine    Review of Systems   Unable to perform ROS: Other (Difficulty understanding patients speech.  )       Vitals:    07/30/19 1205   BP: 123/57   Pulse: 88   Resp: 16   SpO2: 97%   Weight: 64 kg (141 lb)   Height: 5' 4\" (1.626 m)            Physical Exam   Constitutional: He is oriented to person, place, and time. He appears well-developed and well-nourished. No distress. HENT:   Head: Normocephalic and atraumatic. Eyes: Pupils are equal, round, and reactive to light. Conjunctivae and EOM are normal. No scleral icterus. Neck: Normal range of motion. Neck supple. No tracheal deviation present. Pulmonary/Chest: Effort normal. No stridor. No respiratory distress. He has no wheezes. He has no rales. Abdominal: Soft. He exhibits no mass. There is no tenderness. There is no rebound and no guarding. Musculoskeletal: Normal range of motion. He exhibits no edema or tenderness. Neurological: He is alert and oriented to person, place, and time. No cranial nerve deficit. Skin: Skin is warm and dry. Capillary refill takes less than 2 seconds. He is not diaphoretic. No erythema. No pallor. Psychiatric: He has a normal mood and affect. His behavior is normal.   Nursing note and vitals reviewed.        MDM  Number of Diagnoses or Management Options  AICD lead malfunction:   Complete heart block [facility]):   Diagnosis management comments: I called [employer] in and they have interrogated pacemaker it appears the right ventricular lead has migrated atrium and the left ventricular lead is intermittently capturing. There is an underlying third-degree block. At the moment is currently capturing. I have called cardiology for further care. Sheri Dempsey MD; 7/30/2019 @1:19 PM Voice dictation software was used during the making of this note. This software is not perfect and grammatical and other typographical errors may be present.   This note has not been proofread for errors.  ===================================================================        Amount and/or Complexity of Data Reviewed  Clinical lab tests: ordered and reviewed (Results for orders placed or performed during the hospital encounter of 07/30/19  -CBC WITH AUTOMATED DIFF       Result                      Value             Ref Range           WBC                         5.6               4.3 - 11.1 K*       RBC                         4.30              4.23 - 5.6 M*       HGB                         13.0 (L)          13.6 - 17.2 *       HCT                         38.4 (L)          41.1 - 50.3 %       MCV                         89.3              79.6 - 97.8 *       MCH                         30.2              26.1 - 32.9 *       MCHC                        33.9              31.4 - 35.0 *       RDW                         14.6              11.9 - 14.6 %       PLATELET                    214               150 - 450 K/*       MPV                         10.3              9.4 - 12.3 FL       ABSOLUTE NRBC               0.00              0.0 - 0.2 K/*       DF                          AUTOMATED                             NEUTROPHILS                 67                43 - 78 %           LYMPHOCYTES                 16                13 - 44 %           MONOCYTES                   13 (H)            4.0 - 12.0 %        EOSINOPHILS                 3                 0.5 - 7.8 %         BASOPHILS 0                 0.0 - 2.0 %         IMMATURE GRANULOCYTES       0                 0.0 - 5.0 %         ABS. NEUTROPHILS            3.8               1.7 - 8.2 K/*       ABS. LYMPHOCYTES            0.9               0.5 - 4.6 K/*       ABS. MONOCYTES              0.7               0.1 - 1.3 K/*       ABS. EOSINOPHILS            0.2               0.0 - 0.8 K/*       ABS. BASOPHILS              0.0               0.0 - 0.2 K/*       ABS. IMM. GRANS.            0.0               0.0 - 0.5 K/*  -METABOLIC PANEL, COMPREHENSIVE       Result                      Value             Ref Range           Sodium                      135 (L)           136 - 145 mm*       Potassium                   4.0               3.5 - 5.1 mm*       Chloride                    100               98 - 107 mmo*       CO2                         30                21 - 32 mmol*       Anion gap                   5 (L)             7 - 16 mmol/L       Glucose                     86                65 - 100 mg/*       BUN                         21                8 - 23 MG/DL        Creatinine                  0.89              0.8 - 1.5 MG*       GFR est AA                  >60               >60 ml/min/1*       GFR est non-AA              >60               >60 ml/min/1*       Calcium                     8.3               8.3 - 10.4 M*       Bilirubin, total            0.6               0.2 - 1.1 MG*       ALT (SGPT)                  36                12 - 65 U/L         AST (SGOT)                  75 (H)            15 - 37 U/L         Alk.  phosphatase            181 (H)           50 - 136 U/L        Protein, total              6.9               6.3 - 8.2 g/*       Albumin                     2.8 (L)           3.2 - 4.6 g/*       Globulin                    4.1 (H)           2.3 - 3.5 g/*       A-G Ratio                   0.7 (L)           1.2 - 3.5      -MAGNESIUM       Result                      Value             Ref Range           Magnesium 1.9               1.8 - 2.4 mg*  -POC TROPONIN-I       Result                      Value             Ref Range           Troponin-I (POC)            0.01 (L)          0.02 - 0.05 * )  Tests in the radiology section of CPT®: reviewed and ordered (Xr Chest Port    Result Date: 7/30/2019  Portable chest: History: chest pain. Shortness of breath. Comparison: 07/16/2019 Findings: A single view of the chest was obtained at 12:38 PM. A dual-chamber cardiac pacer device is present. The cardiac silhouette is borderline enlarged. The lungs and pleural spaces are clear. The pulmonary vascularity is within normal limits. Median sternotomy wires are present.      Impression: Borderline enlarged cardiac silhouette with postoperative changes.    )  Review and summarize past medical records: yes  Discuss the patient with other providers: yes  Independent visualization of images, tracings, or specimens: yes           Procedures

## 2019-07-30 NOTE — PROGRESS NOTES
[facility] Patient arrived on unit and assessed. Patient complaining of pain around urinary tract. Grimes in place from last admission 7/13, stat lock was not properly secured. Noted some trauma to urethra, stat lock secured. Pacer site on L chest wall very bruised and swollen. Borders marked around edema, pt states tenderness upon palpation. Pt appears to be paced HR 60's on monitor. Paged Curt Walsh NP. NP Curt Walsh made aware of swelling and bruising at pacer site, no new orders at this time. NP aware of the possible erosion of hypospadius present around grimes insertion site. Received order to get UA and urine culture stat and to give ancef IV 1g q8hrs for 3 days. Also received order to consult urology, consult called. Called interpreting services, requested  to come to bedside to assist with helping obtain a clear history per cardiology request.     Dual skin assessment completed on admission. Heels intact. Sacrum purple area, non blanchable, shearing. Allevin placed CDI. L chest wall site s/p pacer with aquacel dressing bruised and swollen, Borders of swelling marked. Bruising covers L chest and axilla. Catheter insertion site red and erosion of hypospadius noted. No other impairments noted at this time.

## 2019-07-30 NOTE — PROGRESS NOTES
Initial visit to assess pt's spiritual needs, with help of  of ASL     Feeling today? Pt is frustrated to need surgery again    Receiving good care?  yes    Needs from Spiritual Care: none    Pt will have surgery tomorrow, but time of surgery is uncertain. Ministry of presence & prayer to demonstrate caring & concern, convey emotional & spiritual support.     bridgette Iglesias MDiv,[facility],PhD

## 2019-07-30 NOTE — CONSULTS
[address] Urology Consult Note                                           07/30/19     Patient: Anand Thorpe  MRN: [id number]    Admission Date:  7/30/2019, 0  Admission Diagnosis: Pacemaker malfunction [T82.111A]  Reason for Consult: Traumatic Hypospadias    ASSESSMENT: 82 y.o. male with a history of urinary retention requiring grimes catheter placement, now with traumatic hypospadias. Undergoing cardiac procedure tomorrow for pacemaker malfunction. Urology consulted for urinary retention and traumatic hypospadias. PLAN:  -No surgical intervention at this time. -Recommend grimes catheter removal tomorrow after procedure completed. -Bladder scan patient after voids and notify urology if scan > 300 cc. If < 300, cc leave grimes out and allow to continue spontaneous voids  -Continue doxazosin 4 mg every day and finasteride.  -Patient will need outpatient follow up with his Urologist at [facility] for traumatic hypospadias and urinary retention. He should call his primary urologist there to set this up.  -Will sign off. Call with questions. __________________________________________________________________________________    HPI:     Anand Thorpe is a 82 y.o. male with PMHx of AVR (s/p SJM mechanical 1990), CAD (s/p CABG), HTN, BPH and intermittent CHB/Mobitz II (previously asymptomatic and declined PPM) who was recently admitted on 7/12/19 in transfer from [address] and vomiting. BIV/ICD was placed and he was discharged home. He now returns and was admitted on 7/30/19 because his pacemaker was not functioning. Urology was consulted at the last admission for urinary retention. Grimes was placed 7/13/19 with high volume >1 L post void residual.  Of note, he is followed by Jocelyne Donato at [facility]) Urology. He was diagnosed with [address] 6 prostate cancer in '09 and is followed with watchful waiting, no treatment as of yet.   He also has bph and is regularly on 4 mg doxazosin daily. He also is on finasteride. CT 9/21/18 revealed a markedly enlarged prostate. He was discharged home with the grimes catheter which was removed a few days later at his rehab facility. His doxazosin was also increased from 2 to 4 mg daily. He returned without a grimes catheter but reported difficulty emptying today and grimes was replaced with about 300 cc output. Traumatic hypospadias was also noticed by RN and therefore urology was consulted. Of note, he is deaf and reads lips, but typically communicates by sign language. Past Medical History:  Past Medical History:   Diagnosis Date    Abnormal EKG 1/9/2014    Coronary atherosclerosis of native coronary vessel 7/26/2012    Essential hypertension 1/9/2014    H/O prosthetic aortic valve replacement     HLD (hyperlipidemia) 7/26/2012    Malignant neoplasm of prostate ([facility]) 7/26/2012    PND (paroxysmal nocturnal dyspnea) 5/1/2017    S/P AVR 11/1/2016    Second-degree heart block; MOBITZ I; WENCKEB 6/29/2016    Slow pulse     Valvular heart disease        Past Surgical History:  Past Surgical History:   Procedure Laterality Date    HX AORTIC VALVE REPLACEMENT  4/12/1990    at [facility] 60      HX HEART VALVE SURGERY         Medications:  No current facility-administered medications on file prior to encounter. Current Outpatient Medications on File Prior to Encounter   Medication Sig Dispense Refill    finasteride (PROSCAR) 5 mg tablet Take 1 Tab by mouth daily. 30 Tab 11    docusate sodium (COLACE) 100 mg capsule Take 1 Cap by mouth two (2) times daily as needed for Constipation for up to 90 days. 60 Cap 1    guaiFENesin (ROBITUSSIN) 100 mg/5 mL liquid Take 10 mL by mouth every six (6) hours as needed for Cough. 1 Bottle 0    carvedilol (COREG) 3.125 mg tablet Take 1 Tab by mouth two (2) times daily (with meals).  60 Tab 11    lisinopril (PRINIVIL, ZESTRIL) 2.5 mg tablet Take 1 Tab by mouth daily. 30 Tab 11    isosorbide mononitrate ER (IMDUR) 30 mg tablet Take 1 Tab by mouth daily. 90 Tab 3    simvastatin (ZOCOR) 20 mg tablet Take 1 Tab by mouth nightly. 90 Tab 3    furosemide (LASIX) 20 mg tablet Take 20 mg by mouth two (2) times a day.  meclizine (ANTIVERT) 25 mg tablet Take  by mouth three (3) times daily as needed.  warfarin (COUMADIN) 5 mg tablet Take 5 mg by mouth daily. Take as directed      doxazosin (CARDURA) 4 mg tablet Take 4 mg by mouth daily. 1/2 tab daily         Allergies:   Allergies   Allergen Reactions    Procaine Unknown (comments)       Social History:  Social History     Socioeconomic History    Marital status:      Spouse name: Not on file    Number of children: Not on file    Years of education: Not on file    Highest education level: Not on file   Occupational History    Not on file   Social Needs    Financial resource strain: Not on file    Food insecurity:     Worry: Not on file     Inability: Not on file    Transportation needs:     Medical: Not on file     Non-medical: Not on file   Tobacco Use    Smoking status: Former Smoker     Packs/day: 1.00     Years: 15.00     Pack years: 15.00     Last attempt to quit: 1988     Years since quittin.0    Smokeless tobacco: Never Used    Tobacco comment: pack every 3-4 days   Substance and Sexual Activity    Alcohol use: No    Drug use: Not on file    Sexual activity: Not on file   Lifestyle    Physical activity:     Days per week: Not on file     Minutes per session: Not on file    Stress: Not on file   Relationships    Social connections:     Talks on phone: Not on file     Gets together: Not on file     Attends [employer] service: Not on file     Active member of club or organization: Not on file     Attends meetings of clubs or organizations: Not on file     Relationship status: Not on file    Intimate partner violence:     Fear of current or ex partner: Not on file     Emotionally abused: Not on file     Physically abused: Not on file     Forced sexual activity: Not on file   Other Topics Concern    Not on file   Social History Narrative    Not on file       Family History:  No family history on file.     ROS:  Review of Systems - History obtained from the patient  Respiratory ROS: no cough, shortness of breath, or wheezing  Cardiovascular ROS: no chest pain or dyspnea on exertion  Gastrointestinal ROS: no abdominal pain, change in bowel habits, or black or bloody stools  Genito-Urinary ROS: positive for - change in urinary stream  negative for - hematuria, scrotal mass/pain or urinary frequency/urgency  Musculoskeletal ROS: negative  negative for - muscular weakness    Vitals:  Visit Vitals  /53   Pulse 68   Temp 98.1 °F (36.7 °C)   Resp 20   Ht 5' 4\" (1.626 m)   Wt 141 lb (64 kg)   SpO2 97%   BMI 24.20 kg/m²       Intake/Output:  No intake or output data in the 24 hours ending 07/30/19 1938     Physical Exam:   Visit Vitals  /53   Pulse 68   Temp 98.1 °F (36.7 °C)   Resp 20   Ht 5' 4\" (1.626 m)   Wt 141 lb (64 kg)   SpO2 97%   BMI 24.20 kg/m²        GENERAL: No acute distress, Awake, Alert, Oriented X 3  HEENT: Trachea midline, No gross visual impairments  CARDIAC: regular rate  CHEST AND LUNG: Easy work of breathing, clear to auscultation bilaterally, no cyanosis  ABDOMEN: soft, non tender, non-distended, positive bowel sounds, no organomegaly, no palpable masses, no guarding, no rebound tenderness  : Circumcised phallus with traumatic hypospadias abnormality, Catheter draining clear yellow urine Testicles descended in scrotum bilaterally and without palpable mass/nodule, non-tender, no edema, no skin erythema, vas deferens palpable bilaterally, no hydrocele, no inguinal hernias, no inguinal lymphadenopathy  SKIN: No rash, no erythema, no lacerations or abrasions, no ecchymosis  NEUROLOGIC: cranial nerves 2-12 grossly intact Lab/Radiology/Other Diagnostic Tests:  Recent Labs     07/30/19  1211   HGB 13.0*   HCT 38.4*   WBC 5.6   *   K 4.0      CO2 30   BUN 21   GLU 86   CA 8.3   MG 1.9   ALT 36     Brian Everett M.D.     [facility]  [place]  [address] [place] [address], [facility] [address]  Phone: [phone]  Fax: [phone]

## 2019-07-30 NOTE — H&P
[facility] H&P    Admitting Cardiologist:Dr. Sherry Ornelas    Primary Cardiologist:Dr. Lawrence/ Dr. Jonel Mccloud     Primary Care Physician:    Subjective:     Valdo Beck is a 82 y.o. male with known hx of mechanical AVR since 1990, transient CHB, NICM with recent upgrade of device to BIV PM. He presents to ER today with complaints of SOB and feeling poorly. S/p BIV PM on 7/15. Interrogation of device today reveals RV lead displaced and high LV threshold. 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 ITs a PM not ICD. Past Medical History:   Diagnosis Date    Abnormal EKG 1/9/2014    Coronary atherosclerosis of native coronary vessel 7/26/2012    Essential hypertension 1/9/2014    H/O prosthetic aortic valve replacement     HLD (hyperlipidemia) 7/26/2012    Malignant neoplasm of prostate ([facility] [address] 7/26/2012    PND (paroxysmal nocturnal dyspnea) 5/1/2017    S/P AVR 11/1/2016    Second-degree heart block; MOBITZ I; WENCKEB 6/29/2016    Slow pulse     Valvular heart disease       Past Surgical History:   Procedure Laterality Date    HX AORTIC VALVE REPLACEMENT  4/12/1990    at [facility]      HX HEART VALVE SURGERY        No current facility-administered medications for this encounter. Current Outpatient Medications   Medication Sig    finasteride (PROSCAR) 5 mg tablet Take 1 Tab by mouth daily.  docusate sodium (COLACE) 100 mg capsule Take 1 Cap by mouth two (2) times daily as needed for Constipation for up to 90 days.  guaiFENesin (ROBITUSSIN) 100 mg/5 mL liquid Take 10 mL by mouth every six (6) hours as needed for Cough.  carvedilol (COREG) 3.125 mg tablet Take 1 Tab by mouth two (2) times daily (with meals).  lisinopril (PRINIVIL, ZESTRIL) 2.5 mg tablet Take 1 Tab by mouth daily.  isosorbide mononitrate ER (IMDUR) 30 mg tablet Take 1 Tab by mouth daily.     simvastatin (ZOCOR) 20 mg tablet Take 1 Tab by mouth nightly.  furosemide (LASIX) 20 mg tablet Take 20 mg by mouth two (2) times a day.  meclizine (ANTIVERT) 25 mg tablet Take  by mouth three (3) times daily as needed.  warfarin (COUMADIN) 5 mg tablet Take 5 mg by mouth daily. Take as directed    doxazosin (CARDURA) 4 mg tablet Take 4 mg by mouth daily. 1/2 tab daily     Allergies   Allergen Reactions    Procaine Unknown (comments)      Social History     Tobacco Use    Smoking status: Former Smoker     Packs/day: 1.00     Years: 15.00     Pack years: 15.00     Last attempt to quit: 1988     Years since quittin.0    Smokeless tobacco: Never Used    Tobacco comment: pack every 3-4 days   Substance Use Topics    Alcohol use: No      No family history on file. Review of Systems  Gen: Denies fever, chills, malaise or fatigue. Appetite good. HEENT: Denies frequent headaches, dizzyness, visual disturbances, Neck pain or swallowing difficulty  Lungs: Denies shortness of breath, hx of COPD, breathing problems  Cardiovascular: Denies chest pain, orthopnea, PND, no syncope or near syncope  GI: Denies hememesis, dark tarry stools, No prior Hx of GI bleed, Denies constipation  : Denies dysuria, no complaints of frequency, nocturia  Heme: No prior bleeding disorders, no prior Cancer  Neuro: Denies prior CVA, TIA. Endocrine: no diabetes, thyroid disorders  Psychiatric: Denies anxiety, or other psychiatric illnesses. Objective:     Visit Vitals  /57 (BP 1 Location: Left arm)   Pulse 60   Resp 16   Ht 5' 4\" (1.626 m)   Wt 64 kg (141 lb)   SpO2 98%   BMI 24.20 kg/m²     General:Alert, cooperative, no distress, appears stated age  Head: Normocephalic, without obvious abnormality, atraumatic. Eyes: Conjunctivae/corneas clear. PERRL, EOMs intact  Nose:Nares normal. Septum midline. Mucosa normal. No drainage or sinus tenderness.    Throat: Lips, mucosa, and tongue normal. Teeth and gums normal.   Neck: Supple, symmetrical, trachea midline,  no carotid bruit and no JVD. Lungs:Clear to auscultation bilaterally. Chest wall: No tenderness or deformity. Heart: Regular rate and rhythm, S1, mech S2, no murmur, click, rub or gallop. Abdomen:Soft, non-tender. Bowel sounds normal. No masses, No organomegaly. Extremities: Extremities normal, atraumatic, no cyanosis or edema. Pulses: 2+ and symmetric all extremities. Skin: Skin color, texture, turgor normal. No rashes or lesions  Lymph nodes: Cervical, supraclavicular, and axillary nodes normal  Neurologic:No focal deficits identified            ECG: intermittent vent paced. Data Review:     Recent Results (from the past 24 hour(s))   CBC WITH AUTOMATED DIFF    Collection Time: 07/30/19 12:11 PM   Result Value Ref Range    WBC 5.6 4.3 - 11.1 K/uL    RBC 4.30 4.23 - 5.6 M/uL    HGB 13.0 (L) 13.6 - 17.2 g/dL    HCT 38.4 (L) 41.1 - 50.3 %    MCV 89.3 79.6 - 97.8 FL    MCH 30.2 26.1 - 32.9 PG    MCHC 33.9 31.4 - 35.0 g/dL    RDW 14.6 11.9 - 14.6 %    PLATELET 880 417 - 459 K/uL    MPV 10.3 9.4 - 12.3 FL    ABSOLUTE NRBC 0.00 0.0 - 0.2 K/uL    DF AUTOMATED      NEUTROPHILS 67 43 - 78 %    LYMPHOCYTES 16 13 - 44 %    MONOCYTES 13 (H) 4.0 - 12.0 %    EOSINOPHILS 3 0.5 - 7.8 %    BASOPHILS 0 0.0 - 2.0 %    IMMATURE GRANULOCYTES 0 0.0 - 5.0 %    ABS. NEUTROPHILS 3.8 1.7 - 8.2 K/UL    ABS. LYMPHOCYTES 0.9 0.5 - 4.6 K/UL    ABS. MONOCYTES 0.7 0.1 - 1.3 K/UL    ABS. EOSINOPHILS 0.2 0.0 - 0.8 K/UL    ABS. BASOPHILS 0.0 0.0 - 0.2 K/UL    ABS. IMM.  GRANS. 0.0 0.0 - 0.5 K/UL   METABOLIC PANEL, COMPREHENSIVE    Collection Time: 07/30/19 12:11 PM   Result Value Ref Range    Sodium 135 (L) 136 - 145 mmol/L    Potassium 4.0 3.5 - 5.1 mmol/L    Chloride 100 98 - 107 mmol/L    CO2 30 21 - 32 mmol/L    Anion gap 5 (L) 7 - 16 mmol/L    Glucose 86 65 - 100 mg/dL    BUN 21 8 - 23 MG/DL    Creatinine 0.89 0.8 - 1.5 MG/DL    GFR est AA >60 >60 ml/min/1.73m2    GFR est non-AA >60 >60 ml/min/1.73m2    Calcium 8.3 8.3 - 10.4 MG/DL    Bilirubin, total 0.6 0.2 - 1.1 MG/DL    ALT (SGPT) 36 12 - 65 U/L    AST (SGOT) 75 (H) 15 - 37 U/L    Alk. phosphatase 181 (H) 50 - 136 U/L    Protein, total 6.9 6.3 - 8.2 g/dL    Albumin 2.8 (L) 3.2 - 4.6 g/dL    Globulin 4.1 (H) 2.3 - 3.5 g/dL    A-G Ratio 0.7 (L) 1.2 - 3.5     MAGNESIUM    Collection Time: 07/30/19 12:11 PM   Result Value Ref Range    Magnesium 1.9 1.8 - 2.4 mg/dL   POC TROPONIN-I    Collection Time: 07/30/19 12:15 PM   Result Value Ref Range    Troponin-I (POC) 0.01 (L) 0.02 - 0.05 ng/ml         Assessment / Plan     Principal Problem:    Pacemaker lead malfunction (7/30/2019)--pt will need to be admitted for PM revision, hold coumadin. Repeat INR in am with hopeful revision tomorrow. Active Problems:    Essential hypertension (1/9/2014)--continue home meds. Complete heart block (HCC) (7/22/2016)      S/P AVR (11/1/2016)      Hx of CABG (7/12/2019)      Biventricular cardiac pacemaker in situ (7/30/2019)      Deaf (7/30/2019)    Vijay Cristobal NP     Attending Addendum    Patient independently seen and examined by me. Agree with above note by physician extender with the following additions and exceptions: 82 y.o. male with known hx of mechanical AVR since 1990, transient CHB, NICM with recent implantation of BIV PM admitted with dislodgement of RV pacing lead    Key findings are:  No CP or MCKEON  CV- RR, pace, mech S2  Lungs- Clear bilaterally  Ext- no edema    Plan: Plan for RV lead revision tomorrow, NPO, hold warfarin       --consult urology for recent grimes, retention, check UA       --further plan as noted above and pending clinical course    Sabas PEREZ.  [address] [facility]

## 2019-07-30 NOTE — ED TRIAGE NOTES
Pt arrived via ems from [place] [address] and rehab. Last night at midnight pt felt sob  nd felt a shock. The pain started in his chest but raidiated to his groin and his back. Defibulator was placed 1 month ago, pt states only having abdominal pain at this time. Pt fell back to sleep and woke up and called ems. En route patients BP dropped to 79/42 and HR increased to 90 but only lasted for a couple of minuets en route and pt fixed himself. Pt had moment of feeling drowusy en route. Pt alert and this time, read lips to communicate. 106/60 has been baseline BP, HR 66, 97.9 oral, BGL 96. 10 g L FA. Family has been made aware.

## 2019-07-30 NOTE — ED NOTES
TRANSFER - OUT REPORT:    Verbal report given to Liam Kelley rn (name) on Eulalio Nolen  being transferred to room 330 (unit) for routine progression of care       Report consisted of patients Situation, Background, Assessment and   Recommendations(SBAR). Information from the following report(s) ED Summary was reviewed with the receiving nurse. Lines:   Peripheral IV 07/30/19 Left Antecubital (Active)   Site Assessment Clean, dry, & intact 7/30/2019 12:16 PM   Phlebitis Assessment 0 7/30/2019 12:16 PM   Infiltration Assessment 0 7/30/2019 12:16 PM   Dressing Status Clean, dry, & intact 7/30/2019 12:16 PM   Dressing Type 4 X 4 7/30/2019 12:16 PM        Opportunity for questions and clarification was provided.       Patient transported with:   Monitor, alexx prince RN

## 2019-07-30 NOTE — PROGRESS NOTES
To request a  after 4:30 p.m., please call Pam Morton at [phone]. Thank you,      Yue Mariee, [address] [facility] /  [email or web address] c: [phone] / [address] / [place], [address] [facility]  [email or web address]

## 2019-07-31 LAB
ATRIAL RATE: 60 BPM
CALCULATED P AXIS, ECG09: 94 DEGREES
CALCULATED R AXIS, ECG10: -135 DEGREES
CALCULATED T AXIS, ECG11: -14 DEGREES
DIAGNOSIS, 93000: NORMAL
INR PPP: 2.6
P-R INTERVAL, ECG05: 168 MS
PROTHROMBIN TIME: 27.1 SEC (ref 11.7–14.5)
Q-T INTERVAL, ECG07: 534 MS
QRS DURATION, ECG06: 216 MS
QTC CALCULATION (BEZET), ECG08: 534 MS
VENTRICULAR RATE, ECG03: 60 BPM

## 2019-07-31 PROCEDURE — 77030020263 HC SOL INJ SOD CL0.9% LFCR 1000ML

## 2019-07-31 PROCEDURE — 74011250636 HC RX REV CODE- 250/636: Performed by: NURSE PRACTITIONER

## 2019-07-31 PROCEDURE — 85610 PROTHROMBIN TIME: CPT

## 2019-07-31 PROCEDURE — 74011000258 HC RX REV CODE- 258: Performed by: NURSE PRACTITIONER

## 2019-07-31 PROCEDURE — 74011250637 HC RX REV CODE- 250/637: Performed by: NURSE PRACTITIONER

## 2019-07-31 PROCEDURE — 99218 HC RM OBSERVATION: CPT

## 2019-07-31 PROCEDURE — 36415 COLL VENOUS BLD VENIPUNCTURE: CPT

## 2019-07-31 RX ADMIN — SODIUM CHLORIDE 150 ML: 900 INJECTION, SOLUTION INTRAVENOUS at 12:29

## 2019-07-31 RX ADMIN — SODIUM CHLORIDE 1000 MG: 900 INJECTION, SOLUTION INTRAVENOUS at 09:04

## 2019-07-31 RX ADMIN — DOXAZOSIN 4 MG: 4 TABLET ORAL at 09:05

## 2019-07-31 RX ADMIN — SODIUM CHLORIDE 150 ML: 900 INJECTION, SOLUTION INTRAVENOUS at 11:19

## 2019-07-31 RX ADMIN — LISINOPRIL 2.5 MG: 5 TABLET ORAL at 09:05

## 2019-07-31 RX ADMIN — SODIUM CHLORIDE 1000 MG: 900 INJECTION, SOLUTION INTRAVENOUS at 18:23

## 2019-07-31 RX ADMIN — Medication 10 ML: at 21:06

## 2019-07-31 RX ADMIN — SIMVASTATIN 20 MG: 20 TABLET, FILM COATED ORAL at 21:05

## 2019-07-31 RX ADMIN — SODIUM CHLORIDE 1000 MG: 900 INJECTION, SOLUTION INTRAVENOUS at 00:38

## 2019-07-31 RX ADMIN — Medication 10 ML: at 05:41

## 2019-07-31 RX ADMIN — ISOSORBIDE MONONITRATE 30 MG: 30 TABLET, EXTENDED RELEASE ORAL at 09:05

## 2019-07-31 RX ADMIN — FUROSEMIDE 20 MG: 20 TABLET ORAL at 09:06

## 2019-07-31 RX ADMIN — FINASTERIDE 5 MG: 5 TABLET, FILM COATED ORAL at 09:06

## 2019-07-31 RX ADMIN — Medication 10 ML: at 12:29

## 2019-07-31 RX ADMIN — CARVEDILOL 3.12 MG: 3.12 TABLET, FILM COATED ORAL at 09:05

## 2019-07-31 NOTE — PROGRESS NOTES
[facility] CARDIOLOGY PROGRESS NOTE           7/31/2019 6:57 AM    Admit Date: 7/30/2019    Admit Diagnosis: Pacemaker malfunction [T82.111A]      Subjective:   No complaints this AM, no chest pain or shortness of breath    Interval History: (History of pertinent interval events obtained from nursing staff)    ROS:  GEN:  No fever or chills  Cardiovascular:  As noted above  Pulmonary:  As noted above  Neuro:  No new focal motor or sensory loss      Objective:     Vitals:    07/30/19 2059 07/31/19 0058 07/31/19 0454 07/31/19 0520   BP: 106/42 102/49 119/54    Pulse: 73 62 64    Resp: 16 16 17    Temp: 97.4 °F (36.3 °C) 98 °F (36.7 °C) 98.4 °F (36.9 °C)    SpO2: 96% 98% 96%    Weight:    55.8 kg (123 lb)   Height:           Physical Exam:  General-Well Developed, Well Nourished, No Acute Distress, Alert & Oriented x 3, appropriate mood. Neck- supple, no JVD  CV- RR, paced, mech S2  Lung- clear bilaterally  Abd- soft, nontender, nondistended  Ext- no edema bilaterally.   Skin- warm and dry    Current Facility-Administered Medications   Medication Dose Route Frequency    carvedilol (COREG) tablet 3.125 mg  3.125 mg Oral BID WITH MEALS    docusate sodium (COLACE) capsule 100 mg  100 mg Oral BID PRN    doxazosin (CARDURA) tablet 4 mg  4 mg Oral DAILY    finasteride (PROSCAR) tablet 5 mg  5 mg Oral DAILY    furosemide (LASIX) tablet 20 mg  20 mg Oral BID    isosorbide mononitrate ER (IMDUR) tablet 30 mg  30 mg Oral DAILY    lisinopril (PRINIVIL, ZESTRIL) tablet 2.5 mg  2.5 mg Oral DAILY    meclizine (ANTIVERT) tablet 25 mg  25 mg Oral TID PRN    simvastatin (ZOCOR) tablet 20 mg  20 mg Oral QHS    sodium chloride (NS) flush 5-40 mL  5-40 mL IntraVENous Q8H    sodium chloride (NS) flush 5-40 mL  5-40 mL IntraVENous PRN    morphine injection 2 mg  2 mg IntraVENous Q4H PRN    ceFAZolin (ANCEF) 1,000 mg in 0.9% sodium chloride (MBP/ADV) 50 mL ADV  1 g IntraVENous Q8H     Data Review:   Recent Results (from the past 24 hour(s))   CBC WITH AUTOMATED DIFF    Collection Time: 07/30/19 12:11 PM   Result Value Ref Range    WBC 5.6 4.3 - 11.1 K/uL    RBC 4.30 4.23 - 5.6 M/uL    HGB 13.0 (L) 13.6 - 17.2 g/dL    HCT 38.4 (L) 41.1 - 50.3 %    MCV 89.3 79.6 - 97.8 FL    MCH 30.2 26.1 - 32.9 PG    MCHC 33.9 31.4 - 35.0 g/dL    RDW 14.6 11.9 - 14.6 %    PLATELET 458 516 - 661 K/uL    MPV 10.3 9.4 - 12.3 FL    ABSOLUTE NRBC 0.00 0.0 - 0.2 K/uL    DF AUTOMATED      NEUTROPHILS 67 43 - 78 %    LYMPHOCYTES 16 13 - 44 %    MONOCYTES 13 (H) 4.0 - 12.0 %    EOSINOPHILS 3 0.5 - 7.8 %    BASOPHILS 0 0.0 - 2.0 %    IMMATURE GRANULOCYTES 0 0.0 - 5.0 %    ABS. NEUTROPHILS 3.8 1.7 - 8.2 K/UL    ABS. LYMPHOCYTES 0.9 0.5 - 4.6 K/UL    ABS. MONOCYTES 0.7 0.1 - 1.3 K/UL    ABS. EOSINOPHILS 0.2 0.0 - 0.8 K/UL    ABS. BASOPHILS 0.0 0.0 - 0.2 K/UL    ABS. IMM. GRANS. 0.0 0.0 - 0.5 K/UL   METABOLIC PANEL, COMPREHENSIVE    Collection Time: 07/30/19 12:11 PM   Result Value Ref Range    Sodium 135 (L) 136 - 145 mmol/L    Potassium 4.0 3.5 - 5.1 mmol/L    Chloride 100 98 - 107 mmol/L    CO2 30 21 - 32 mmol/L    Anion gap 5 (L) 7 - 16 mmol/L    Glucose 86 65 - 100 mg/dL    BUN 21 8 - 23 MG/DL    Creatinine 0.89 0.8 - 1.5 MG/DL    GFR est AA >60 >60 ml/min/1.73m2    GFR est non-AA >60 >60 ml/min/1.73m2    Calcium 8.3 8.3 - 10.4 MG/DL    Bilirubin, total 0.6 0.2 - 1.1 MG/DL    ALT (SGPT) 36 12 - 65 U/L    AST (SGOT) 75 (H) 15 - 37 U/L    Alk.  phosphatase 181 (H) 50 - 136 U/L    Protein, total 6.9 6.3 - 8.2 g/dL    Albumin 2.8 (L) 3.2 - 4.6 g/dL    Globulin 4.1 (H) 2.3 - 3.5 g/dL    A-G Ratio 0.7 (L) 1.2 - 3.5     MAGNESIUM    Collection Time: 07/30/19 12:11 PM   Result Value Ref Range    Magnesium 1.9 1.8 - 2.4 mg/dL   EKG, 12 LEAD, INITIAL    Collection Time: 07/30/19 12:12 PM   Result Value Ref Range    Ventricular Rate 60 BPM    Atrial Rate 60 BPM    P-R Interval 168 ms    QRS Duration 216 ms    Q-T Interval 534 ms    QTC Calculation (Farideh) 534 ms    Calculated P Axis 94 degrees    Calculated R Axis -135 degrees    Calculated T Axis -14 degrees    Diagnosis       AV dual-paced rhythm  Confirmed by Nolan Plants ([id number]) on 7/31/2019 6:18:48 AM     POC TROPONIN-I    Collection Time: 07/30/19 12:15 PM   Result Value Ref Range    Troponin-I (POC) 0.01 (L) 0.02 - 0.05 ng/ml   TROPONIN I    Collection Time: 07/30/19  4:01 PM   Result Value Ref Range    Troponin-I, Qt. 1.13 (HH) 0.02 - 0.05 NG/ML   URINALYSIS W/ RFLX MICROSCOPIC    Collection Time: 07/30/19  6:09 PM   Result Value Ref Range    Color YELLOW      Appearance HAZY      Specific gravity 1.010 1.001 - 1.023      pH (UA) 7.0 5.0 - 9.0      Protein NEGATIVE  NEG mg/dL    Glucose NEGATIVE  NEG mg/dL    Ketone NEGATIVE  NEG mg/dL    Bilirubin NEGATIVE  NEG      Blood SMALL (A) NEG      Urobilinogen 1.0 0.2 - 1.0 EU/dL    Nitrites NEGATIVE  NEG      Leukocyte Esterase MODERATE (A) NEG     URINE MICROSCOPIC    Collection Time: 07/30/19  6:09 PM   Result Value Ref Range    WBC 3-5 0 /hpf    RBC 0-3 0 /hpf    Epithelial cells 0 0 /hpf    Bacteria TRACE 0 /hpf    Casts 0 0 /lpf    Crystals, urine 0 0 /LPF    Mucus 0 0 /lpf    Other observations MICROSCOPIC PERFORMED ON UNSPUN URINE SAMPLE.      PROTHROMBIN TIME + INR    Collection Time: 07/31/19  4:18 AM   Result Value Ref Range    Prothrombin time 27.1 (H) 11.7 - 14.5 sec    INR 2.6         EKG:  (EKG has been independently visualized by me with interpretation below)  Assessment:     Principal Problem:    Pacemaker lead malfunction (7/30/2019)    Active Problems:    Essential hypertension (1/9/2014)      Complete heart block ([address] (7/22/2016)      S/P AVR (11/1/2016)      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 RV lead dislodgement: lead sensitivity increased to 7.5, output as low as programmable, stable LV lead pacing and threshold, plan for RV lead reveision probably tomorrow given increased INR this AM to 2.6  2. AVR: on warfarin, INR 2.6, hold warfarin for RV lead revision as noted above  3. CHB: stable, excellent sensing/pacing via LV lead, plan for RV lead revision  4. PPX: on warfarin as noted above. Rohan Lowery MD  Cardiology/Electrophysiology

## 2019-07-31 NOTE — PROGRESS NOTES
To request a  after 4:30 p.m., please call Christiana at [phone]. Thank you,      Lujean Cranker, [address] [facility] [address] /  [email or web address] c: [phone] / [address] / [place], [address] [facility]  [email or web address] [facility]

## 2019-07-31 NOTE — ROUTINE PROCESS
Bedside and Verbal shift change report given to Tory Gutierrez RN (oncoming nurse) by self [facility] Reasoner nurse). Report included the following information SBAR, Kardex and MAR.

## 2019-07-31 NOTE — PROGRESS NOTES
Problem: Falls - Risk of  Goal: *Absence of Falls  Description  Document Radha Bonilla Fall Risk and appropriate interventions in the flowsheet.   Outcome: Progressing Towards Goal  Note:   Fall Risk Interventions:  Mobility Interventions: Patient to call before getting OOB         Medication Interventions: Patient to call before getting OOB

## 2019-07-31 NOTE — PROGRESS NOTES
Called to room by patient complaining of blurry vision. Pt states it is improving but still blurry. Neuro assessment completed and negative for any other new changes. BP 84/46. Patient positioned supine in bed and instructed not to sit up or get out of the bed. BP cycling q15min on eagle. NP Juan Manuel Grier made aware, no new orders received at this time. Will continue to monitor. 115 PT BP recheck 73/39, verbal order from Juan Manuel Grier to give 150 ml bolus NS and recheck.

## 2019-07-31 NOTE — PROGRESS NOTES
Bedside and Verbal shift change report given to self (oncoming nurse) by Gil Quinones RN (offgoing nurse). Report included the following information SBAR, Kardex, ED Summary, Intake/Output, MAR, Recent Results and Cardiac Rhythm Paced.

## 2019-07-31 NOTE — PROGRESS NOTES
Bedside and Verbal shift change report given to [facility] (oncoming nurse) by self (offgoing nurse). Report included the following information SBAR, Kardex, Intake/Output, MAR and Recent Results.

## 2019-07-31 NOTE — PROGRESS NOTES
Care Management Interventions  PCP Verified by CM: Yes  Last Visit to PCP: 05/13/19  Mode of Transport at Discharge: Other (see comment)([place],Musa Friend [phone] )  Transition of Care Consult (CM Consult): Discharge Planning  Discharge Durable Medical Equipment: No  Physical Therapy Consult: No  Occupational Therapy Consult: No  Speech Therapy Consult: No  Current Support Network: Lives with Spouse, Own Home  Confirm Follow Up Transport: Family  Plan discussed with Pt/Family/Caregiver: Yes  Freedom of Choice Offered: Yes  Discharge Location  Discharge Placement: Rehab Unit Subacute      Pt admitted to 3rd floor [facility] for pacemaker malfunction. CM met with pt to discuss CM needs & DCP. Pt is deaf,  at bedside. Pt is A&Ox4. Pt is indep at home with all ADLS. Pt lives with spouse. Pt was recently dc from [facility], does not wish to return, some concerns about the care he was receibing. Call placed to Admin at [facility], no call returned. Pt has no DME needs. Pt has no difficulty with obtaining medications or transport. Pt would like to go to [facility], referral sent. DCP STR. CM to continue to monitor.

## 2019-08-01 ENCOUNTER — ANESTHESIA (OUTPATIENT)
Dept: SURGERY | Age: 84
DRG: 261 | End: 2019-08-01
Payer: MEDICARE

## 2019-08-01 ENCOUNTER — APPOINTMENT (OUTPATIENT)
Dept: GENERAL RADIOLOGY | Age: 84
DRG: 261 | End: 2019-08-01
Attending: INTERNAL MEDICINE
Payer: MEDICARE

## 2019-08-01 ENCOUNTER — ANESTHESIA EVENT (OUTPATIENT)
Dept: SURGERY | Age: 84
DRG: 261 | End: 2019-08-01
Payer: MEDICARE

## 2019-08-01 LAB
INR PPP: 1.7
PROTHROMBIN TIME: 20 SEC (ref 11.7–14.5)

## 2019-08-01 PROCEDURE — 10140 I&D HMTMA SEROMA/FLUID COLLJ: CPT

## 2019-08-01 PROCEDURE — 74011250636 HC RX REV CODE- 250/636

## 2019-08-01 PROCEDURE — 65660000000 HC RM CCU STEPDOWN

## 2019-08-01 PROCEDURE — 77030037400 HC ADH TISS HI VISC EXOFIN CHMP -B

## 2019-08-01 PROCEDURE — 99218 HC RM OBSERVATION: CPT

## 2019-08-01 PROCEDURE — 77030028698 HC BLD TISS PLSM MEDT -D

## 2019-08-01 PROCEDURE — 33215 REPOSITION PACING-DEFIB LEAD: CPT

## 2019-08-01 PROCEDURE — 74011250637 HC RX REV CODE- 250/637: Performed by: INTERNAL MEDICINE

## 2019-08-01 PROCEDURE — 36415 COLL VENOUS BLD VENIPUNCTURE: CPT

## 2019-08-01 PROCEDURE — 74011250636 HC RX REV CODE- 250/636: Performed by: INTERNAL MEDICINE

## 2019-08-01 PROCEDURE — 77030011747 HC SEAL HEMSTAT TELE -C

## 2019-08-01 PROCEDURE — 74011000258 HC RX REV CODE- 258: Performed by: NURSE PRACTITIONER

## 2019-08-01 PROCEDURE — 74011000250 HC RX REV CODE- 250: Performed by: INTERNAL MEDICINE

## 2019-08-01 PROCEDURE — 74011250636 HC RX REV CODE- 250/636: Performed by: NURSE PRACTITIONER

## 2019-08-01 PROCEDURE — 77030018547 HC SUT ETHBND1 J&J -B

## 2019-08-01 PROCEDURE — 74011000258 HC RX REV CODE- 258: Performed by: INTERNAL MEDICINE

## 2019-08-01 PROCEDURE — 71045 X-RAY EXAM CHEST 1 VIEW: CPT

## 2019-08-01 PROCEDURE — 02WA0MZ REVISION OF CARDIAC LEAD IN HEART, OPEN APPROACH: ICD-10-PCS | Performed by: INTERNAL MEDICINE

## 2019-08-01 PROCEDURE — 74011250637 HC RX REV CODE- 250/637: Performed by: PHYSICIAN ASSISTANT

## 2019-08-01 PROCEDURE — 93005 ELECTROCARDIOGRAM TRACING: CPT | Performed by: INTERNAL MEDICINE

## 2019-08-01 PROCEDURE — 0JC60ZZ EXTIRPATION OF MATTER FROM CHEST SUBCUTANEOUS TISSUE AND FASCIA, OPEN APPROACH: ICD-10-PCS | Performed by: INTERNAL MEDICINE

## 2019-08-01 PROCEDURE — 76060000033 HC ANESTHESIA 1 TO 1.5 HR: Performed by: INTERNAL MEDICINE

## 2019-08-01 PROCEDURE — 74011000272 HC RX REV CODE- 272: Performed by: INTERNAL MEDICINE

## 2019-08-01 PROCEDURE — 77030012935 HC DRSG AQUACEL BMS -B

## 2019-08-01 PROCEDURE — 77030022704 HC SUT VLOC COVD -B

## 2019-08-01 PROCEDURE — 74011250637 HC RX REV CODE- 250/637: Performed by: NURSE PRACTITIONER

## 2019-08-01 PROCEDURE — 77030033067 HC SUT PDO STRATFX SPIR J&J -B

## 2019-08-01 PROCEDURE — A4565 SLINGS: HCPCS

## 2019-08-01 PROCEDURE — 85610 PROTHROMBIN TIME: CPT

## 2019-08-01 RX ORDER — LIDOCAINE HYDROCHLORIDE 20 MG/ML
INJECTION, SOLUTION EPIDURAL; INFILTRATION; INTRACAUDAL; PERINEURAL AS NEEDED
Status: DISCONTINUED | OUTPATIENT
Start: 2019-08-01 | End: 2019-08-01 | Stop reason: HOSPADM

## 2019-08-01 RX ORDER — ONDANSETRON 2 MG/ML
4 INJECTION INTRAMUSCULAR; INTRAVENOUS
Status: DISCONTINUED | OUTPATIENT
Start: 2019-08-01 | End: 2019-08-05 | Stop reason: HOSPADM

## 2019-08-01 RX ORDER — CEFAZOLIN SODIUM 1 G/3ML
INJECTION, POWDER, FOR SOLUTION INTRAMUSCULAR; INTRAVENOUS AS NEEDED
Status: DISCONTINUED | OUTPATIENT
Start: 2019-08-01 | End: 2019-08-01 | Stop reason: HOSPADM

## 2019-08-01 RX ORDER — DOCUSATE SODIUM 100 MG/1
100 CAPSULE, LIQUID FILLED ORAL
Status: DISCONTINUED | OUTPATIENT
Start: 2019-08-01 | End: 2019-08-05 | Stop reason: HOSPADM

## 2019-08-01 RX ORDER — SODIUM CHLORIDE, SODIUM LACTATE, POTASSIUM CHLORIDE, CALCIUM CHLORIDE 600; 310; 30; 20 MG/100ML; MG/100ML; MG/100ML; MG/100ML
INJECTION, SOLUTION INTRAVENOUS
Status: DISCONTINUED | OUTPATIENT
Start: 2019-08-01 | End: 2019-08-01 | Stop reason: HOSPADM

## 2019-08-01 RX ORDER — PROPOFOL 10 MG/ML
INJECTION, EMULSION INTRAVENOUS AS NEEDED
Status: DISCONTINUED | OUTPATIENT
Start: 2019-08-01 | End: 2019-08-01 | Stop reason: HOSPADM

## 2019-08-01 RX ORDER — SODIUM CHLORIDE 0.9 % (FLUSH) 0.9 %
5-40 SYRINGE (ML) INJECTION EVERY 8 HOURS
Status: DISCONTINUED | OUTPATIENT
Start: 2019-08-01 | End: 2019-08-05 | Stop reason: HOSPADM

## 2019-08-01 RX ORDER — HYDROCODONE BITARTRATE AND ACETAMINOPHEN 5; 325 MG/1; MG/1
1 TABLET ORAL
Status: DISCONTINUED | OUTPATIENT
Start: 2019-08-01 | End: 2019-08-05 | Stop reason: HOSPADM

## 2019-08-01 RX ORDER — ACETAMINOPHEN 325 MG/1
650 TABLET ORAL
Status: DISCONTINUED | OUTPATIENT
Start: 2019-08-01 | End: 2019-08-05 | Stop reason: HOSPADM

## 2019-08-01 RX ORDER — LIDOCAINE HYDROCHLORIDE AND EPINEPHRINE 10; 10 MG/ML; UG/ML
1.5 INJECTION, SOLUTION INFILTRATION; PERINEURAL
Status: DISCONTINUED | OUTPATIENT
Start: 2019-08-01 | End: 2019-08-05 | Stop reason: HOSPADM

## 2019-08-01 RX ORDER — SODIUM CHLORIDE 0.9 % (FLUSH) 0.9 %
5-40 SYRINGE (ML) INJECTION AS NEEDED
Status: DISCONTINUED | OUTPATIENT
Start: 2019-08-01 | End: 2019-08-04

## 2019-08-01 RX ORDER — PROPOFOL 10 MG/ML
INJECTION, EMULSION INTRAVENOUS
Status: DISCONTINUED | OUTPATIENT
Start: 2019-08-01 | End: 2019-08-01 | Stop reason: HOSPADM

## 2019-08-01 RX ORDER — HYDROCODONE BITARTRATE AND ACETAMINOPHEN 7.5; 325 MG/1; MG/1
1 TABLET ORAL
Status: DISCONTINUED | OUTPATIENT
Start: 2019-08-01 | End: 2019-08-05 | Stop reason: HOSPADM

## 2019-08-01 RX ADMIN — DOCUSATE SODIUM 100 MG: 100 CAPSULE, LIQUID FILLED ORAL at 00:19

## 2019-08-01 RX ADMIN — CARVEDILOL 3.12 MG: 3.12 TABLET, FILM COATED ORAL at 08:44

## 2019-08-01 RX ADMIN — NEOMYCIN AND POLYMYXIN B SULFATES: 40; 200000 SOLUTION IRRIGATION at 18:01

## 2019-08-01 RX ADMIN — HYDROCODONE BITARTRATE AND ACETAMINOPHEN 1 TABLET: 7.5; 325 TABLET ORAL at 20:57

## 2019-08-01 RX ADMIN — PROPOFOL 140 MCG/KG/MIN: 10 INJECTION, EMULSION INTRAVENOUS at 18:01

## 2019-08-01 RX ADMIN — ISOSORBIDE MONONITRATE 30 MG: 30 TABLET, EXTENDED RELEASE ORAL at 08:44

## 2019-08-01 RX ADMIN — LIDOCAINE HYDROCHLORIDE,EPINEPHRINE BITARTRATE 15 MG: 10; .01 INJECTION, SOLUTION INFILTRATION; PERINEURAL at 18:01

## 2019-08-01 RX ADMIN — SIMVASTATIN 20 MG: 20 TABLET, FILM COATED ORAL at 21:01

## 2019-08-01 RX ADMIN — SODIUM CHLORIDE, SODIUM LACTATE, POTASSIUM CHLORIDE, CALCIUM CHLORIDE: 600; 310; 30; 20 INJECTION, SOLUTION INTRAVENOUS at 17:52

## 2019-08-01 RX ADMIN — LIDOCAINE HYDROCHLORIDE 100 MG: 20 INJECTION, SOLUTION EPIDURAL; INFILTRATION; INTRACAUDAL; PERINEURAL at 18:01

## 2019-08-01 RX ADMIN — CARVEDILOL 3.12 MG: 3.12 TABLET, FILM COATED ORAL at 20:57

## 2019-08-01 RX ADMIN — LISINOPRIL 2.5 MG: 5 TABLET ORAL at 08:43

## 2019-08-01 RX ADMIN — Medication 10 ML: at 21:01

## 2019-08-01 RX ADMIN — SODIUM CHLORIDE, SODIUM LACTATE, POTASSIUM CHLORIDE, CALCIUM CHLORIDE: 600; 310; 30; 20 INJECTION, SOLUTION INTRAVENOUS at 18:05

## 2019-08-01 RX ADMIN — FINASTERIDE 5 MG: 5 TABLET, FILM COATED ORAL at 08:44

## 2019-08-01 RX ADMIN — Medication 10 ML: at 21:02

## 2019-08-01 RX ADMIN — Medication 5 ML: at 13:16

## 2019-08-01 RX ADMIN — DOXAZOSIN 4 MG: 4 TABLET ORAL at 08:44

## 2019-08-01 RX ADMIN — HYDROCODONE BITARTRATE AND ACETAMINOPHEN 1 TABLET: 7.5; 325 TABLET ORAL at 11:42

## 2019-08-01 RX ADMIN — CEFTRIAXONE SODIUM 1 G: 1 INJECTION, POWDER, FOR SOLUTION INTRAMUSCULAR; INTRAVENOUS at 08:48

## 2019-08-01 RX ADMIN — FUROSEMIDE 20 MG: 20 TABLET ORAL at 08:43

## 2019-08-01 RX ADMIN — FUROSEMIDE 20 MG: 20 TABLET ORAL at 20:57

## 2019-08-01 RX ADMIN — PROPOFOL 50 MG: 10 INJECTION, EMULSION INTRAVENOUS at 18:01

## 2019-08-01 RX ADMIN — SODIUM CHLORIDE 1000 MG: 900 INJECTION, SOLUTION INTRAVENOUS at 00:23

## 2019-08-01 RX ADMIN — CEFAZOLIN SODIUM 2 G: 1 INJECTION, POWDER, FOR SOLUTION INTRAMUSCULAR; INTRAVENOUS at 18:05

## 2019-08-01 RX ADMIN — Medication 5 ML: at 05:07

## 2019-08-01 NOTE — PROGRESS NOTES
Interpreting services available when necessary. Please call [phone] for any in-person requests. Nathanael Van Bryson Verduzco  [email or web address]  c: [phone] / [address] / [place], [facility]  [email or web address]

## 2019-08-01 NOTE — PROGRESS NOTES
Bedside and Verbal shift change report given to self (oncoming nurse) by Linda Billingsley RN (offgoing nurse). Report included the following information SBAR, Kardex, Intake/Output, MAR and Recent Results.

## 2019-08-01 NOTE — PROGRESS NOTES
TRANSFER - OUT REPORT:    Verbal report given to MACHO Dee(name) on Valdo Sas  being transferred to room 330(unit) for routine progression of care       Report consisted of patients Situation, Background, Assessment and   Recommendations(SBAR). Information from the following report(s) Procedure Summary was reviewed with the receiving nurse. Lines:   Peripheral IV 07/30/19 Left Antecubital (Active)   Site Assessment Clean, dry, & intact 8/1/2019 11:40 AM   Phlebitis Assessment 0 8/1/2019 11:40 AM   Infiltration Assessment 0 8/1/2019 11:40 AM   Dressing Status Clean, dry, & intact 8/1/2019 11:40 AM   Dressing Type Transparent 8/1/2019 11:40 AM   Hub Color/Line Status Patent 8/1/2019 11:40 AM   Alcohol Cap Used No 8/1/2019  4:11 AM       Peripheral IV 07/31/19 Posterior;Right Wrist (Active)   Site Assessment Clean, dry, & intact 8/1/2019 11:40 AM   Phlebitis Assessment 0 8/1/2019 11:40 AM   Infiltration Assessment 0 8/1/2019 11:40 AM   Dressing Status Clean, dry, & intact 8/1/2019 11:40 AM   Dressing Type Transparent 8/1/2019 11:40 AM   Hub Color/Line Status Patent 8/1/2019 11:40 AM   Alcohol Cap Used No 8/1/2019  4:11 AM        Opportunity for questions and clarification was provided.

## 2019-08-01 NOTE — PROGRESS NOTES
[facility] CARDIOLOGY PROGRESS NOTE           8/1/2019 6:57 AM    Admit Date: 7/30/2019    Admit Diagnosis: Pacemaker malfunction [T82.111A]      Subjective:   No complaints this AM, no chest pain or shortness of breath. History limited as pt is deaf. HoTN yesterday improved today. Interval History: (History of pertinent interval events obtained from nursing staff)    ROS:  GEN:  No fever or chills  Cardiovascular:  As noted above  Pulmonary:  As noted above  Neuro:  No new focal motor or sensory loss      Objective:     Vitals:    07/31/19 1805 07/31/19 2048 08/01/19 0049 08/01/19 0445   BP: (!) 89/45 106/47 122/67 116/61   Pulse: 65 67 66 62   Resp: 17 16 17 18   Temp: 98.2 °F (36.8 °C) 97.8 °F (36.6 °C) 97.6 °F (36.4 °C) 98.1 °F (36.7 °C)   SpO2: 97% 96% 98% 97%   Weight:    126 lb 10.4 oz (57.4 kg)   Height:           Physical Exam:  General-Well Developed, Well Nourished, No Acute Distress, Alert & Oriented x 3, appropriate mood. Neck- supple, no JVD  CV- RR, paced, mech S2  Lung- clear bilaterally  Abd- soft, nontender, nondistended  Ext- no edema bilaterally.   Skin- warm and dry    Current Facility-Administered Medications   Medication Dose Route Frequency    carvedilol (COREG) tablet 3.125 mg  3.125 mg Oral BID WITH MEALS    docusate sodium (COLACE) capsule 100 mg  100 mg Oral BID PRN    doxazosin (CARDURA) tablet 4 mg  4 mg Oral DAILY    finasteride (PROSCAR) tablet 5 mg  5 mg Oral DAILY    furosemide (LASIX) tablet 20 mg  20 mg Oral BID    isosorbide mononitrate ER (IMDUR) tablet 30 mg  30 mg Oral DAILY    lisinopril (PRINIVIL, ZESTRIL) tablet 2.5 mg  2.5 mg Oral DAILY    meclizine (ANTIVERT) tablet 25 mg  25 mg Oral TID PRN    simvastatin (ZOCOR) tablet 20 mg  20 mg Oral QHS    sodium chloride (NS) flush 5-40 mL  5-40 mL IntraVENous Q8H    sodium chloride (NS) flush 5-40 mL  5-40 mL IntraVENous PRN    morphine injection 2 mg  2 mg IntraVENous Q4H PRN    ceFAZolin (ANCEF) 1,000 mg in 0.9% sodium chloride (MBP/ADV) 50 mL ADV  1 g IntraVENous Q8H     Data Review:   No results found for this or any previous visit (from the past 24 hour(s)). EKG:  (EKG has been independently visualized by me with interpretation below)  Assessment:     Principal Problem:    Pacemaker lead malfunction (7/30/2019)    Active Problems:    Essential hypertension (1/9/2014)      Complete heart block ([address] (7/22/2016)      S/P AVR (11/1/2016)      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 RV lead dislodgement: lead sensitivity increased to 7.5, output as low as programmable, stable LV lead pacing and threshold, plan for RV lead reveision possibly today pending INR. Ideally <2.0.  2. UTI - on CTX. Stopped ancef today. 3. AVR: on warfarin, INR 2.6, hold warfarin for RV lead revision as noted above  4. CHB: stable, excellent sensing/pacing via LV lead, plan for RV lead revision  5. PPX: on warfarin as noted above. PACEMAKER  Additionally, I discussed with the patient the potential risks of pacemaker implantation, including the risk of bleeding, infection, venous occlusion, DVT/PE, pneumothorax, cardiac tamponade, perforation, need for urgent open heart surgery, device/lead failure, lead dislodgement, heart attack, stroke, arrhythmia, radiation skin injury, kidney damage/failure oversedation, respiratory arrest, and even death. The patient understands these risks in the context of the potential benefits of the device implantation, and agrees to proceed. Antony Welsh.  Kaitlyn Smith MD, [address] [facility] Cardiac Electrophysiology  [address] Cardiology

## 2019-08-01 NOTE — PROGRESS NOTES
Report received from [employer]. Procedural findings communicated. Intra procedural  medication administration reviewed. Progression of care discussed.      Patient received into CPRU [place] 7 post Lead revision/pocket    Access site without bleeding or swelling yes    Dressing dry and intact yes    Patient instructed to limit movement to left upper extremity    Routine post procedural vital signs and site assessment initiated yes

## 2019-08-01 NOTE — PHYSICIAN ADVISORY
Letter of Determination: Upgrade from Observation to Inpatient Status This patient was originally hospitalized as Outpatient Status with Observation Services on 7/30/2019 for a pacemaker lead malfucntion. This patient now meets for Inpatient Admission in accordance with CMS regulation Section 43 .3. Specifically, patient's stay is now over Two Midnights and was medically necessary. The patient's stay was medically necessary based on troponin-I of 1.13 ng/ml, and vital signs significant for blood pressure of 78/40 mmHg. Consistent with CMS guidelines, patient meets for inpatient status. It is our recommendation that this patient's hospitalization status should be upgraded from OBSERVATION to INPATIENT status.  
  
The final decision regarding the patient's hospitalization status depends on the attending physician's judgement. Deloris Childress MD, NERIS, Physician Advisor [address] [place] [address].

## 2019-08-01 NOTE — PROGRESS NOTES
Problem: Falls - Risk of  Goal: *Absence of Falls  Description  Document Nathanielyancikerry Pierre Fall Risk and appropriate interventions in the flowsheet. Outcome: Progressing Towards Goal  Note:   Fall Risk Interventions:  Mobility Interventions: Communicate number of staff needed for ambulation/transfer, Patient to call before getting OOB         Medication Interventions: Patient to call before getting OOB, Teach patient to arise slowly, Evaluate medications/consider consulting pharmacy              Pt being kept on bedrest; understands not to get up without help. Independent in elimination in the sense that he has grimes; calls for BM     Problem: Patient Education: Go to Patient Education Activity  Goal: Patient/Family Education  Outcome: Progressing Towards Goal     Problem: Pacer/ICD: Pre-Procedure  Goal: Activity/Safety  Outcome: Progressing Towards Goal  Pt to have a lead fixed once INR is lower. Goal: Diagnostic Test/Procedures  Outcome: Progressing Towards Goal  Goal: Medications  Outcome: Progressing Towards Goal  Zocor given tonight. Coumadin on hold.

## 2019-08-01 NOTE — PROCEDURES
Attending: Jennifer Cao. Denis Ziegler MD    Referring: Leanne Berman MD    Procedure: RV lead revision and pocket evacuation    Pre-Procedure Diagnosis  1. Chronic systolic heart failure, ejection fraction of 20-25%  2. Nonischemic dilated cardiomyopathy. 3. Class II heart failure symptoms. 4. Life expectancy greater than 1 year. 5. Complete heart block     Procedure Performed  1. RV lead revision/reposition. 2. Pocket revision and hematoma evacuation.     Anesthesia: MAC      Estimated Blood Loss: Less than 10 mL          Specimens: * No specimens in log *      Complications: None     Fluoroscopy Time: 1.0 minutes / 4 mGy     Contrast: 0 mL                Patient Information and Indications: The procedure, indications, risks, benefits, and alternatives were discussed with the patient and family members, who desired to proceed after questions were answered and informed consent was documented. After informed consent was obtained, the patient was brought to the Electrophysiology Laboratory in a post-absorptive and was prepped and draped in sterile fashion. Prophylactic antibiotic was administered prior to skin incision. Conscious sedation was administered with continuous oxygen saturation measurement and blood pressure measurement by Anesthesia. Local anesthetic (1% lidocaine with epinephrine) was delivered to the left pectoral region and an incision was made parallel to the deltopectoral groove directly over the prior surgical scar. The subcutaneous pocket was opened using blunt dissection and Bovie electrocautery using the PlasmaBlade ([employer]), and adequate hemostasis was established. Of note, about 40-50 cc of dark red gelatinous blood was removed from the pocket. The pocket was further revised to ensure adequate hemostasis. The device was freed from overlying fibrotic tissue and the leads freed to give enough slack for device exchange.   The RV lead was removed from the pulse generator and the sutures were removed from its lead collar. A stylet was advanced to the lead tip and the helix was retracted. The lead was then repositioned in the RV apex with stable lead parameters. The lead was then sutured to the base of the pocket around the lead collar with 2-0 Ethibond. The RV lead pin was then cleaned with antibiotic soaked gauze, dried gently, and reinserted into the BiV pacemaker generator. Pins were directly observed to pass the tip electrode, and the ring hex wrench screws were secured, and leads tug tested. The device and leads were gently positioned within the pocket. The pocket was irrigated copiously with a saline antimicrobial solution and D-Stat hemostatic solution was placed in the pocket. A retention suture was placed at the base of the pocket. The device was and leads were tested a second time prior to pocket closure. The wound was closed with multiple layers of absorbable suture with 2-0 monocryl in a running fashion. Exofin solution was placed over the wound, allowed to dry, and then a sterile Aquacel dressing was placed over the wound. The patient tolerated the procedure well and there was no complications. The patient was allowed to awake from anesthesia and was transferred to the recovery area in stable condition. All sharps and sponges were accounted for x 2.     Device and Lead Information  Pulse Generator Model #  Serial # Location Implant/Explant   [id number] Biotronik [id number] Left Pectoral Implanted on 07.15.19     Lead Model Number  Serial Number Lead position Implant/Explant   RA [id number] Biotronik [id number] RA Appendage Implanted on 07.15.19   RV [id number] Biotronik [id number] RV [place] Implanted on 07.15.19   LV [id number] Biotronik [id number] LV Lateral Implanted on 07.15.19     Lead Sensitivity and Threshold  Lead R or P sensitivity (mv) Threshold (V) Threshold PW (msec) Impedance (ohms) Final output Voltage (V) Final PW (msec)   RA 3.8 0.8 0.4 526 3.6 0.4   RV 10.3 0.7 0.4 643 4.8 0.4   LV 11.6 0.7 0.4 526 1.7 0.4     Bradycardia Settings  Mateo Mode LRL URL Pace AVD (ms) Sense AVD (ms) Rate Response Mode Switching Mode SW Rate   DDD-CLS 60 120 180 180 On On 160     LV Pacing Configuration: LV1tip? [id number]      IMPRESSION: 1) Successful RV lead revision and pocket revision. PLAN:  1) Routine CIED instructions. 2) Device clinic follow up in 1-2 weeks. 3) Routine cardiology follow up with Dr. Tayla Ohara. 4) Hold warfarin for an additional 24-48 hours and restart without bridging. Jaciel Quinteros.  Raudel Romeo MD, [address]  Clinical Cardiac Electrophysiology  [facility]  8/1/2019  6:58 PM

## 2019-08-01 NOTE — PROGRESS NOTES
Bedside and Verbal shift change report given to MACHO Friedman (oncoming nurse) by self (offgoing nurse). Report included the following information SBAR, Kardex, ED Summary, Intake/Output, MAR, Recent Results and Cardiac Rhythm Paced. Visualized L subclavian site with oncoming RN.

## 2019-08-01 NOTE — ANESTHESIA PREPROCEDURE EVALUATION
Relevant Problems   No relevant active problems       Anesthetic History   No history of anesthetic complications            Review of Systems / Medical History  Patient summary reviewed and pertinent labs reviewed    Pulmonary  Within defined limits                 Neuro/Psych   Within defined limits           Cardiovascular    Hypertension: well controlled  Valvular problems/murmurs (s/p AVR in 1990s with mechanical valve)      Dysrhythmias (symptomatic bradycardia and intermittent Complete Heart Block)   CAD and hyperlipidemia    Exercise tolerance: <4 METS  Comments: Echo showed LVEF 20-25%, mild to moderate MR, severe TR with RVSP of 63mmHg   GI/Hepatic/Renal  Within defined limits              Endo/Other  Within defined limits           Other Findings              Physical Exam    Airway  Mallampati: II  TM Distance: 4 - 6 cm  Neck ROM: normal range of motion   Mouth opening: Normal     Cardiovascular        Systolic click       Dental  No notable dental hx       Pulmonary  Breath sounds clear to auscultation               Abdominal  GI exam deferred       Other Findings            Anesthetic Plan    ASA: 4  Anesthesia type: total IV anesthesia          Induction: Intravenous  Anesthetic plan and risks discussed with: Patient and Sibling

## 2019-08-01 NOTE — PROGRESS NOTES
TRANSFER - IN REPORT:    Verbal report received from Prep and Recovery RN on Rose Ayers being received from Post OP for routine progression of care. Report consisted of patients Situation, Background, Assessment and Recommendations(SBAR). Information from the following report(s) SBAR, Kardex, Procedure Summary, MAR and Cardiac Rhythm Paced was reviewed. Opportunity for questions and clarification was provided. Assessment completed upon patients arrival to unit and care assumed. Patient received to room 330. Patient connected to monitor and assessment completed. Plan of care reviewed. Patient oriented to room and call light. Patient aware to use call light to communicate any chest pain or needs. Left subclavian site with aquacel in place. Sling on.

## 2019-08-01 NOTE — PROGRESS NOTES
To request a  after 4:30 p.m., please call Christiana at [phone]. Thank you,      Corey Moore, [address] [facility] 151 /  [email or web address] c: [phone] / [address] / [place], [address] [facility]  [email or web address]

## 2019-08-02 LAB
ANION GAP SERPL CALC-SCNC: 6 MMOL/L (ref 7–16)
ATRIAL RATE: 67 BPM
BACTERIA SPEC CULT: NORMAL
BASOPHILS # BLD: 0 K/UL (ref 0–0.2)
BASOPHILS NFR BLD: 1 % (ref 0–2)
BUN SERPL-MCNC: 21 MG/DL (ref 8–23)
CALCIUM SERPL-MCNC: 8.1 MG/DL (ref 8.3–10.4)
CALCULATED R AXIS, ECG10: -113 DEGREES
CALCULATED T AXIS, ECG11: 84 DEGREES
CHLORIDE SERPL-SCNC: 99 MMOL/L (ref 98–107)
CO2 SERPL-SCNC: 28 MMOL/L (ref 21–32)
CREAT SERPL-MCNC: 0.84 MG/DL (ref 0.8–1.5)
DIAGNOSIS, 93000: NORMAL
DIFFERENTIAL METHOD BLD: ABNORMAL
EOSINOPHIL # BLD: 0.3 K/UL (ref 0–0.8)
EOSINOPHIL NFR BLD: 6 % (ref 0.5–7.8)
ERYTHROCYTE [DISTWIDTH] IN BLOOD BY AUTOMATED COUNT: 14.2 % (ref 11.9–14.6)
GLUCOSE SERPL-MCNC: 79 MG/DL (ref 65–100)
HCT VFR BLD AUTO: 38.8 % (ref 41.1–50.3)
HGB BLD-MCNC: 12.9 G/DL (ref 13.6–17.2)
IMM GRANULOCYTES # BLD AUTO: 0 K/UL (ref 0–0.5)
IMM GRANULOCYTES NFR BLD AUTO: 0 % (ref 0–5)
LYMPHOCYTES # BLD: 0.7 K/UL (ref 0.5–4.6)
LYMPHOCYTES NFR BLD: 16 % (ref 13–44)
MCH RBC QN AUTO: 30.3 PG (ref 26.1–32.9)
MCHC RBC AUTO-ENTMCNC: 33.2 G/DL (ref 31.4–35)
MCV RBC AUTO: 91.1 FL (ref 79.6–97.8)
MONOCYTES # BLD: 0.5 K/UL (ref 0.1–1.3)
MONOCYTES NFR BLD: 12 % (ref 4–12)
NEUTS SEG # BLD: 2.9 K/UL (ref 1.7–8.2)
NEUTS SEG NFR BLD: 65 % (ref 43–78)
NRBC # BLD: 0 K/UL (ref 0–0.2)
P-R INTERVAL, ECG05: 180 MS
PLATELET # BLD AUTO: 203 K/UL (ref 150–450)
PMV BLD AUTO: 10.7 FL (ref 9.4–12.3)
POTASSIUM SERPL-SCNC: 4.2 MMOL/L (ref 3.5–5.1)
Q-T INTERVAL, ECG07: 516 MS
QRS DURATION, ECG06: 152 MS
QTC CALCULATION (BEZET), ECG08: 545 MS
RBC # BLD AUTO: 4.26 M/UL (ref 4.23–5.6)
SERVICE CMNT-IMP: NORMAL
SODIUM SERPL-SCNC: 133 MMOL/L (ref 136–145)
URATE SERPL-MCNC: 5 MG/DL (ref 2.6–6)
VENTRICULAR RATE, ECG03: 67 BPM
WBC # BLD AUTO: 4.5 K/UL (ref 4.3–11.1)

## 2019-08-02 PROCEDURE — 65660000000 HC RM CCU STEPDOWN

## 2019-08-02 PROCEDURE — 84550 ASSAY OF BLOOD/URIC ACID: CPT

## 2019-08-02 PROCEDURE — 85025 COMPLETE CBC W/AUTO DIFF WBC: CPT

## 2019-08-02 PROCEDURE — 74011250637 HC RX REV CODE- 250/637: Performed by: INTERNAL MEDICINE

## 2019-08-02 PROCEDURE — 80048 BASIC METABOLIC PNL TOTAL CA: CPT

## 2019-08-02 PROCEDURE — 74011250636 HC RX REV CODE- 250/636: Performed by: INTERNAL MEDICINE

## 2019-08-02 PROCEDURE — 74011000258 HC RX REV CODE- 258: Performed by: INTERNAL MEDICINE

## 2019-08-02 PROCEDURE — 36415 COLL VENOUS BLD VENIPUNCTURE: CPT

## 2019-08-02 PROCEDURE — 94760 N-INVAS EAR/PLS OXIMETRY 1: CPT

## 2019-08-02 RX ADMIN — HYDROCODONE BITARTRATE AND ACETAMINOPHEN 1 TABLET: 7.5; 325 TABLET ORAL at 11:20

## 2019-08-02 RX ADMIN — LISINOPRIL 2.5 MG: 5 TABLET ORAL at 08:26

## 2019-08-02 RX ADMIN — Medication 5 ML: at 07:16

## 2019-08-02 RX ADMIN — Medication 5 ML: at 22:21

## 2019-08-02 RX ADMIN — ISOSORBIDE MONONITRATE 30 MG: 30 TABLET, EXTENDED RELEASE ORAL at 08:26

## 2019-08-02 RX ADMIN — FUROSEMIDE 20 MG: 20 TABLET ORAL at 08:26

## 2019-08-02 RX ADMIN — SIMVASTATIN 20 MG: 20 TABLET, FILM COATED ORAL at 22:21

## 2019-08-02 RX ADMIN — CARVEDILOL 3.12 MG: 3.12 TABLET, FILM COATED ORAL at 08:26

## 2019-08-02 RX ADMIN — FINASTERIDE 5 MG: 5 TABLET, FILM COATED ORAL at 08:26

## 2019-08-02 RX ADMIN — DOXAZOSIN 4 MG: 4 TABLET ORAL at 08:26

## 2019-08-02 RX ADMIN — FUROSEMIDE 20 MG: 20 TABLET ORAL at 17:41

## 2019-08-02 RX ADMIN — CARVEDILOL 3.12 MG: 3.12 TABLET, FILM COATED ORAL at 16:14

## 2019-08-02 RX ADMIN — CEFTRIAXONE SODIUM 1 G: 1 INJECTION, POWDER, FOR SOLUTION INTRAMUSCULAR; INTRAVENOUS at 08:25

## 2019-08-02 NOTE — PROGRESS NOTES
[facility] CARDIOLOGY PROGRESS NOTE           8/2/2019 6:40 AM    Admit Date: 7/30/2019    Admit Diagnosis: Pacemaker malfunction [T82.111A]; Pacemaker malfunction [T82.111A]      Subjective:   No complaints this AM, no chest pain or shortness of breath, appropriate post op device pocket pain    Interval History: (History of pertinent interval events obtained from nursing staff)  Cardiac device placed yesterday, no events overnight, no immediate complications    ROS:  GEN:  No fever or chills  Cardiovascular:  As noted above  Pulmonary:  As noted above  Neuro:  No new focal motor or sensory loss      Objective:     Vitals:    08/01/19 1945 08/01/19 2057 08/02/19 0124 08/02/19 0434   BP: 150/69 126/62 98/55 116/60   Pulse: 60 67 61 70   Resp: 17  17 17   Temp: 97.6 °F (36.4 °C)  97.3 °F (36.3 °C) 97.5 °F (36.4 °C)   SpO2: 99%  96% 94%   Weight:    55.5 kg (122 lb 6.4 oz)   Height:           Physical Exam:  General-Well Developed, Well Nourished, No Acute Distress, Alert & Oriented x 3, appropriate mood. CV- regular rate and rhythm no MRG, left infraclavicular pocket with dressing in place, no evidence of hematoma, redness, warmth or excessive drainage  Lung- clear bilaterally  Abd- soft, nontender, nondistended  Ext- no edema bilaterally.     Current Facility-Administered Medications   Medication Dose Route Frequency    cefTRIAXone (ROCEPHIN) 1 g in 0.9% sodium chloride (MBP/ADV) 50 mL  1 g IntraVENous Q24H    HYDROcodone-acetaminophen (NORCO) 7.5-325 mg per tablet 1 Tab  1 Tab Oral Q6H PRN    lidocaine-EPINEPHrine (XYLOCAINE) 1 %-1:100,000 injection 15 mg  1.5 mL IntraDERMal Multiple    sodium chloride (NS) flush 5-40 mL  5-40 mL IntraVENous Q8H    sodium chloride (NS) flush 5-40 mL  5-40 mL IntraVENous PRN    acetaminophen (TYLENOL) tablet 650 mg  650 mg Oral Q4H PRN    HYDROcodone-acetaminophen (NORCO) 5-325 mg per tablet 1 Tab  1 Tab Oral Q4H PRN    ondansetron (ZOFRAN) injection 4 mg 4 mg IntraVENous Q4H PRN    docusate sodium (COLACE) capsule 100 mg  100 mg Oral BID PRN    carvedilol (COREG) tablet 3.125 mg  3.125 mg Oral BID WITH MEALS    docusate sodium (COLACE) capsule 100 mg  100 mg Oral BID PRN    doxazosin (CARDURA) tablet 4 mg  4 mg Oral DAILY    finasteride (PROSCAR) tablet 5 mg  5 mg Oral DAILY    furosemide (LASIX) tablet 20 mg  20 mg Oral BID    isosorbide mononitrate ER (IMDUR) tablet 30 mg  30 mg Oral DAILY    lisinopril (PRINIVIL, ZESTRIL) tablet 2.5 mg  2.5 mg Oral DAILY    meclizine (ANTIVERT) tablet 25 mg  25 mg Oral TID PRN    simvastatin (ZOCOR) tablet 20 mg  20 mg Oral QHS    sodium chloride (NS) flush 5-40 mL  5-40 mL IntraVENous Q8H    sodium chloride (NS) flush 5-40 mL  5-40 mL IntraVENous PRN    morphine injection 2 mg  2 mg IntraVENous Q4H PRN     Data Review:   Recent Results (from the past 24 hour(s))   PROTHROMBIN TIME + INR    Collection Time: 08/01/19  8:17 AM   Result Value Ref Range    Prothrombin time 20.0 (H) 11.7 - 14.5 sec    INR 1.7     EKG, 12 LEAD, INITIAL    Collection Time: 08/01/19  7:51 PM   Result Value Ref Range    Ventricular Rate 67 BPM    Atrial Rate 67 BPM    P-R Interval 180 ms    QRS Duration 152 ms    Q-T Interval 516 ms    QTC Calculation (Bezet) 545 ms    Calculated R Axis -113 degrees    Calculated T Axis 84 degrees    Diagnosis       AV dual-paced rhythm with occasional Premature ventricular complexes  Biventricular pacemaker detected  Abnormal ECG  When compared with ECG of 01-AUG-2019 19:50,  Premature ventricular complexes are now Present  Vent.  rate has increased BY   4 BPM  Confirmed by Bharti Brown ([id number]) on 8/2/2019 5:59:57 AM     METABOLIC PANEL, BASIC    Collection Time: 08/02/19  5:01 AM   Result Value Ref Range    Sodium 133 (L) 136 - 145 mmol/L    Potassium 4.2 3.5 - 5.1 mmol/L    Chloride 99 98 - 107 mmol/L    CO2 28 21 - 32 mmol/L    Anion gap 6 (L) 7 - 16 mmol/L    Glucose 79 65 - 100 mg/dL    BUN 21 8 - 23 MG/DL    Creatinine 0.84 0.8 - 1.5 MG/DL    GFR est AA >60 >60 ml/min/1.73m2    GFR est non-AA >60 >60 ml/min/1.73m2    Calcium 8.1 (L) 8.3 - 10.4 MG/DL   CBC WITH AUTOMATED DIFF    Collection Time: 08/02/19  5:01 AM   Result Value Ref Range    WBC 4.5 4.3 - 11.1 K/uL    RBC 4.26 4.23 - 5.6 M/uL    HGB 12.9 (L) 13.6 - 17.2 g/dL    HCT 38.8 (L) 41.1 - 50.3 %    MCV 91.1 79.6 - 97.8 FL    MCH 30.3 26.1 - 32.9 PG    MCHC 33.2 31.4 - 35.0 g/dL    RDW 14.2 11.9 - 14.6 %    PLATELET 746 908 - 310 K/uL    MPV 10.7 9.4 - 12.3 FL    ABSOLUTE NRBC 0.00 0.0 - 0.2 K/uL    DF AUTOMATED      NEUTROPHILS 65 43 - 78 %    LYMPHOCYTES 16 13 - 44 %    MONOCYTES 12 4.0 - 12.0 %    EOSINOPHILS 6 0.5 - 7.8 %    BASOPHILS 1 0.0 - 2.0 %    IMMATURE GRANULOCYTES 0 0.0 - 5.0 %    ABS. NEUTROPHILS 2.9 1.7 - 8.2 K/UL    ABS. LYMPHOCYTES 0.7 0.5 - 4.6 K/UL    ABS. MONOCYTES 0.5 0.1 - 1.3 K/UL    ABS. EOSINOPHILS 0.3 0.0 - 0.8 K/UL    ABS. BASOPHILS 0.0 0.0 - 0.2 K/UL    ABS. IMM. GRANS. 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 Cardiac device implantation: Pt device interrogation this AM reveals normal function, excellent pacing and sensing parameters, CXR without pneumothorax with excellent device position, no recognized complications, stable for discharge home today with appropriate follow up. 2. AVR: restart home warfarin tonight  3. Dispo: social work consult for disposition plan      Jael Lowery MD  Cardiology/Electrophysiology

## 2019-08-02 NOTE — ANESTHESIA POSTPROCEDURE EVALUATION
Procedure(s):  Lead Revision. total IV anesthesia    Anesthesia Post Evaluation      Multimodal analgesia: multimodal analgesia used between 6 hours prior to anesthesia start to PACU discharge  Patient location during evaluation: PACU  Patient participation: complete - patient participated  Level of consciousness: awake  Pain management: adequate  Airway patency: patent  Anesthetic complications: no  Cardiovascular status: acceptable and hemodynamically stable  Respiratory status: acceptable  Hydration status: acceptable  Comments: Acceptable for discharge from PACU. Post anesthesia nausea and vomiting:  none      No vitals data found for the desired time range.

## 2019-08-02 NOTE — PROGRESS NOTES
Bedside and Verbal shift change report given to self (oncoming nurse) by Paulette Campbell (offgoing nurse). Report included the following information SBAR, Kardex, Intake/Output and MAR. L SC pacer site visualized, no apparent bleeding or hematoma noted.

## 2019-08-02 NOTE — PROGRESS NOTES
Bedside and verbal report given to Michelle Shields by Herber Bose. Report included the following information SBAR, Kardex, Intake/Output and MAR. Oncoming RN assumed care of the patient.         L SC pacer site visualized, no apparent bleeding or hematoma noted

## 2019-08-02 NOTE — PROGRESS NOTES
Care Management Interventions  PCP Verified by CM: Yes  Last Visit to PCP: 05/13/19  Mode of Transport at Discharge: Other (see comment)(Musa Cox Friend [phone] )  Transition of Care Consult (CM Consult): Discharge Planning  Discharge Durable Medical Equipment: No  Physical Therapy Consult: No  Occupational Therapy Consult: No  Speech Therapy Consult: No  Current Support Network: Lives with Spouse, Own Home  Confirm Follow Up Transport: Family  Plan discussed with Pt/Family/Caregiver: Yes  Freedom of Choice Offered: Yes  Discharge Location  Discharge Placement: Rehab Unit Subacute      CM spoke with Sofia Evans- rehab coordinator, no beds available until next Wednesday. Pt states he would rather return home, CM discussed safety issues and benefit of rehab agreeable to additional referrals, would like discussion with Scotty Gary in law. Call to [facility], requesting referral to [facility], [facility]. Call to [facility] Liaison will review referral. CM to continue to monitor.

## 2019-08-02 NOTE — PROGRESS NOTES
Bedside and Verbal shift change report given to Mike Chamberlain RN (oncoming nurse) by self (offgoing nurse). Report included the following information SBAR, Kardex, ED Summary, Procedure Summary, Intake/Output, MAR, Recent Results and Cardiac Rhythm Paced. L subclavian pacer site c/d/i. Bruising at site from previous hematoma.

## 2019-08-02 NOTE — PROGRESS NOTES
To request a  after 4:30 p.m., please call Christiana at [phone]. Thank you,      Mine Fay, [address] [facility] 151 /  [email or web address] c: [phone] / [address]  [facility] 63 / [place], [address]   [email or web address]

## 2019-08-02 NOTE — PROGRESS NOTES
Problem: Falls - Risk of  Goal: *Absence of Falls  Description  Document Radha Bonilla Fall Risk and appropriate interventions in the flowsheet.   Outcome: Progressing Towards Goal  Note:   Fall Risk Interventions:  Mobility Interventions: Communicate number of staff needed for ambulation/transfer, Patient to call before getting OOB         Medication Interventions: Evaluate medications/consider consulting pharmacy, Patient to call before getting OOB, Teach patient to arise slowly    Elimination Interventions: Call light in reach, Patient to call for help with toileting needs              Problem: Patient Education: Go to Patient Education Activity  Goal: Patient/Family Education  Outcome: Progressing Towards Goal     Problem: Pacer/ICD: Post-Procedure  Goal: Activity/Safety  Outcome: Progressing Towards Goal  Goal: Diagnostic Test/Procedures  Outcome: Progressing Towards Goal  Goal: Nutrition/Diet  Outcome: Progressing Towards Goal  Goal: Medications  Outcome: Progressing Towards Goal  Goal: Respiratory  Outcome: Progressing Towards Goal  Goal: Treatments/Interventions/Procedures  Outcome: Progressing Towards Goal  Goal: *Hemodynamically stable  Outcome: Progressing Towards Goal  Goal: *Optimal pain control at patient's stated goal  Outcome: Progressing Towards Goal

## 2019-08-02 NOTE — PROGRESS NOTES
Pt up off bedrest. Moved without much difficulty. Mendoza catheter removed. Balloon fluid that was removed with syringe (8mL) was pink/brown tinged. Balloon looked intact and no blood came out with removal.  Pt up to toilet for BM and stated he was voiding.

## 2019-08-03 LAB
ANION GAP SERPL CALC-SCNC: 5 MMOL/L (ref 7–16)
BUN SERPL-MCNC: 25 MG/DL (ref 8–23)
CALCIUM SERPL-MCNC: 8 MG/DL (ref 8.3–10.4)
CHLORIDE SERPL-SCNC: 100 MMOL/L (ref 98–107)
CO2 SERPL-SCNC: 29 MMOL/L (ref 21–32)
CREAT SERPL-MCNC: 0.78 MG/DL (ref 0.8–1.5)
GLUCOSE SERPL-MCNC: 79 MG/DL (ref 65–100)
INR PPP: 1.4
POTASSIUM SERPL-SCNC: 4 MMOL/L (ref 3.5–5.1)
PROTHROMBIN TIME: 17 SEC (ref 11.7–14.5)
SODIUM SERPL-SCNC: 134 MMOL/L (ref 136–145)

## 2019-08-03 PROCEDURE — 65660000000 HC RM CCU STEPDOWN

## 2019-08-03 PROCEDURE — 80048 BASIC METABOLIC PNL TOTAL CA: CPT

## 2019-08-03 PROCEDURE — 85610 PROTHROMBIN TIME: CPT

## 2019-08-03 PROCEDURE — 97161 PT EVAL LOW COMPLEX 20 MIN: CPT

## 2019-08-03 PROCEDURE — 74011000258 HC RX REV CODE- 258: Performed by: INTERNAL MEDICINE

## 2019-08-03 PROCEDURE — 97530 THERAPEUTIC ACTIVITIES: CPT

## 2019-08-03 PROCEDURE — 36415 COLL VENOUS BLD VENIPUNCTURE: CPT

## 2019-08-03 PROCEDURE — 74011250636 HC RX REV CODE- 250/636: Performed by: INTERNAL MEDICINE

## 2019-08-03 PROCEDURE — 74011250637 HC RX REV CODE- 250/637: Performed by: INTERNAL MEDICINE

## 2019-08-03 RX ORDER — WARFARIN SODIUM 5 MG/1
5 TABLET ORAL EVERY EVENING
Status: DISCONTINUED | OUTPATIENT
Start: 2019-08-03 | End: 2019-08-05

## 2019-08-03 RX ADMIN — Medication 10 ML: at 06:09

## 2019-08-03 RX ADMIN — CARVEDILOL 3.12 MG: 3.12 TABLET, FILM COATED ORAL at 08:37

## 2019-08-03 RX ADMIN — Medication 10 ML: at 21:12

## 2019-08-03 RX ADMIN — SIMVASTATIN 20 MG: 20 TABLET, FILM COATED ORAL at 21:12

## 2019-08-03 RX ADMIN — DOXAZOSIN 4 MG: 4 TABLET ORAL at 08:37

## 2019-08-03 RX ADMIN — Medication 10 ML: at 14:20

## 2019-08-03 RX ADMIN — FUROSEMIDE 20 MG: 20 TABLET ORAL at 17:14

## 2019-08-03 RX ADMIN — CARVEDILOL 3.12 MG: 3.12 TABLET, FILM COATED ORAL at 17:14

## 2019-08-03 RX ADMIN — CEFTRIAXONE SODIUM 1 G: 1 INJECTION, POWDER, FOR SOLUTION INTRAMUSCULAR; INTRAVENOUS at 08:38

## 2019-08-03 RX ADMIN — ISOSORBIDE MONONITRATE 30 MG: 30 TABLET, EXTENDED RELEASE ORAL at 08:37

## 2019-08-03 RX ADMIN — LISINOPRIL 2.5 MG: 5 TABLET ORAL at 08:37

## 2019-08-03 RX ADMIN — FUROSEMIDE 20 MG: 20 TABLET ORAL at 08:37

## 2019-08-03 RX ADMIN — WARFARIN SODIUM 5 MG: 5 TABLET ORAL at 17:14

## 2019-08-03 RX ADMIN — FINASTERIDE 5 MG: 5 TABLET, FILM COATED ORAL at 08:37

## 2019-08-03 NOTE — PROGRESS NOTES
Problem: Falls - Risk of  Goal: *Absence of Falls  Description  Document Jennifer Roscoe Fall Risk and appropriate interventions in the flowsheet. Outcome: Progressing Towards Goal  Note:   Fall Risk Interventions:  Mobility Interventions: Patient to call before getting OOB         Medication Interventions: Patient to call before getting OOB    Elimination Interventions: Call light in reach              Problem: Pacer/ICD: Discharge Outcomes  Goal: *Stable cardiac rhythm  Outcome: Progressing Towards Goal  Goal: *Demonstrates ability to perform prescribed activity without shortness of breath or discomfort  Outcome: Progressing Towards Goal  Goal: *Anxiety reduced or absent  Outcome: Progressing Towards Goal     Problem: Pressure Injury - Risk of  Goal: *Prevention of pressure injury  Description  Document Demetrio Scale and appropriate interventions in the flowsheet.   Outcome: Progressing Towards Goal  Note:   Pressure Injury Interventions:  Sensory Interventions: Check visual cues for pain, Minimize linen layers    Moisture Interventions: Apply protective barrier, creams and emollients    Activity Interventions: Pressure redistribution bed/mattress(bed type)    Mobility Interventions: Pressure redistribution bed/mattress (bed type)    Nutrition Interventions: Document food/fluid/supplement intake    Friction and Shear Interventions: HOB 30 degrees or less, Foam dressings/transparent film/skin sealants

## 2019-08-03 NOTE — PROGRESS NOTES
[facility] CARDIOLOGY PROGRESS NOTE           8/3/2019 6:40 AM    Admit Date: 7/30/2019    Admit Diagnosis: Pacemaker malfunction [T82.111A]; Pacemaker malfunction [T82.111A]      Subjective:   No complaints this AM, no chest pain or shortness of breath, appropriate post op device pocket pain    Interval History: (History of pertinent interval events obtained from nursing staff)  Cardiac device placed yesterday, no events overnight, no immediate complications    ROS:  GEN:  No fever or chills  Cardiovascular:  As noted above  Pulmonary:  As noted above  Neuro:  No new focal motor or sensory loss      Objective:     Vitals:    08/03/19 0110 08/03/19 0429 08/03/19 0513 08/03/19 0805   BP: 117/63  116/58 117/66   Pulse: 71  64 65   Resp: 18  18 17   Temp: 98.2 °F (36.8 °C)  98.3 °F (36.8 °C) 99.8 °F (37.7 °C)   SpO2: 96%  97% 97%   Weight:  123 lb (55.8 kg)     Height:           Physical Exam:  General-Well Developed, Well Nourished, No Acute Distress, Alert & Oriented x 3, appropriate mood. CV- regular rate and rhythm no MRG, left infraclavicular pocket with dressing in place, no evidence of hematoma, redness, warmth or excessive drainage  Lung- clear bilaterally  Abd- soft, nontender, nondistended  Ext- no edema bilaterally.     Current Facility-Administered Medications   Medication Dose Route Frequency    HYDROcodone-acetaminophen (NORCO) 7.5-325 mg per tablet 1 Tab  1 Tab Oral Q6H PRN    lidocaine-EPINEPHrine (XYLOCAINE) 1 %-1:100,000 injection 15 mg  1.5 mL IntraDERMal Multiple    sodium chloride (NS) flush 5-40 mL  5-40 mL IntraVENous Q8H    sodium chloride (NS) flush 5-40 mL  5-40 mL IntraVENous PRN    acetaminophen (TYLENOL) tablet 650 mg  650 mg Oral Q4H PRN    HYDROcodone-acetaminophen (NORCO) 5-325 mg per tablet 1 Tab  1 Tab Oral Q4H PRN    ondansetron (ZOFRAN) injection 4 mg  4 mg IntraVENous Q4H PRN    docusate sodium (COLACE) capsule 100 mg  100 mg Oral BID PRN    carvedilol (COREG) tablet 3.125 mg  3.125 mg Oral BID WITH MEALS    docusate sodium (COLACE) capsule 100 mg  100 mg Oral BID PRN    doxazosin (CARDURA) tablet 4 mg  4 mg Oral DAILY    finasteride (PROSCAR) tablet 5 mg  5 mg Oral DAILY    furosemide (LASIX) tablet 20 mg  20 mg Oral BID    isosorbide mononitrate ER (IMDUR) tablet 30 mg  30 mg Oral DAILY    lisinopril (PRINIVIL, ZESTRIL) tablet 2.5 mg  2.5 mg Oral DAILY    meclizine (ANTIVERT) tablet 25 mg  25 mg Oral TID PRN    simvastatin (ZOCOR) tablet 20 mg  20 mg Oral QHS    sodium chloride (NS) flush 5-40 mL  5-40 mL IntraVENous Q8H    sodium chloride (NS) flush 5-40 mL  5-40 mL IntraVENous PRN    morphine injection 2 mg  2 mg IntraVENous Q4H PRN     Data Review:   Recent Results (from the past 24 hour(s))   METABOLIC PANEL, BASIC    Collection Time: 08/03/19  4:23 AM   Result Value Ref Range    Sodium 134 (L) 136 - 145 mmol/L    Potassium 4.0 3.5 - 5.1 mmol/L    Chloride 100 98 - 107 mmol/L    CO2 29 21 - 32 mmol/L    Anion gap 5 (L) 7 - 16 mmol/L    Glucose 79 65 - 100 mg/dL    BUN 25 (H) 8 - 23 MG/DL    Creatinine 0.78 (L) 0.8 - 1.5 MG/DL    GFR est AA >60 >60 ml/min/1.73m2    GFR est non-AA >60 >60 ml/min/1.73m2    Calcium 8.0 (L) 8.3 - 10.4 MG/DL       EKG:  (EKG has been independently visualized by me with interpretation below)  Assessment:     Principal Problem:    Pacemaker lead malfunction (7/30/2019)    Active Problems:    Essential hypertension (1/9/2014)      Complete heart block (HCC) (7/22/2016)      S/P AVR (11/1/2016)      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 Cardiac device implantation: S/p post lead revision. Feeling well. Pacemaker site without obvious hematoma. 2. AVR: restart warfarin, goal 2.5-3.0.   3. Dispo: pending approval at rehab. SW consult. PT consult. Evaristo Hummel.  Sandra Bernard MD, MS  Clinical Cardiac Electrophysiology  [facility] Cardiology

## 2019-08-03 NOTE — PROGRESS NOTES
Bedside and Verbal shift change report given to Trevor Shen (oncoming nurse) by self (offgoing nurse). Report included the following information SBAR, Kardex, Intake/Output, MAR and Recent Results.

## 2019-08-03 NOTE — PROGRESS NOTES
Problem: Mobility Impaired (Adult and Pediatric)  Goal: *Acute Goals and Plan of Care (Insert Text)  Description  STG:  (1.)Mr. Tha Bustamante will move from supine to sit and sit to supine , scoot up and down and roll side to side with STAND BY ASSIST within 3 treatment day(s). (2.)Mr. Tha Bustamante will transfer from bed to chair and chair to bed with STAND BY ASSIST using the least restrictive device within 3 treatment day(s). (3.)Mr. Tha Bustamante will ambulate with STAND BY ASSIST for 250 feet with the least restrictive device within 3 treatment day(s). (4.)Mr. Tha Bustamante will perform standing static and dynamic balance activities x 15 minutes with STAND BY ASSIST to improve safety within 3 day(s). (5.)Mr. Tha Bustamante will maintain stable vital signs throughout all functional mobility within 3 days. LTG:  (1.)Mr. Tha Bustamante will move from supine to sit and sit to supine , scoot up and down and roll side to side in bed with SUPERVISION within 7 treatment day(s). (2.)Mr. Tha Bustamante will transfer from bed to chair and chair to bed with SUPERVISION using the least restrictive device within 7 treatment day(s). (3.)Mr. Tha Bustamante will ambulate with SUPERVISION for 250 feet with the least restrictive device within 7 treatment day(s). (4.)Mr. Tha Bustamante will perform standing static and dynamic balance activities x 15 minutes with SUPERVISION to improve safety within 7 day(s).   ________________________________________________________________________________________________     Outcome: Progressing Towards Goal     PHYSICAL THERAPY: Initial Assessment, Daily Note and PM 8/3/2019  INPATIENT: PT Visit Days : 1  [place] / [place] / Product Type: Medicare /       NAME/AGE/GENDER: Reyes Jones is a 82 y.o. male   PRIMARY DIAGNOSIS: Pacemaker malfunction [T82.111A]  Pacemaker malfunction [T82.111A] Pacemaker lead malfunction   Pacemaker lead malfunction    Procedure(s) (LRB):  Lead Revision (N/A)  2 Days Post-Op  ICD-10: Treatment Diagnosis:    · Difficulty in walking, Not elsewhere classified (R26.2)   Precaution/Allergies:  Procaine      ASSESSMENT:     Mr. Pamela Laboy is a 82 y.o. Admitted s/p pacemaker malfunction.  present for initial evaluation. He lives with spouse in a trailer and typically ambulates independently and performs yard work. Reports 2 falls in the past 6 months on uneven terrain. Discussed pacemaker precautions with patient and he verbalizes understanding. He required minimal assistance to transfer to sitting, minimal assist to stand. Noted increased trunk sway and impaired balance in standing. Ambulated within room with minimal assist via hand held assist and cues for safety. Treatment initiated to include ambulation in hallway with hand held assist requiring a few standing rest breaks due to fatigue. Hema Bustamante is currently functioning below his baseline and would benefit from skilled PT during acute care stay to maximize safety and independence with functional mobility. This section established at most recent assessment   PROBLEM LIST (Impairments causing functional limitations):  1. Decreased Strength  2. Decreased ADL/Functional Activities  3. Decreased Transfer Abilities  4. Decreased Ambulation Ability/Technique  5. Decreased Balance  6. Increased Pain  7. Decreased Activity Tolerance  8. Decreased Pacing Skills  9. Increased Fatigue  10. Decreased Knowledge of Precautions  11. Decreased [place] with Home Exercise Program   INTERVENTIONS PLANNED: (Benefits and precautions of physical therapy have been discussed with the patient.)  1. Balance Exercise  2. Bed Mobility  3. Family Education  4. Gait Training  5. Home Exercise Program (HEP)  6. Therapeutic Activites  7. Therapeutic Exercise/Strengthening  8.  Transfer Training     TREATMENT PLAN: Frequency/Duration: 3 times a week for duration of hospital stay  Rehabilitation Potential For Stated Goals: Good REHAB RECOMMENDATIONS (at time of discharge pending progress):    Placement: It is my opinion, based on this patient's performance to date, that Mr. Robinson Barillas may benefit from intensive therapy at an [address] [place] [address] after discharge due to potential to make ongoing and sustainable functional improvement that is of practical value. .  Equipment:    None at this time              HISTORY:   History of Present Injury/Illness (Reason for Referral):  Rajeev Rodriguez is a 82 y.o. male with known hx of mechanical AVR since 1990, transient CHB, NICM with recent upgrade of device to BIV PM. He presents to ER today with complaints of SOB and feeling poorly. S/p BIV PM on 7/15. Interrogation of device today reveals RV lead displaced and high LV threshold. 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 ITs a PM not ICD. Past Medical History/Comorbidities:   Mr. Robinson Barillas  has a past medical history of Abnormal EKG (1/9/2014), Coronary atherosclerosis of native coronary vessel (7/26/2012), Essential hypertension (1/9/2014), H/O prosthetic aortic valve replacement, HLD (hyperlipidemia) (7/26/2012), Malignant neoplasm of prostate ([facility] [address] (7/26/2012), PND (paroxysmal nocturnal dyspnea) (5/1/2017), S/P AVR (11/1/2016), Second-degree heart block; MOBITZ I; Emelia Hilario (6/29/2016), Slow pulse, and Valvular heart disease. Mr. Robinson Barillas  has a past surgical history that includes hx coronary artery bypass graft; hx aortic valve replacement (4/12/1990); and hx heart valve surgery.   Social History/Living Environment:   Home Environment: Trailer/mobile home  # Steps to Enter: 4  Rails to Enter: Yes  Hand Rails : Bilateral  One/Two Story Residence: One story  Living Alone: Yes  Support Systems: Spouse/Significant Other/Partner  Patient Expects to be Discharged to[de-identified] Trailer/mobile home  Current DME Used/Available at Home: None  Prior Level of Function/Work/Activity:  He lives with spouse in a trailer and typically ambulates independently and performs yard work. Reports 2 falls in the past 6 months on uneven terrain. Number of Personal Factors/Comorbidities that affect the Plan of Care: 3+: HIGH COMPLEXITY   EXAMINATION:   Most Recent Physical Functioning:   Gross Assessment:  AROM: Generally decreased, functional  PROM: Generally decreased, functional  Strength: Generally decreased, functional               Posture:  Posture (WDL): Exceptions to WDL  Posture Assessment: Forward head, Rounded shoulders  Balance:  Sitting: Intact  Standing: Impaired  Standing - Static: Good;Fair  Standing - Dynamic : Fair Bed Mobility:  Rolling: Minimum assistance  Supine to Sit: Minimum assistance  Sit to Supine: Minimum assistance  Scooting: Minimum assistance  Wheelchair Mobility:     Transfers:  Sit to Stand: Minimum assistance  Stand to Sit: Minimum assistance  Gait:     Base of Support: Center of gravity altered;Narrowed  Speed/Ladonna: Slow;Shuffled  Step Length: Right shortened;Left shortened  Gait Abnormalities: Decreased step clearance; Path deviations;Trunk sway increased  Distance (ft): 100 Feet (ft)(x2)  Ambulation - Level of Assistance: Minimal assistance  Interventions: Manual cues; Safety awareness training;Verbal cues      Body Structures Involved:  1. Nerves  2. Heart  3. Muscles Body Functions Affected:  1. Sensory/Pain  2. Cardio  3. Neuromusculoskeletal  4. Movement Related Activities and Participation Affected:  1. Mobility  2. Self Care  3. Domestic Life  4. Interpersonal Interactions and Relationships  5. Community, Social and [place]   Number of elements that affect the Plan of Care: 3: MODERATE COMPLEXITY   CLINICAL PRESENTATION:   Presentation: Stable and uncomplicated: LOW COMPLEXITY   CLINICAL DECISION MAKIN [facility] [address] [id number] AM-PAC 6 Clicks   Basic Mobility Inpatient Short Form  How much difficulty does the patient currently have. .. Unable A Lot A Little None   1.   Turning over in bed (including adjusting bedclothes, sheets and blankets)? ? 1   ? 2   ? 3   ? 4   2. Sitting down on and standing up from a chair with arms ( e.g., wheelchair, bedside commode, etc.)   ? 1   ? 2   ? 3   ? 4   3. Moving from lying on back to sitting on the side of the bed?   ? 1   ? 2   ? 3   ? 4   How much help from another person does the patient currently need. .. Total A Lot A Little None   4. Moving to and from a bed to a chair (including a wheelchair)? ? 1   ? 2   ? 3   ? 4   5. Need to walk in hospital room? ? 1   ? 2   ? 3   ? 4   6. Climbing 3-5 steps with a railing? ? 1   ? 2   ? 3   ? 4   © 2007, Trustees of [address] [place] [address] [address] [id number], under license to [employer]. All rights reserved      Score:  Initial: 14 Most Recent: X (Date: -- )    Interpretation of Tool:  Represents activities that are increasingly more difficult (i.e. Bed mobility, Transfers, Gait). Medical Necessity:     · Patient demonstrates good rehab potential due to higher previous functional level. Reason for Services/Other Comments:  · Patient continues to require modification of therapeutic interventions to increase complexity of exercises. Use of outcome tool(s) and clinical judgement create a POC that gives a: Clear prediction of patient's progress: LOW COMPLEXITY            TREATMENT:   (In addition to Assessment/Re-Assessment sessions the following treatments were rendered)   Pre-treatment Symptoms/Complaints:  none  Pain: Initial:   Pain Intensity 1: 0  Post Session:  0     Therapeutic Activity: (    8 minutes): Therapeutic activities including Ambulation on level ground to improve mobility, strength and balance. Required minimal Manual cues; Safety awareness training;Verbal cues to promote static and dynamic balance in standing.      Braces/Orthotics/Lines/Etc:   · Sling LUE  · O2 Device: Room air  Treatment/Session Assessment:    · Response to Treatment:  Patient ambulated 100' x 2 with HHA min assist.  · Interdisciplinary Collaboration:   o Physical Therapist  o Registered Nurse  · After treatment position/precautions:   o Supine in bed  o Bed/Chair-wheels locked  o Bed in low position  o Call light within reach  o RN notified   · Compliance with Program/Exercises: Will assess as treatment progresses  · Recommendations/Intent for next treatment session: \"Next visit will focus on advancements to more challenging activities and reduction in assistance provided\".   Total Treatment Duration:  PT Patient Time In/Time Out  Time In: 1534  Time Out: 1601    Pasha Carrasco, PT, DPT

## 2019-08-03 NOTE — PROGRESS NOTES
Warfarin dosing per pharmacist    Radha Monet is a 82 y.o. male. Height: 5' 4\" (162.6 cm)    Weight: 55.8 kg (123 lb)    Indication:  Aortic valve replacement    Goal INR:  2.5 to 3.0 per Dr. Arielle Ramirez dose:  5 mg daily    Risk factors or significant drug interactions:  N/A    Other anticoagulants:  N/A    Daily Monitoring  Date  INR     Warfarin dose HGB              Notes  7/31  2.6  ---    8/1  1.7  ---  8/2  ---  ---  12.9  8/3  1.4  5 mg        Pharmacy consulted to assist in dosing warfarin in patient with history of Aortic valve replacement who presented with therapeutic INR of 2.6. Warfarin was held for RV lead revision and pocket revision procedure on 8/1/19. On 8/3/19, warfarin to be resumed. Will give 5 mg this evening. Daily INR for now.     Thank you,  Bárbara Child, PharmD  Clinical Pharmacist  [phone]

## 2019-08-03 NOTE — PROGRESS NOTES
Patient called this RN to room and was c/o feeling a \"shock\" and was pointing to L pacer site. Pt stated that he felt some pain associated with said \"shock\" but that the pain was gone by the time I had entered the room. Pt originally came in c/o feeling a \"shock\" from device; Dr. Servando Mora instructed pt that he has a pacer not an ICD. No changes visualized on monitor. Spoke to [place], [place] no new orders received at this time. Will continue to monitor.

## 2019-08-04 LAB
ANION GAP SERPL CALC-SCNC: 7 MMOL/L (ref 7–16)
BUN SERPL-MCNC: 19 MG/DL (ref 8–23)
CALCIUM SERPL-MCNC: 8.6 MG/DL (ref 8.3–10.4)
CHLORIDE SERPL-SCNC: 101 MMOL/L (ref 98–107)
CO2 SERPL-SCNC: 29 MMOL/L (ref 21–32)
CREAT SERPL-MCNC: 0.82 MG/DL (ref 0.8–1.5)
GLUCOSE SERPL-MCNC: 81 MG/DL (ref 65–100)
INR PPP: 1.2
POTASSIUM SERPL-SCNC: 4 MMOL/L (ref 3.5–5.1)
PROTHROMBIN TIME: 14.8 SEC (ref 11.7–14.5)
SODIUM SERPL-SCNC: 137 MMOL/L (ref 136–145)

## 2019-08-04 PROCEDURE — 80048 BASIC METABOLIC PNL TOTAL CA: CPT

## 2019-08-04 PROCEDURE — 36415 COLL VENOUS BLD VENIPUNCTURE: CPT

## 2019-08-04 PROCEDURE — 85610 PROTHROMBIN TIME: CPT

## 2019-08-04 PROCEDURE — 74011250637 HC RX REV CODE- 250/637: Performed by: INTERNAL MEDICINE

## 2019-08-04 PROCEDURE — 65660000000 HC RM CCU STEPDOWN

## 2019-08-04 RX ADMIN — Medication 10 ML: at 13:43

## 2019-08-04 RX ADMIN — SIMVASTATIN 20 MG: 20 TABLET, FILM COATED ORAL at 22:12

## 2019-08-04 RX ADMIN — CARVEDILOL 3.12 MG: 3.12 TABLET, FILM COATED ORAL at 17:16

## 2019-08-04 RX ADMIN — Medication 10 ML: at 22:12

## 2019-08-04 RX ADMIN — CARVEDILOL 3.12 MG: 3.12 TABLET, FILM COATED ORAL at 08:57

## 2019-08-04 RX ADMIN — FUROSEMIDE 20 MG: 20 TABLET ORAL at 17:16

## 2019-08-04 RX ADMIN — FUROSEMIDE 20 MG: 20 TABLET ORAL at 08:57

## 2019-08-04 RX ADMIN — Medication 10 ML: at 05:54

## 2019-08-04 RX ADMIN — WARFARIN SODIUM 5 MG: 5 TABLET ORAL at 17:16

## 2019-08-04 RX ADMIN — DOXAZOSIN 4 MG: 4 TABLET ORAL at 09:00

## 2019-08-04 RX ADMIN — ISOSORBIDE MONONITRATE 30 MG: 30 TABLET, EXTENDED RELEASE ORAL at 08:57

## 2019-08-04 RX ADMIN — LISINOPRIL 2.5 MG: 5 TABLET ORAL at 08:57

## 2019-08-04 RX ADMIN — FINASTERIDE 5 MG: 5 TABLET, FILM COATED ORAL at 08:57

## 2019-08-04 NOTE — PROGRESS NOTES
Warfarin dosing per pharmacist    Emma Riedel is a 82 y.o. male. Height: 5' 4\" (162.6 cm)    Weight: 54.3 kg (119 lb 12.8 oz)    Indication:  Aortic valve replacement    Goal INR:  2.5 to 3.0 per Dr. Lashell Billings dose:  5 mg daily    Risk factors or significant drug interactions:  N/A    Other anticoagulants:  N/A    Daily Monitoring  Date  INR     Warfarin dose HGB              Notes  7/31  2.6  ---    8/1  1.7  ---  8/2  ---  ---  12.9  8/3  1.4  5 mg  ---  8/4  1.2  5 mg  ---      Pharmacy consulted to assist in dosing warfarin in patient with history of Aortic valve replacement who presented with therapeutic INR of 2.6. Warfarin was held for RV lead revision and pocket revision procedure on 8/1/19. Warfarin resumed on 8/3/19. INR today continues to trend down in response to doses held earlier this admission. Will give 5 mg this evening. If INR does not begin to trend back up by tomorrow, might consider increasing dose for one day to help get INR trending up upwards. Daily INR for now.     Thank you,  Beatrice Gonzalez, PharmD  Clinical Pharmacist  [phone]

## 2019-08-04 NOTE — PROGRESS NOTES
Occupational Therapy Note:  Confirmed with interpreting services for  tomorrow morning.    Thanks,   Kumar Dunn OTR/L

## 2019-08-04 NOTE — PROGRESS NOTES
Occupational Therapy Note:  OT orders received, chart reviewed, and evaluation attempted. [employer] interpreting program not working at this time. Working with interpreting services to schedule  for Monday morning if [employer] program is not fixed later today. Awaiting call back to confirm. Will check back as schedule permits and patient and interpreting service are available to initiate occupational therapy evaluation.    Thank you for this consult,   Yara Sanches, OTR/L

## 2019-08-04 NOTE — PROGRESS NOTES
[facility] has requested PT/OT evals be forwarded to them by fax [phone] so they can make a decision about [place] rehab there. [facility] [place] to [facility] [phone] as requested with an update. They prefer [facility] for pt so they want to wait for their decision before considering any other bed offers. Will follow up in am and send therapy notes to [facility] as soon as OT has seen pt.

## 2019-08-04 NOTE — PROGRESS NOTES
[facility] CARDIOLOGY PROGRESS NOTE           8/4/2019 6:40 AM    Admit Date: 7/30/2019    Admit Diagnosis: Pacemaker malfunction [T82.111A]; Pacemaker malfunction [T82.111A]      Subjective:   No complaints this AM, no chest pain or shortness of breath, appropriate post op device pocket pain    Interval History: (History of pertinent interval events obtained from nursing staff)  Cardiac device placed yesterday, no events overnight, no immediate complications    ROS:  GEN:  No fever or chills  Cardiovascular:  As noted above  Pulmonary:  As noted above  Neuro:  No new focal motor or sensory loss      Objective:     Vitals:    08/03/19 2132 08/04/19 0103 08/04/19 0500 08/04/19 0843   BP: 112/55 124/53 153/69 159/80   Pulse: 65 63 68 (!) 55   Resp: 18 18 18 19   Temp: 98.1 °F (36.7 °C) 98.6 °F (37 °C) 98 °F (36.7 °C) 97.6 °F (36.4 °C)   SpO2: 96% 95% 97% 96%   Weight:   119 lb 12.8 oz (54.3 kg)    Height:           Physical Exam:  General-Well Developed, Well Nourished, No Acute Distress, Alert & Oriented x 3, appropriate mood. CV- regular rate and rhythm no MRG, left infraclavicular pocket with dressing in place, no evidence of hematoma, redness, warmth or excessive drainage  Lung- clear bilaterally  Abd- soft, nontender, nondistended  Ext- no edema bilaterally.     Current Facility-Administered Medications   Medication Dose Route Frequency    warfarin (COUMADIN) tablet 5 mg  5 mg Oral QPM    HYDROcodone-acetaminophen (NORCO) 7.5-325 mg per tablet 1 Tab  1 Tab Oral Q6H PRN    lidocaine-EPINEPHrine (XYLOCAINE) 1 %-1:100,000 injection 15 mg  1.5 mL IntraDERMal Multiple    sodium chloride (NS) flush 5-40 mL  5-40 mL IntraVENous Q8H    acetaminophen (TYLENOL) tablet 650 mg  650 mg Oral Q4H PRN    HYDROcodone-acetaminophen (NORCO) 5-325 mg per tablet 1 Tab  1 Tab Oral Q4H PRN    ondansetron (ZOFRAN) injection 4 mg  4 mg IntraVENous Q4H PRN    docusate sodium (COLACE) capsule 100 mg  100 mg Oral BID PRN    carvedilol (COREG) tablet 3.125 mg  3.125 mg Oral BID WITH MEALS    doxazosin (CARDURA) tablet 4 mg  4 mg Oral DAILY    finasteride (PROSCAR) tablet 5 mg  5 mg Oral DAILY    furosemide (LASIX) tablet 20 mg  20 mg Oral BID    isosorbide mononitrate ER (IMDUR) tablet 30 mg  30 mg Oral DAILY    lisinopril (PRINIVIL, ZESTRIL) tablet 2.5 mg  2.5 mg Oral DAILY    meclizine (ANTIVERT) tablet 25 mg  25 mg Oral TID PRN    simvastatin (ZOCOR) tablet 20 mg  20 mg Oral QHS    sodium chloride (NS) flush 5-40 mL  5-40 mL IntraVENous Q8H    sodium chloride (NS) flush 5-40 mL  5-40 mL IntraVENous PRN    morphine injection 2 mg  2 mg IntraVENous Q4H PRN     Data Review:   Recent Results (from the past 24 hour(s))   PROTHROMBIN TIME + INR    Collection Time: 08/03/19 12:47 PM   Result Value Ref Range    Prothrombin time 17.0 (H) 11.7 - 14.5 sec    INR 1.4     METABOLIC PANEL, BASIC    Collection Time: 08/04/19  5:12 AM   Result Value Ref Range    Sodium 137 136 - 145 mmol/L    Potassium 4.0 3.5 - 5.1 mmol/L    Chloride 101 98 - 107 mmol/L    CO2 29 21 - 32 mmol/L    Anion gap 7 7 - 16 mmol/L    Glucose 81 65 - 100 mg/dL    BUN 19 8 - 23 MG/DL    Creatinine 0.82 0.8 - 1.5 MG/DL    GFR est AA >60 >60 ml/min/1.73m2    GFR est non-AA >60 >60 ml/min/1.73m2    Calcium 8.6 8.3 - 10.4 MG/DL   PROTHROMBIN TIME + INR    Collection Time: 08/04/19  5:12 AM   Result Value Ref Range    Prothrombin time 14.8 (H) 11.7 - 14.5 sec    INR 1.2         EKG:  (EKG has been independently visualized by me with interpretation below)  Assessment:     Principal Problem:    Pacemaker lead malfunction (7/30/2019)    Active Problems:    Essential hypertension (1/9/2014)      Complete heart block (HCC) (7/22/2016)      S/P AVR (11/1/2016)      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  Cardiac device implantation: S/p post lead revision. Feeling well. Pacemaker site without obvious hematoma. Doing well. 2. AVR: restart warfarin, goal 2.5-3.0.   3. Dispo: pending approval at rehab. SW consult. PT consult. Ashley Johnson.  Conchita Kemp MD, [facility] Cardiac Electrophysiology  [facility] Cardiology

## 2019-08-04 NOTE — PROGRESS NOTES
Problem: Falls - Risk of  Goal: *Absence of Falls  Description  Document Kenny Pierre Fall Risk and appropriate interventions in the flowsheet. Outcome: Progressing Towards Goal  Note:   Fall Risk Interventions:  Mobility Interventions: Patient to call before getting OOB         Medication Interventions: Patient to call before getting OOB    Elimination Interventions: Call light in reach              Problem: Pressure Injury - Risk of  Goal: *Prevention of pressure injury  Description  Document Demetrio Scale and appropriate interventions in the flowsheet.   Outcome: Progressing Towards Goal  Note:   Pressure Injury Interventions:  Sensory Interventions: Check visual cues for pain, Minimize linen layers    Moisture Interventions: Assess need for specialty bed    Activity Interventions: Increase time out of bed    Mobility Interventions: HOB 30 degrees or less    Nutrition Interventions: Document food/fluid/supplement intake    Friction and Shear Interventions: HOB 30 degrees or less

## 2019-08-04 NOTE — PROGRESS NOTES
Bedside and Verbal shift change report given to Raul Segovia (oncoming nurse) by self (offgoing nurse). Report included the following information SBAR, Kardex, Intake/Output, MAR and Recent Results.

## 2019-08-05 VITALS
SYSTOLIC BLOOD PRESSURE: 109 MMHG | DIASTOLIC BLOOD PRESSURE: 56 MMHG | TEMPERATURE: 98.2 F | HEIGHT: 64 IN | BODY MASS INDEX: 20.38 KG/M2 | OXYGEN SATURATION: 98 % | RESPIRATION RATE: 17 BRPM | WEIGHT: 119.4 LBS | HEART RATE: 71 BPM

## 2019-08-05 LAB
INR PPP: 1.3
PROTHROMBIN TIME: 16 SEC (ref 11.7–14.5)

## 2019-08-05 PROCEDURE — 97110 THERAPEUTIC EXERCISES: CPT

## 2019-08-05 PROCEDURE — 97166 OT EVAL MOD COMPLEX 45 MIN: CPT

## 2019-08-05 PROCEDURE — 74011250637 HC RX REV CODE- 250/637: Performed by: INTERNAL MEDICINE

## 2019-08-05 PROCEDURE — 85610 PROTHROMBIN TIME: CPT

## 2019-08-05 PROCEDURE — 97535 SELF CARE MNGMENT TRAINING: CPT

## 2019-08-05 PROCEDURE — 36415 COLL VENOUS BLD VENIPUNCTURE: CPT

## 2019-08-05 RX ORDER — WARFARIN 7.5 MG/1
7.5 TABLET ORAL EVERY EVENING
Qty: 30 TAB | Refills: 1 | Status: SHIPPED | OUTPATIENT
Start: 2019-08-05

## 2019-08-05 RX ADMIN — DOXAZOSIN 4 MG: 4 TABLET ORAL at 08:08

## 2019-08-05 RX ADMIN — ISOSORBIDE MONONITRATE 30 MG: 30 TABLET, EXTENDED RELEASE ORAL at 08:08

## 2019-08-05 RX ADMIN — FINASTERIDE 5 MG: 5 TABLET, FILM COATED ORAL at 08:09

## 2019-08-05 RX ADMIN — FUROSEMIDE 20 MG: 20 TABLET ORAL at 08:09

## 2019-08-05 RX ADMIN — LISINOPRIL 2.5 MG: 5 TABLET ORAL at 08:09

## 2019-08-05 RX ADMIN — CARVEDILOL 3.12 MG: 3.12 TABLET, FILM COATED ORAL at 08:08

## 2019-08-05 RX ADMIN — Medication 5 ML: at 06:11

## 2019-08-05 RX ADMIN — ACETAMINOPHEN 650 MG: 325 TABLET, FILM COATED ORAL at 00:34

## 2019-08-05 NOTE — PROGRESS NOTES
Bedside and Verbal shift change report given to Shiva Killian RN (oncoming nurse) by self (offgoing nurse). Report included the following information SBAR, Kardex, MAR and Recent Results.

## 2019-08-05 NOTE — DISCHARGE INSTRUCTIONS
Patient Education        Pacemaker Placement for Heart Failure: What to Expect at [address] pacemaker for heart failure is a biventricular pacemaker (say \"lesli-jesse-TRICK-cali-lizzie\"). Treatment that uses this type of pacemaker is called cardiac resynchronization therapy (CRT). When you have heart failure, the lower chambers of your heart may not pump at the same time. The pacemaker sends painless electrical signals to your heart. These signals make the chambers pump at the same time. This can help your heart pump blood better and help you feel better. Your pacemaker may be combined with an ICD, or implantable cardioverter-defibrillator. It can control abnormal heart rhythms. This can prevent sudden death. Your chest may be sore where the doctor made the cut (incision) and put in the pacemaker. You also may have a bruise and mild swelling. These symptoms usually get better in 1 to 2 weeks. You may feel a hard ridge along the incision. This usually gets softer in the months after surgery. You may be able to see or feel the outline of the pacemaker under your skin. You will probably be able to go back to work or your usual routine 1 to 2 weeks after surgery. Pacemaker batteries usually last 5 to 15 years. Your doctor will talk to you about how often you will need to have your pacemaker checked. You'll need to take steps to safely use electric devices. Some of these devices can stop your pacemaker from working right for a short time. Check with your doctor about what to avoid and what to keep a short distance away from your pacemaker. For example, you will need to stay away from things with strong magnetic and electrical fields. An example is an MRI machine (unless your pacemaker is safe for an MRI). You can use a cell phone and other wireless devices but keep them at least 6 inches away from your pacemaker. Many household and office electronics do not affect a pacemaker.  These include kitchen appliances and computers. This care sheet gives you a general idea about how long it will take for you to recover. But each person recovers at a different pace. Follow the steps below to get better as quickly as possible. How can you care for yourself at home? Activity    · Rest when you feel tired.     · Be active. Walking is a good choice.     · For 4 to 6 weeks:  ? Avoid activities that strain your chest or upper arm muscles. This includes pushing a  or vacuum, mopping floors, swimming, or swinging a golf club or tennis racquet. ? Do not raise your arm (the one on the side of your body where the pacemaker is located) above your shoulder. ? Allow your body to heal. Don't move quickly or lift anything heavy until you are feeling better.     · Many people are able to return to work within 1 to 2 weeks after surgery.     · Ask your doctor when it is okay for you to have sex. Diet    · You can eat your normal diet. If your stomach is upset, try bland, low-fat foods like plain rice, broiled chicken, toast, and yogurt. Medicines    · Your doctor will tell you if and when you can restart your medicines. He or she will also give you instructions about taking any new medicines.     · If you take aspirin or some other blood thinner, be sure to talk to your doctor. He or she will tell you if and when to start taking this medicine again. Make sure that you understand exactly what your doctor wants you to do.     · Be safe with medicines. Read and follow all instructions on the label. ? If the doctor gave you a prescription medicine for pain, take it as prescribed. ? If you are not taking a prescription pain medicine, ask your doctor if you can take an over-the-counter medicine. ? Do not take aspirin, ibuprofen (Advil, Motrin), naproxen (Aleve), or other nonsteroidal anti-inflammatory drugs (NSAIDs) unless your doctor says it is okay.     · If your doctor prescribed antibiotics, take them as directed.  Do not stop taking them just because you feel better. You need to take the full course of antibiotics. Incision care    · If you have strips of tape on the incision, leave the tape on for a week or until it falls off.     · Keep the incision dry while it heals. Your doctor may recommend sponge baths for about 7 days, but do not get the incision wet. Your doctor will let you know when you may take showers. After a shower, pat the incision dry.     · Don't use hydrogen peroxide or alcohol on the incision, which can slow healing. You may cover the area with a gauze bandage if it oozes fluid or rubs against clothing. Change the bandage every day.     · Do not take a bath or get into a hot tub for the first 2 weeks, or until your doctor tells you it is okay. Other instructions    · Keep a medical ID card with you at all times that says you have a pacemaker. The card should include the  and model information.     · Wear medical alert jewelry stating that you have a pacemaker. You can buy this at most [employer].     · Check your pulse as directed by your doctor.     · Have your pacemaker checked as often as your doctor recommends. In some cases, this may be done over the phone or the Internet. Your doctor will give you instructions about how to do this. Follow-up care is a key part of your treatment and safety. Be sure to make and go to all appointments, and call your doctor if you are having problems. It's also a good idea to know your test results and keep a list of the medicines you take. When should you call for help? Call 911 anytime you think you may need emergency care.  For example, call if:    · You passed out (lost consciousness).     · You have trouble breathing.    Call your doctor now or seek immediate medical care if:    · You are dizzy or light-headed, or you feel like you may faint.     · You have pain that does not get better after you take pain medicine.     · You hear an alarm or feel a vibration from your pacemaker.     · You have loose stitches, or your incision comes open.     · Bright red blood has soaked through the bandage over your incision.     · You have signs of infection, such as:  ? Increased pain, swelling, warmth, or redness. ? Red streaks leading from the incision. ? Pus draining from the incision. ? A fever.    Watch closely for changes in your health, and be sure to contact your doctor if:    · You have any problems with your pacemaker. Where can you learn more? Go to http://clinton-avelino.info/. Enter T984 in the search box to learn more about \"Pacemaker Placement for Heart Failure: What to Expect at Home. \"  Current as of: July 22, 2018  Content Version: 12.1  © 1179-1695 [employer], [employer]. Care instructions adapted under license by [employer] (which disclaims liability or warranty for this information). If you have questions about a medical condition or this instruction, always ask your healthcare professional. [place] [address] any warranty or liability for your use of this information.

## 2019-08-05 NOTE — PROGRESS NOTES
[facility] CARDIOLOGY PROGRESS NOTE           8/5/2019 6:56 AM    Admit Date: 7/30/2019    Admit Diagnosis: Pacemaker malfunction [T82.111A]; Pacemaker malfunction [T82.111A]      Subjective:   No complaints this AM, no chest pain or shortness of breath    Interval History: (History of pertinent interval events obtained from nursing staff)    ROS:  GEN:  No fever or chills  Cardiovascular:  As noted above  Pulmonary:  As noted above  Neuro:  No new focal motor or sensory loss      Objective:     Vitals:    08/04/19 2058 08/05/19 0107 08/05/19 0602 08/05/19 0605   BP: 130/62 123/57  140/75   Pulse: 76 64  69   Resp: 16 16  16   Temp: 98.3 °F (36.8 °C) 97.8 °F (36.6 °C)  97.9 °F (36.6 °C)   SpO2: 93% 98%  98%   Weight:   54.2 kg (119 lb 6.4 oz)    Height:           Physical Exam:  General- elderly, frail, NAD, appropriate mood. Neck- supple, no JVD  CV- regular rate and paced rhythm, mech S2  Lung- clear bilaterally  Abd- soft, nontender, nondistended  Ext- no edema bilaterally.   Skin- warm and dry    Current Facility-Administered Medications   Medication Dose Route Frequency    warfarin (COUMADIN) tablet 5 mg  5 mg Oral QPM    HYDROcodone-acetaminophen (NORCO) 7.5-325 mg per tablet 1 Tab  1 Tab Oral Q6H PRN    lidocaine-EPINEPHrine (XYLOCAINE) 1 %-1:100,000 injection 15 mg  1.5 mL IntraDERMal Multiple    sodium chloride (NS) flush 5-40 mL  5-40 mL IntraVENous Q8H    acetaminophen (TYLENOL) tablet 650 mg  650 mg Oral Q4H PRN    HYDROcodone-acetaminophen (NORCO) 5-325 mg per tablet 1 Tab  1 Tab Oral Q4H PRN    ondansetron (ZOFRAN) injection 4 mg  4 mg IntraVENous Q4H PRN    docusate sodium (COLACE) capsule 100 mg  100 mg Oral BID PRN    carvedilol (COREG) tablet 3.125 mg  3.125 mg Oral BID WITH MEALS    doxazosin (CARDURA) tablet 4 mg  4 mg Oral DAILY    finasteride (PROSCAR) tablet 5 mg  5 mg Oral DAILY    furosemide (LASIX) tablet 20 mg  20 mg Oral BID    isosorbide mononitrate ER (IMDUR) tablet 30 mg  30 mg Oral DAILY    lisinopril (PRINIVIL, ZESTRIL) tablet 2.5 mg  2.5 mg Oral DAILY    meclizine (ANTIVERT) tablet 25 mg  25 mg Oral TID PRN    simvastatin (ZOCOR) tablet 20 mg  20 mg Oral QHS    sodium chloride (NS) flush 5-40 mL  5-40 mL IntraVENous Q8H    sodium chloride (NS) flush 5-40 mL  5-40 mL IntraVENous PRN    morphine injection 2 mg  2 mg IntraVENous Q4H PRN     Data Review:   Recent Results (from the past 24 hour(s))   PROTHROMBIN TIME + INR    Collection Time: 08/05/19  4:28 AM   Result Value Ref Range    Prothrombin time 16.0 (H) 11.7 - 14.5 sec    INR 1.3         EKG:  (EKG has been independently visualized by me with interpretation below)  Assessment:     Principal Problem:    Pacemaker lead malfunction (7/30/2019)    Active Problems:    Essential hypertension (1/9/2014)      Complete heart block ([address] (7/22/2016)      S/P AVR (11/1/2016)      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 Cardiac device implantation: S/p post lead revision. Feeling well. Pacemaker site without obvious hematoma. Doing well. 2. AVR: restart warfarin, goal 2.5-3.0, subtherapeutic this AM  3. Dispo: pending approval at rehab. SW/PT on board    Cosmo Lowery MD  Cardiology/Electrophysiology

## 2019-08-05 NOTE — PROGRESS NOTES
Warfarin dosing per pharmacist    Aimee Bell is a 82 y.o. male. Height: 5' 4\" (162.6 cm)    Weight: 54.2 kg (119 lb 6.4 oz)    Indication:  Aortic valve replacement    Goal INR:  2.5 to 3.0 per Dr. Mere Morrison dose:  5 mg daily    Risk factors or significant drug interactions:  N/A    Other anticoagulants:  N/A    Daily Monitoring  Date  INR     Warfarin dose HGB              Notes  7/31  2.6  ---    8/1  1.7  ---  8/2  ---  ---  12.9  8/3  1.4  5 mg  ---   8/4  1.2  5 mg  ---  8/5  1.3  7.5 mg  ---    Pharmacy consulted to assist in dosing warfarin in patient with history of Aortic valve replacement who presented with therapeutic INR of 2.6. INR subtherapeutic the last few days. Will increase dose to 7.5 mg and continue to monitor daily INR. Pharmacy will continue to follow. Thank you,  Sarabjit Childers, Pharm. D.  PGY1 Pharmacy Resident

## 2019-08-05 NOTE — PROGRESS NOTES
available when necessary. For on-site interpreters please call [phone]. After hours and weekends, please call the on-call number. Jose Cruz Calero  Patient [email or web address]  c: [phone] / Juan Carlos Henry Do [facility] [address] / [place], [address] [facility]  [email or web address]

## 2019-08-05 NOTE — PROGRESS NOTES
Bedside and Verbal shift change report given to self (oncoming nurse) by Loretta Chacon RN (offgoing nurse). Report included the following information SBAR, Kardex, [facility], Recent Results and Cardiac Rhythm Paced.

## 2019-08-05 NOTE — PROGRESS NOTES
Pt resting quietly in bed. Call light in reach. Pt denies any needs. Cardiac rhythm stable; pt paced on the monitor. Dressing CDI. Problem: Falls - Risk of  Goal: *Absence of Falls  Description  Document Nica Dsouza Fall Risk and appropriate interventions in the flowsheet.   Outcome: Progressing Towards Goal  Note:   Fall Risk Interventions:  Mobility Interventions: Patient to call before getting OOB         Medication Interventions: Patient to call before getting OOB, Teach patient to arise slowly    Elimination Interventions: Call light in reach, Patient to call for help with toileting needs              Problem: Pacer/ICD: Discharge Outcomes  Goal: *Hemodynamically stable  Outcome: Progressing Towards Goal  Goal: *Stable cardiac rhythm  Outcome: Progressing Towards Goal  Goal: *Lungs clear or at baseline  Outcome: Progressing Towards Goal  Goal: *Demonstrates ability to perform prescribed activity without shortness of breath or discomfort  Outcome: Progressing Towards Goal  Goal: *Verbalizes home exercise program, activity guidelines, cardiac precautions  Outcome: Progressing Towards Goal  Goal: *Verbalizes understanding and describes prescribed diet  Outcome: Progressing Towards Goal  Goal: *Verbalizes understanding and describes medication purposes and frequencies  Outcome: Progressing Towards Goal  Goal: *Identifies cardiac risk factors  Outcome: Progressing Towards Goal  Goal: *No signs and symptoms of infection or wound complications  Outcome: Progressing Towards Goal  Goal: *Anxiety reduced or absent  Outcome: Progressing Towards Goal  Goal: *Understands and describes signs and symptoms to report to providers(Stroke Metric)  Outcome: Progressing Towards Goal  Goal: *Describes follow-up/return visits to physicians  Outcome: Progressing Towards Goal  Goal: *Describes available resources and support systems  Outcome: Progressing Towards Goal  Goal: *Influenza immunization  Outcome: Progressing Towards Goal  Goal: *Pneumococcal immunization  Outcome: Progressing Towards Goal  Goal: *Optimal pain control at patient's stated goal  Outcome: Progressing Towards Goal     Problem: Pressure Injury - Risk of  Goal: *Prevention of pressure injury  Description  Document Demetrio Scale and appropriate interventions in the flowsheet.   Outcome: Progressing Towards Goal  Note:   Pressure Injury Interventions:  Sensory Interventions: Check visual cues for pain, Minimize linen layers    Moisture Interventions: Check for incontinence Q2 hours and as needed    Activity Interventions: Increase time out of bed    Mobility Interventions: HOB 30 degrees or less    Nutrition Interventions: Document food/fluid/supplement intake    Friction and Shear Interventions: HOB 30 degrees or less

## 2019-08-05 NOTE — DISCHARGE SUMMARY
Physician Discharge Summary     Patient ID:  Eulalio Nolen  [id number]  89 y.o.  1935    Admit date: 7/30/2019    Discharge date and time:     Admitting Physician: Cherie De León MD     Primary Cardiologist:Dr. Ernesto Marroquin     Primary Care MD:None    Discharge Physician: Duane Xiong NP    Admission Diagnoses: Pacemaker malfunction [T82.111A]  Pacemaker malfunction [T82.111A]    Discharge Diagnoses:   Patient Active Problem List    Diagnosis Date Noted    Pacemaker lead malfunction 07/30/2019    Biventricular cardiac pacemaker in situ 07/30/2019    Deaf 07/30/2019    Pacemaker malfunction 07/30/2019    Symptomatic bradycardia 07/13/2019    Bradycardia 07/13/2019    Hx of CABG 07/12/2019    Acute on chronic systolic congestive heart failure ([facility] [address] 07/12/2019    PND (paroxysmal nocturnal dyspnea) 05/01/2017    S/P AVR 11/01/2016    Complete heart block ([facility] [address] 07/22/2016    Syncope 07/22/2016    Second-degree heart block; MOBITZ I; WENCKEB 06/29/2016    Abnormal EKG 01/09/2014    Essential hypertension 01/09/2014    Coronary atherosclerosis of native coronary vessel 07/26/2012    Malignant neoplasm of prostate ([facility] [address] 07/26/2012    HLD (hyperlipidemia) 07/26/2012           Hospital Course: Eulalio Nolen is a 82 y.o. male with known hx of mechanical AVR since 1990, transient CHB, NICM with recent upgrade of device to BIV PM. He presents to ER today with complaints of SOB and feeling poorly. S/p BIV PM on 7/15. Interrogation of device today reveals RV lead displaced and high LV threshold. 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 ITs a PM not ICD. He was admitted for further evaluation with noted RV lead dislodgement. RV lead revision 8/1/19. He had prolonged hospital stay due to need for approval for rehab facility and recurrent mild orthostatic hypotension. His coumadin was resumed at hirer dose due to subtherapeutic INR.  This will need to be closely monitored. Patient has been instructed to keep Left arm below shoulder height for the next 4 weeks. Avoid reaching, pulling, pushing with affected arm. Call for any problems with pacemaker site including any suspicious drainage, fever of unknown origin for the next month. Keep site clean and dry applying no powders or lotions to site. No driving until seen in follow up. Keep PM dressing intact until seen in follow up. Discharge Exam:     Visit Vitals  /56   Pulse 71   Temp 98.2 °F (36.8 °C)   Resp 17   Ht 5' 4\" (1.626 m)   Wt 54.2 kg (119 lb 6.4 oz)   SpO2 98%   BMI 20.49 kg/m²     General Appearance:  Well developed, well nourished,alert and oriented x 3, and individual in no acute distress. Ears/Nose/Mouth/Throat:   Hearing grossly normal.         Neck: Supple. Chest:   Lungs clear to auscultation bilaterally. Cardiovascular:  Regular rate and rhythm, S1, S2 normal, no murmur. Abdomen:   Soft, non-tender, bowel sounds are active. Extremities: No edema bilaterally. Skin: Warm and dry. Final Laboratory Data:  Recent Results (from the past 24 hour(s))   PROTHROMBIN TIME + INR    Collection Time: 08/05/19  4:28 AM   Result Value Ref Range    Prothrombin time 16.0 (H) 11.7 - 14.5 sec    INR 1.3         Disposition: home    Patient Instructions:   Current Discharge Medication List      CONTINUE these medications which have CHANGED    Details   warfarin (COUMADIN) 7.5 mg tablet Take 1 Tab by mouth every evening. Qty: 30 Tab, Refills: 1         CONTINUE these medications which have NOT CHANGED    Details   finasteride (PROSCAR) 5 mg tablet Take 1 Tab by mouth daily. Qty: 30 Tab, Refills: 11      docusate sodium (COLACE) 100 mg capsule Take 1 Cap by mouth two (2) times daily as needed for Constipation for up to 90 days.   Qty: 60 Cap, Refills: 1      guaiFENesin (ROBITUSSIN) 100 mg/5 mL liquid Take 10 mL by mouth every six (6) hours as needed for Cough.  Qty: 1 Bottle, Refills: 0      carvedilol (COREG) 3.125 mg tablet Take 1 Tab by mouth two (2) times daily (with meals). Qty: 60 Tab, Refills: 11      lisinopril (PRINIVIL, ZESTRIL) 2.5 mg tablet Take 1 Tab by mouth daily. Qty: 30 Tab, Refills: 11      isosorbide mononitrate ER (IMDUR) 30 mg tablet Take 1 Tab by mouth daily. Qty: 90 Tab, Refills: 3      simvastatin (ZOCOR) 20 mg tablet Take 1 Tab by mouth nightly. Qty: 90 Tab, Refills: 3      furosemide (LASIX) 20 mg tablet Take 20 mg by mouth two (2) times a day. meclizine (ANTIVERT) 25 mg tablet Take  by mouth three (3) times daily as needed. doxazosin (CARDURA) 4 mg tablet Take 4 mg by mouth daily. 1/2 tab daily           Referenced discharge instructions provided by nursing for diet and activity. Follow-up:  Primary Cardiologist:[facility] Cardiology device clinic as scheduled. PCP: (None) in about 4 weeks.     Signed:  Clare Elias NP  8/5/2019  12:14 PM

## 2019-08-05 NOTE — PROGRESS NOTES
Pt is accepted to [facility] Rehab today, Rm 211W report # [id number]. Pt updated and also called his sister, Prashant Torres [phone] with update and she will see pt at [facility] tomorrow. Transport via [place] around [place] [address] prepared to go with pt. Care Management Interventions  PCP Verified by CM: Yes  Last Visit to PCP: 05/13/19  Mode of Transport at Discharge:  Other (see comment)(Brooke,[facility] Friend [phone] )  Transition of Care Consult (CM Consult): Discharge Planning  Discharge Durable Medical Equipment: No  Physical Therapy Consult: No  Occupational Therapy Consult: No  Speech Therapy Consult: No  Current Support Network: Lives with Spouse, Own Home  Confirm Follow Up Transport: Family  Plan discussed with Pt/Family/Caregiver: Yes  Freedom of Choice Offered: Yes  Discharge Location  Discharge Placement: Rehab hospital/unit acute

## 2019-08-05 NOTE — PROGRESS NOTES
Problem: Self Care Deficits Care Plan (Adult)  Goal: *Acute Goals and Plan of Care (Insert Text)  Description  1. Patient will complete total body bathing and dressing with modified independence and adaptive equipment as needed. 2. Patient will complete self-feeding/grooming with modified independence and adaptive equipment as needed. 3. Patient will demonstrate compliance with LUE pacemaker precautions with 100% accuracy during ADLs. 4. Patient will complete functional transfers with supervision and adaptive equipment as needed. 5. Patient will demonstrate modified independence with therapeutic exercise HEP to increase strength and functional range of motion in LUE for increased safety and independence with functional tasks. 6. Patient will complete functional mobility for ADLs with supervision and adaptive equipment as needed. 7. Patient will don/doff LUE sling with modified independence and no cueing from therapist.     Timeframe: 7 visits      Outcome: Progressing Towards Goal     OCCUPATIONAL THERAPY: Initial Assessment, Daily Note and AM 8/5/2019  INPATIENT: OT Visit Days: 1  [place] / [place] B / Product Type: Medicare /      NAME/AGE/GENDER: Valdo Beck is a 82 y.o. male   PRIMARY DIAGNOSIS:  Pacemaker malfunction [T82.111A]  Pacemaker malfunction [T82.111A] Pacemaker lead malfunction   Pacemaker lead malfunction    Procedure(s) (LRB):  Lead Revision (N/A)  4 Days Post-Op  ICD-10: Treatment Diagnosis:    · Pain in Left Shoulder (M25.512)  · Stiffness of Left Shoulder, Not elsewhere classified (M25.612)  · Generalized Muscle Weakness (M62.81)  · Other lack of cordination (R27.8)  · History of falling (Z91.81)   Precautions/Allergies:    Fall precautions    Pacemaker precautions   Procaine      ASSESSMENT:     Mr. Tony Rivas is a 82 y.o. male admitted with pacemaker lead malfunction, s/p lead revision, now with pacemaker precautions.  Pt is deaf and requires ASL , which was present for today's evaluation & treatment. At baseline pt lives with spouse and reports independence with ADLs, ambulation, and yard work. 2 recent falls per chart. Pt is RHD. Upon arrival pt alert and agreeable to OT evaluation and treatment. Pt educated on pacemaker precautions with LUE. BUE assessment revealed RUE AROM and strength generally decreased, sensation intact in BUEs, L shoulder AROM, PROM, and strength grossly decreased. Pt's LUE presents with decreased shoulder flexion to ~10*, external rotation to ~80*, and forearm supination. Pt completed bed mobility with SBA/additional time. Treatment initiated to include functional transfers with CGA, ambulation with CGA, toileting with SBA and grooming in standing with CGA/additional time. Pt practiced donning/doffing sling with Saba/cueing, using mirror for visual feedback. Pt practiced BUE exercises with cueing for technique. See below. LUE notable stiff with audible popping throughout gentle PROM. Pt left seated in chair with call bell within reach. Pt presents with deficits in strength, AROM, PROM, activity tolerance, balance, functional mobility and functional transfers, all of which negatively impact safety and independence with ADLs. Marino Harmon is currently functioning below baseline and would benefit from continued OT to increase safety and independence with ADLs. Will follow. This section established at most recent assessment   PROBLEM LIST (Impairments causing functional limitations):  1. Decreased Strength  2. Decreased ADL/Functional Activities  3. Decreased Transfer Abilities  4. Decreased Ambulation Ability/Technique  5. Decreased Balance  6. Increased Pain  7. Decreased Activity Tolerance  8. Decreased Flexibility/Joint Mobility  9. Decreased Knowledge of Precautions  10.  Decreased [place] with Home Exercise Program   INTERVENTIONS PLANNED: (Benefits and precautions of occupational therapy have been discussed with the patient.)  1. Activities of daily living training  2. Adaptive equipment training  3. Balance training  4. Clothing management  5. Donning&doffing training  6. Hygiene training  7. Re-evaluation  8. Therapeutic activity  9. Therapeutic exercise     TREATMENT PLAN: Frequency/Duration: Follow patient 3x/week to address above goals. Rehabilitation Potential For Stated Goals: Good     REHAB RECOMMENDATIONS (at time of discharge pending progress):    Placement: It is my opinion, based on this patient's performance to date, that Mr. Claude Brooking may benefit from intensive therapy at an [address] [place] [address] after discharge due to a probable need for multiple therapy disciplines and potential to make ongoing and sustainable functional improvement that is of practical value. .  Equipment:    TBD               OCCUPATIONAL PROFILE AND HISTORY:   History of Present Injury/Illness (Reason for Referral):  See H&P. Past Medical History/Comorbidities:   Mr. Claude Brooking  has a past medical history of Abnormal EKG (2014), Coronary atherosclerosis of native coronary vessel (2012), Essential hypertension (2014), H/O prosthetic aortic valve replacement, HLD (hyperlipidemia) (2012), Malignant neoplasm of prostate ([facility] [address] (2012), PND (paroxysmal nocturnal dyspnea) (2017), S/P AVR (2016), Second-degree heart block; MOBITZ I; Charma Billy (2016), Slow pulse, and Valvular heart disease. Mr. Claude Brooking  has a past surgical history that includes hx coronary artery bypass graft; hx aortic valve replacement (1990); and hx heart valve surgery.   Social History/Living Environment:   Home Environment: Trailer/mobile home  # Steps to Enter: 4  Rails to Enter: Yes  [employer] : Bilateral  One/Two Story Residence: One story  Living Alone: No  Support Systems: Spouse/Significant Other/Partner  Patient Expects to be Discharged to[de-identified] Rehabilitation facility  Current DME Used/Available at Home: None  Tub or Shower Type: Tub/Shower combination  Prior Level of Function/Work/Activity:  Pt is deaf and requires , which was present for today's evaluation & treatment. At baseline pt lives with spouse and reports independence with ADLs, ambulation, and yard work. 2 recent falls per chart. Pt is RHD. Dominant Side:         RIGHT    Personal Factors:          Sex:  male        Age:  82 y.o. Other factors that influence how disability is experienced by the patient:  Multiple co-morbidities, recent falls    Number of Personal Factors/Comorbidities that affect the Plan of Care: Expanded review of therapy/medical records (1-2):  MODERATE COMPLEXITY   ASSESSMENT OF OCCUPATIONAL PERFORMANCE[de-identified]   Activities of Daily Living:   Basic ADLs (From Assessment) Complex ADLs (From Assessment)   Feeding: Minimum assistance  Oral Facial Hygiene/Grooming: Minimum assistance  Bathing: Minimum assistance  Upper Body Dressing: Moderate assistance  Lower Body Dressing: Moderate assistance  Toileting: Stand by assistance Instrumental ADL  Meal Preparation: Total assistance  Homemaking: Total assistance   Grooming/Bathing/Dressing Activities of Daily Living   Grooming  Washing Hands: Contact guard assistance  Brushing Teeth: Contact guard assistance Cognitive Retraining  Safety/Judgement: Awareness of environment; Fall prevention           Toileting  Toileting Assistance: Stand-by assistance  Bladder Hygiene: Stand-by assistance     Functional Transfers  Bathroom Mobility: Contact guard assistance  Toilet Transfer : Contact guard assistance     Bed/Mat Mobility  Supine to Sit: Stand-by assistance  Sit to Stand: Contact guard assistance;Stand-by assistance  Stand to Sit: Contact guard assistance  Bed to Chair: Contact guard assistance  Scooting: Stand-by assistance; Additional time     Most Recent Physical Functioning:   Gross Assessment:  AROM: Grossly decreased, non-functional(L shoulder)  PROM: Grossly decreased, non-functional(L shoulder)  Strength: Grossly decreased, non-functional(L shoulder)  Coordination: Within functional limits(B hands)  Sensation: Intact(BUEs to light touch)               Posture:  Posture (WDL): Exceptions to WDL  Posture Assessment: Forward head, Rounded shoulders  Balance:  Sitting: Intact  Standing: Impaired  Standing - Static: Good  Standing - Dynamic : Fair Bed Mobility:  Supine to Sit: Stand-by assistance  Scooting: Stand-by assistance; Additional time  Wheelchair Mobility:     Transfers:  Sit to Stand: Contact guard assistance;Stand-by assistance  Stand to Sit: Contact guard assistance  Bed to Chair: Contact guard assistance            Patient Vitals for the past 6 hrs:   BP BP Patient Position SpO2 Pulse   19 0605 140/75 At rest 98 % 69   19 0724    68   19 0801 155/78  97 % 65       Mental Status  Neurologic State: Alert  Orientation Level: Appropriate for age, Oriented to person, Oriented to situation, Oriented to place  Cognition: Appropriate for age attention/concentration, Follows commands  Perception: Appears intact  Perseveration: No perseveration noted  Safety/Judgement: Awareness of environment, Fall prevention                          Physical Skills Involved:  1. Range of Motion  2. Balance  3. Strength  4. Activity Tolerance  5. Gross Motor Control  6. Pain (acute)  7. Pain (Chronic) Cognitive Skills Affected (resulting in the inability to perform in a timely and safe manner):  1. None  Psychosocial Skills Affected:  1. Habits/Routines  2. Environmental Adaptation  3. Social Interaction  4. Emotional Regulation  5. Self-Awareness  6. Social Roles   Number of elements that affect the Plan of Care: 5+:  HIGH COMPLEXITY   CLINICAL DECISION MAKIN [facility] [address] [address] AM-PAC 6 Clicks   Daily Activity Inpatient Short Form  How much help from another person does the patient currently need. .. Total A Lot A Little None   1.   Putting on and taking off regular lower body clothing? ? 1   ? 2   ? 3   ? 4   2. Bathing (including washing, rinsing, drying)? ? 1   ? 2   ? 3   ? 4   3. Toileting, which includes using toilet, bedpan or urinal?   ? 1   ? 2   ? 3   ? 4   4. Putting on and taking off regular upper body clothing? ? 1   ? 2   ? 3   ? 4   5. Taking care of personal grooming such as brushing teeth? ? 1   ? 2   ? 3   ? 4   6. Eating meals? ? 1   ? 2   ? 3   ? 4   © 2007, Trustees of [address] [place] [address] [address] [id number], under license to [employer]. All rights reserved      Score:  Initial: 16 8/5/19 Most Recent: X (Date: -- )    Interpretation of Tool:  Represents activities that are increasingly more difficult (i.e. Bed mobility, Transfers, Gait). Medical Necessity:     · Patient demonstrates good  ·  rehab potential due to higher previous functional level. Reason for Services/Other Comments:  · Patient continues to require skilled intervention due to inability to complete ADLs at prior level of independence   · . Use of outcome tool(s) and clinical judgement create a POC that gives a: MODERATE COMPLEXITY         TREATMENT:   (In addition to Assessment/Re-Assessment sessions the following treatments were rendered)     Pre-treatment Symptoms/Complaints:    Pain: Initial:   Pain Intensity 1: (Pt reports little pain in R knee, does not quantify)  Pain Location 1: Knee  Pain Orientation 1: Right  Pain Intervention(s) 1: Ambulation/Increased Activity, Repositioned  Post Session:  same     Self Care: (15 minutes): Procedure(s) (per grid) utilized to improve and/or restore self-care/home management as related to dressing, toileting and grooming. Required minimal visual and manual cueing to facilitate activities of daily living skills and compensatory activities. Treatment initiated to include functional transfers with CGA, ambulation for ADLs with CGA, toileting with SBA and grooming in standing with CGA/additional time.  Pt practiced donning/doffing sling with Saba/cueing, using mirror for visual feedback. Therapeutic Exercise: ( 23 minutes):  Exercises per grid below to improve mobility, strength, coordination and dynamic movement of arm - left to improve functional reaching and functional movement . Required moderate visual and manual cues to promote proper body alignment, promote proper body posture and promote proper body mechanics. Progressed repetitions as indicated. Pt practiced BUE exercises with cueing for technique. See below. LUE notable stiff with audible popping throughout gentle PROM. Date:  8/5/19 Date:   Date:     Activity/Exercise Parameters Parameters Parameters   LUE PROM shoulder flexion to ~30* x20     LUE PROM shoulder external rotation to ~90* x20     LUE AROM pronation/supination x20     LUE AROM wrist flexion/extension x15     LUE AROM elbow flexion/extension x20                     Braces/Orthotics/Lines/Etc:   · Telemetry   · O2 Device: Room air  Treatment/Session Assessment:    · Response to Treatment:  Tolerated well   · Interdisciplinary Collaboration:   o Occupational Therapist  o Registered Nurse  o    · After treatment position/precautions:   o Up in chair  o Bed/Chair-wheels locked  o Bed in low position  o Call light within reach  o RN notified  o Needs within reach    o RN notified pt does not have chair alarm   · Compliance with Program/Exercises: Compliant all of the time, Will assess as treatment progresses. · Recommendations/Intent for next treatment session: \"Next visit will focus on advancements to more challenging activities and reduction in assistance provided\".   Total Treatment Duration:  OT Patient Time In/Time Out  Time In: 0826  Time Out: 0926     DEON Shannon/ERIC

## 2019-08-05 NOTE — PROGRESS NOTES
Bedside and Verbal shift change report given to self (oncoming nurse) by Christine Pagan RN (offgoing nurse). Report included the following information SBAR, Kardex, MAR and Recent Results.

## 2019-08-05 NOTE — PROGRESS NOTES
TRANSFER - OUT REPORT:    Verbal report given to MACHO Longo on Odette Choi  being transferred to [facility] room 201W for routine progression of care       Report consisted of patients Situation, Background, Assessment and Recommendations(SBAR). Information from the following report(s) SBAR, Kardex, [facility], Recent Results and Cardiac Rhythm Paced was reviewed with the receiving nurse. Opportunity for questions and clarification was provided.

## 2019-08-06 ENCOUNTER — PATIENT OUTREACH (OUTPATIENT)
Dept: CASE MANAGEMENT | Age: 84
End: 2019-08-06

## 2019-08-06 NOTE — PROGRESS NOTES
This note will not be viewable in [address] Patient discharged to [facility] on 8/5/19. [facility] outreach postponed for 21 days due to discharge to non-preferred network SNF.

## 2019-08-27 ENCOUNTER — PATIENT OUTREACH (OUTPATIENT)
Dept: CASE MANAGEMENT | Age: 84
End: 2019-08-27

## 2020-08-28 NOTE — PROGRESS NOTES
This note will not be viewable in [address] Attempted outreach follow up, verified with staff at [facility] patient discharged home on 8/12/19. Patient attended device check follow up on 8/9/19. Next visit 11/22/19.   Patient is graduated from [employer] program. Additional Complaints

## 2022-03-18 PROBLEM — I50.23 ACUTE ON CHRONIC SYSTOLIC CONGESTIVE HEART FAILURE (HCC): Status: ACTIVE | Noted: 2019-07-12

## 2022-03-19 PROBLEM — R06.00 PND (PAROXYSMAL NOCTURNAL DYSPNEA): Status: ACTIVE | Noted: 2017-05-01

## 2022-03-19 PROBLEM — Z95.1 HX OF CABG: Status: ACTIVE | Noted: 2019-07-12

## 2022-03-19 PROBLEM — T82.110A PACEMAKER LEAD MALFUNCTION: Status: ACTIVE | Noted: 2019-07-30

## 2022-03-19 PROBLEM — T82.111A PACEMAKER MALFUNCTION: Status: ACTIVE | Noted: 2019-07-30

## 2022-03-19 PROBLEM — R00.1 BRADYCARDIA: Status: ACTIVE | Noted: 2019-07-13

## 2022-03-19 PROBLEM — H91.90 DEAF: Status: ACTIVE | Noted: 2019-07-30

## 2022-03-20 PROBLEM — R00.1 SYMPTOMATIC BRADYCARDIA: Status: ACTIVE | Noted: 2019-07-13

## 2022-03-20 PROBLEM — Z95.0 BIVENTRICULAR CARDIAC PACEMAKER IN SITU: Status: ACTIVE | Noted: 2019-07-30

## 2022-09-27 ENCOUNTER — OFFICE VISIT (OUTPATIENT)
Dept: CARDIOLOGY CLINIC | Age: 87
End: 2022-09-27
Payer: MEDICARE

## 2022-09-27 VITALS
HEART RATE: 72 BPM | SYSTOLIC BLOOD PRESSURE: 138 MMHG | HEIGHT: 64 IN | DIASTOLIC BLOOD PRESSURE: 70 MMHG | BODY MASS INDEX: 24.07 KG/M2 | WEIGHT: 141 LBS

## 2022-09-27 DIAGNOSIS — Z95.2 S/P AVR: ICD-10-CM

## 2022-09-27 DIAGNOSIS — Z95.0 BIVENTRICULAR CARDIAC PACEMAKER IN SITU: Primary | ICD-10-CM

## 2022-09-27 DIAGNOSIS — I25.811 ATHEROSCLEROSIS OF NATIVE CORONARY ARTERY OF TRANSPLANTED HEART WITHOUT ANGINA PECTORIS: ICD-10-CM

## 2022-09-27 DIAGNOSIS — H91.90 DEAFNESS, UNSPECIFIED LATERALITY: ICD-10-CM

## 2022-09-27 DIAGNOSIS — I10 ESSENTIAL HYPERTENSION: ICD-10-CM

## 2022-09-27 DIAGNOSIS — I47.29 NSVT (NONSUSTAINED VENTRICULAR TACHYCARDIA): ICD-10-CM

## 2022-09-27 PROCEDURE — G8427 DOCREV CUR MEDS BY ELIG CLIN: HCPCS | Performed by: INTERNAL MEDICINE

## 2022-09-27 PROCEDURE — 1123F ACP DISCUSS/DSCN MKR DOCD: CPT | Performed by: INTERNAL MEDICINE

## 2022-09-27 PROCEDURE — 1036F TOBACCO NON-USER: CPT | Performed by: INTERNAL MEDICINE

## 2022-09-27 PROCEDURE — G8420 CALC BMI NORM PARAMETERS: HCPCS | Performed by: INTERNAL MEDICINE

## 2022-09-27 PROCEDURE — 99213 OFFICE O/P EST LOW 20 MIN: CPT | Performed by: INTERNAL MEDICINE

## 2022-09-27 RX ORDER — TAMSULOSIN HYDROCHLORIDE 0.4 MG/1
0.4 CAPSULE ORAL DAILY
COMMUNITY

## 2022-09-27 RX ORDER — HYDROCODONE BITARTRATE AND ACETAMINOPHEN 5; 325 MG/1; MG/1
1 TABLET ORAL EVERY 6 HOURS PRN
COMMUNITY

## 2022-09-27 ASSESSMENT — ENCOUNTER SYMPTOMS
SPUTUM PRODUCTION: 0
WHEEZING: 0
BLURRED VISION: 0
COLOR CHANGE: 0
BOWEL INCONTINENCE: 0
ORTHOPNEA: 0
ABDOMINAL PAIN: 0
SHORTNESS OF BREATH: 0
HOARSE VOICE: 0
HEMATEMESIS: 0
DIARRHEA: 0
HEMATOCHEZIA: 0

## 2022-09-27 NOTE — PROGRESS NOTES
[facility] CARDIOLOGY  [address], [address]  PHONE: [phone]        22        NAME:  Lisa Morales  : 1935  MRN: [id number]       SUBJECTIVE:   Lisa Morales is a 82 y.o. male seen for a follow up visit regarding the following: The patient has a hx of CAD,AVR,biventricular PPM,HF,and deafness. He returns for follow up and device interrogation. Pacemaker interrogation reports normal pacer function. with an 8 second episode of NSVT noted in . Megan Paul reports doing well. He admits to a fall in  that resulted in 4 sutures above his right eyebrow. I discussed NSVT on pacer interrogation. I recommended starting BB or Amiodarone. The patient declines the addition of any medications. His exam and discussion was assistance by a . Chief Complaint   Patient presents with    Coronary Artery Disease    Hypertension       HPI:    Coronary Artery Disease  Pertinent negatives include no chest pain, leg swelling, palpitations or shortness of breath. Hypertension  This is a chronic problem. The problem is unchanged. The problem is controlled. Pertinent negatives include no anxiety, blurred vision, chest pain, headaches, malaise/fatigue, neck pain, orthopnea, palpitations, peripheral edema, PND, shortness of breath or sweats. Past Medical History, Past Surgical History, Family history, Social History, and Medications were all reviewed with the patient today and updated as necessary.          Current Outpatient Medications:     tamsulosin (FLOMAX) 0.4 MG capsule, Take 0.4 mg by mouth daily, Disp: , Rfl:     HYDROcodone-acetaminophen (NORCO) 5-325 MG per tablet, Take 1 tablet by mouth every 6 hours as needed for Pain., Disp: , Rfl:     atorvastatin (LIPITOR) 40 MG tablet, Take 40 mg by mouth daily, Disp: , Rfl:     diclofenac sodium (VOLTAREN) 1 % GEL, Apply topically 2 times daily, Disp: , Rfl:     finasteride (PROSCAR) 5 MG tablet, Take 5 mg by mouth daily, Disp: , Rfl:     meclizine (ANTIVERT) 25 MG tablet, Take by mouth 3 times daily as needed, Disp: , Rfl:     warfarin (COUMADIN) 7.5 MG tablet, Take 7.5 mg by mouth every evening, Disp: , Rfl:   Allergies   Allergen Reactions    Procaine Other (See Comments)     Past Medical History:   Diagnosis Date    Abnormal EKG 2014    Coronary atherosclerosis of native coronary vessel 2012    Essential hypertension 2014    H/O prosthetic aortic valve replacement     HLD (hyperlipidemia) 2012    Malignant neoplasm of prostate ([facility] [address] 2012    Pacemaker     PND (paroxysmal nocturnal dyspnea) 2017    S/P AVR 2016    Second-degree heart block 2016    Slow pulse     Valvular heart disease      Past Surgical History:   Procedure Laterality Date    AORTIC VALVE REPLACEMENT  1990    at [address] [place] [address]       History reviewed. No pertinent family history. Social History     Tobacco Use    Smoking status: Former     Packs/day: 1.00     Types: Cigarettes     Quit date: 1988     Years since quittin.2    Smokeless tobacco: Never    Tobacco comments:     Quit smoking: pack every 3-4 days   Substance Use Topics    Alcohol use: No       ROS:    Review of Systems   Constitutional: Negative for chills, decreased appetite, diaphoresis, fever and malaise/fatigue. HENT:  Negative for congestion, hearing loss, hoarse voice and nosebleeds. Eyes:  Negative for blurred vision. Cardiovascular:  Negative for chest pain, claudication, cyanosis, dyspnea on exertion, irregular heartbeat, leg swelling, near-syncope, orthopnea, palpitations, paroxysmal nocturnal dyspnea and syncope. Respiratory:  Negative for shortness of breath, sputum production and wheezing. Endocrine: Negative for polydipsia, polyphagia and polyuria. Skin:  Negative for color change. Musculoskeletal:  Positive for falls.  Negative for neck pain.   Gastrointestinal:  Negative for abdominal pain, bowel incontinence, diarrhea, hematemesis and hematochezia. Genitourinary:  Negative for dysuria, frequency and hematuria. Neurological:  Negative for focal weakness, headaches, light-headedness, loss of balance, numbness, sensory change and weakness. Psychiatric/Behavioral:  Negative for altered mental status and memory loss. PHYSICAL EXAM:   /70   Pulse 72   Ht 5' 4\" (1.626 m)   Wt 141 lb (64 kg)   BMI 24.20 kg/m²      Physical Exam  Constitutional:       Appearance: Normal appearance. HENT:      Head: Normocephalic and atraumatic. Nose: Nose normal.   Eyes:      Extraocular Movements: Extraocular movements intact. Pupils: Pupils are equal, round, and reactive to light. Neck:      Vascular: No carotid bruit. Cardiovascular:      Rate and Rhythm: Regular rhythm. Pulses: Normal pulses. Heart sounds: No murmur heard. Pulmonary:      Effort: Pulmonary effort is normal.      Breath sounds: Normal breath sounds. Abdominal:      General: Abdomen is flat. Bowel sounds are normal.      Palpations: Abdomen is soft. Musculoskeletal:         General: Normal range of motion. Cervical back: Normal range of motion and neck supple. Skin:     General: Skin is warm and dry. Neurological:      General: No focal deficit present. Mental Status: He is alert and oriented to person, place, and time. Psychiatric:         Mood and Affect: Mood normal.       Medical problems and test results were reviewed with the patient today. No results found for this or any previous visit (from the past 672 hour(s)). Lab Results   Component Value Date/Time    CHOL 109 07/14/2019 02:35 AM    HDL 44 07/14/2019 02:35 AM     No results found for any visits on 09/27/22. ASSESSMENT and PLAN    Ashley Pardo was seen today for coronary artery disease and hypertension.     Diagnoses and all orders for this

## 2022-09-30 PROCEDURE — 93296 REM INTERROG EVL PM/IDS: CPT | Performed by: INTERNAL MEDICINE

## 2022-09-30 PROCEDURE — 93294 REM INTERROG EVL PM/LDLS PM: CPT | Performed by: INTERNAL MEDICINE

## 2022-10-28 DIAGNOSIS — I50.23 ACUTE ON CHRONIC SYSTOLIC CONGESTIVE HEART FAILURE (HCC): ICD-10-CM

## 2022-10-28 DIAGNOSIS — Z95.0 BIVENTRICULAR CARDIAC PACEMAKER IN SITU: ICD-10-CM

## 2022-10-28 DIAGNOSIS — I44.2 COMPLETE HEART BLOCK (HCC): ICD-10-CM

## 2022-10-28 DIAGNOSIS — I44.2 COMPLETE HEART BLOCK (HCC): Primary | ICD-10-CM

## 2023-02-05 NOTE — PROGRESS NOTES
Verbal bedside report given to Kumar Dobson oncoming RN. Patient's situation, background, assessment and recommendations provided. Opportunity for questions provided. Oncoming RN assumed care of patient. Bed alarm on. Zoll at bedside. no...

## 2023-02-06 DIAGNOSIS — I50.23 ACUTE ON CHRONIC SYSTOLIC CONGESTIVE HEART FAILURE (HCC): ICD-10-CM

## 2023-02-06 DIAGNOSIS — I44.2 COMPLETE HEART BLOCK (HCC): ICD-10-CM

## 2023-02-06 DIAGNOSIS — Z95.0 BIVENTRICULAR CARDIAC PACEMAKER IN SITU: ICD-10-CM

## 2023-03-28 ENCOUNTER — OFFICE VISIT (OUTPATIENT)
Dept: CARDIOLOGY CLINIC | Age: 88
End: 2023-03-28
Payer: MEDICARE

## 2023-03-28 VITALS
SYSTOLIC BLOOD PRESSURE: 138 MMHG | BODY MASS INDEX: 25.69 KG/M2 | DIASTOLIC BLOOD PRESSURE: 64 MMHG | WEIGHT: 145 LBS | HEIGHT: 63 IN | HEART RATE: 64 BPM

## 2023-03-28 DIAGNOSIS — I25.811 ATHEROSCLEROSIS OF NATIVE CORONARY ARTERY OF TRANSPLANTED HEART WITHOUT ANGINA PECTORIS: Primary | ICD-10-CM

## 2023-03-28 DIAGNOSIS — Z95.2 S/P AVR: ICD-10-CM

## 2023-03-28 DIAGNOSIS — Z95.0 BIVENTRICULAR CARDIAC PACEMAKER IN SITU: ICD-10-CM

## 2023-03-28 PROCEDURE — G8484 FLU IMMUNIZE NO ADMIN: HCPCS | Performed by: INTERNAL MEDICINE

## 2023-03-28 PROCEDURE — G8427 DOCREV CUR MEDS BY ELIG CLIN: HCPCS | Performed by: INTERNAL MEDICINE

## 2023-03-28 PROCEDURE — 1123F ACP DISCUSS/DSCN MKR DOCD: CPT | Performed by: INTERNAL MEDICINE

## 2023-03-28 PROCEDURE — 99214 OFFICE O/P EST MOD 30 MIN: CPT | Performed by: INTERNAL MEDICINE

## 2023-03-28 PROCEDURE — 3075F SYST BP GE 130 - 139MM HG: CPT | Performed by: INTERNAL MEDICINE

## 2023-03-28 PROCEDURE — 1036F TOBACCO NON-USER: CPT | Performed by: INTERNAL MEDICINE

## 2023-03-28 PROCEDURE — 3078F DIAST BP <80 MM HG: CPT | Performed by: INTERNAL MEDICINE

## 2023-03-28 PROCEDURE — G8417 CALC BMI ABV UP PARAM F/U: HCPCS | Performed by: INTERNAL MEDICINE

## 2023-03-28 RX ORDER — LOSARTAN POTASSIUM 25 MG/1
25 TABLET ORAL DAILY
COMMUNITY

## 2023-03-28 ASSESSMENT — ENCOUNTER SYMPTOMS
DIARRHEA: 0
SPUTUM PRODUCTION: 0
WHEEZING: 0
BOWEL INCONTINENCE: 0
HOARSE VOICE: 0
COLOR CHANGE: 0
ABDOMINAL PAIN: 0
CHEST TIGHTNESS: 0
SHORTNESS OF BREATH: 0
BLURRED VISION: 0
HEMATOCHEZIA: 0
ORTHOPNEA: 0
HEMATEMESIS: 0

## 2023-03-28 NOTE — PROGRESS NOTES
[facility] CARDIOLOGY  [address], [address]  PHONE: [phone]        23        NAME:  Arturo Roca  : 1935  MRN: [id number]       SUBJECTIVE:   Arturo Roca is a 82 y.o. male seen for a follow up visit regarding the following: The patient has a hx of CAD,AVR,CHF,biventricular pacemaker,and deafness. He returns for scheduled follow up. Pastora Leija reports doing well. He reports doing well except concerned with right sided gum swelling. I  recommended dentist evaluation especially with AVR to prevent future endocarditis. Exam and interview were assisted via . Chief Complaint   Patient presents with    Coronary Artery Disease    Hypertension    Other     pacer       HPI:    Coronary Artery Disease  Presents for follow-up visit. Pertinent negatives include no chest pain, chest pressure, chest tightness, dizziness, leg swelling, muscle weakness, palpitations, shortness of breath or weight gain. The symptoms have been stable. Past Medical History, Past Surgical History, Family history, Social History, and Medications were all reviewed with the patient today and updated as necessary. Current Outpatient Medications:     losartan (COZAAR) 25 MG tablet, Take 25 mg by mouth daily, Disp: , Rfl:     tamsulosin (FLOMAX) 0.4 MG capsule, Take 0.4 mg by mouth daily, Disp: , Rfl:     atorvastatin (LIPITOR) 40 MG tablet, Take 40 mg by mouth daily, Disp: , Rfl:     finasteride (PROSCAR) 5 MG tablet, Take 5 mg by mouth daily, Disp: , Rfl:     meclizine (ANTIVERT) 25 MG tablet, Take by mouth 3 times daily as needed, Disp: , Rfl:     warfarin (COUMADIN) 7.5 MG tablet, Take 7.5 mg by mouth every evening, Disp: , Rfl:     HYDROcodone-acetaminophen (NORCO) 5-325 MG per tablet, Take 1 tablet by mouth every 6 hours as needed for Pain.  (Patient not taking: Reported on 3/28/2023), Disp: , Rfl:     diclofenac sodium (VOLTAREN) 1 % GEL, Apply

## 2023-05-19 DIAGNOSIS — Z95.0 BIVENTRICULAR CARDIAC PACEMAKER IN SITU: ICD-10-CM

## 2023-05-19 DIAGNOSIS — I44.2 COMPLETE HEART BLOCK (HCC): ICD-10-CM

## 2023-05-19 DIAGNOSIS — I50.23 ACUTE ON CHRONIC SYSTOLIC CONGESTIVE HEART FAILURE (HCC): ICD-10-CM

## 2023-07-06 PROCEDURE — 93296 REM INTERROG EVL PM/IDS: CPT | Performed by: INTERNAL MEDICINE

## 2023-07-06 PROCEDURE — 93294 REM INTERROG EVL PM/LDLS PM: CPT | Performed by: INTERNAL MEDICINE

## 2023-10-13 PROCEDURE — 93294 REM INTERROG EVL PM/LDLS PM: CPT | Performed by: INTERNAL MEDICINE

## 2023-10-13 PROCEDURE — 93296 REM INTERROG EVL PM/IDS: CPT | Performed by: INTERNAL MEDICINE

## 2023-10-24 ENCOUNTER — OFFICE VISIT (OUTPATIENT)
Age: 88
End: 2023-10-24
Payer: MEDICARE

## 2023-10-24 ENCOUNTER — NURSE ONLY (OUTPATIENT)
Age: 88
End: 2023-10-24

## 2023-10-24 VITALS
WEIGHT: 134 LBS | SYSTOLIC BLOOD PRESSURE: 134 MMHG | DIASTOLIC BLOOD PRESSURE: 70 MMHG | HEIGHT: 63 IN | HEART RATE: 70 BPM | BODY MASS INDEX: 23.74 KG/M2

## 2023-10-24 DIAGNOSIS — Z95.2 S/P AVR: ICD-10-CM

## 2023-10-24 DIAGNOSIS — I44.2 COMPLETE HEART BLOCK (HCC): Primary | ICD-10-CM

## 2023-10-24 DIAGNOSIS — E78.5 HYPERLIPIDEMIA, UNSPECIFIED HYPERLIPIDEMIA TYPE: ICD-10-CM

## 2023-10-24 DIAGNOSIS — I10 ESSENTIAL HYPERTENSION: ICD-10-CM

## 2023-10-24 DIAGNOSIS — Z95.1 HX OF CABG: ICD-10-CM

## 2023-10-24 DIAGNOSIS — Z95.0 BIVENTRICULAR CARDIAC PACEMAKER IN SITU: ICD-10-CM

## 2023-10-24 DIAGNOSIS — I50.23 ACUTE ON CHRONIC SYSTOLIC CONGESTIVE HEART FAILURE (HCC): Primary | ICD-10-CM

## 2023-10-24 DIAGNOSIS — I25.811 ATHEROSCLEROSIS OF NATIVE CORONARY ARTERY OF TRANSPLANTED HEART WITHOUT ANGINA PECTORIS: ICD-10-CM

## 2023-10-24 PROCEDURE — 99213 OFFICE O/P EST LOW 20 MIN: CPT | Performed by: INTERNAL MEDICINE

## 2023-10-24 PROCEDURE — G8420 CALC BMI NORM PARAMETERS: HCPCS | Performed by: INTERNAL MEDICINE

## 2023-10-24 PROCEDURE — G8427 DOCREV CUR MEDS BY ELIG CLIN: HCPCS | Performed by: INTERNAL MEDICINE

## 2023-10-24 PROCEDURE — G8484 FLU IMMUNIZE NO ADMIN: HCPCS | Performed by: INTERNAL MEDICINE

## 2023-10-24 PROCEDURE — 3075F SYST BP GE 130 - 139MM HG: CPT | Performed by: INTERNAL MEDICINE

## 2023-10-24 PROCEDURE — 1036F TOBACCO NON-USER: CPT | Performed by: INTERNAL MEDICINE

## 2023-10-24 PROCEDURE — 1123F ACP DISCUSS/DSCN MKR DOCD: CPT | Performed by: INTERNAL MEDICINE

## 2023-10-24 PROCEDURE — 3078F DIAST BP <80 MM HG: CPT | Performed by: INTERNAL MEDICINE

## 2023-10-24 RX ORDER — ATORVASTATIN CALCIUM 40 MG/1
40 TABLET, FILM COATED ORAL DAILY
Qty: 90 TABLET | Refills: 3 | Status: SHIPPED | OUTPATIENT
Start: 2023-10-24

## 2023-10-24 RX ORDER — SPIRONOLACTONE 50 MG/1
50 TABLET, FILM COATED ORAL DAILY
COMMUNITY
Start: 2023-10-16

## 2023-10-24 RX ORDER — GABAPENTIN 100 MG/1
100 CAPSULE ORAL EVERY EVENING
COMMUNITY
Start: 2023-08-03

## 2023-10-24 RX ORDER — FUROSEMIDE 40 MG/1
40 TABLET ORAL DAILY
COMMUNITY
Start: 2023-06-05

## 2023-10-24 RX ORDER — CARVEDILOL 6.25 MG/1
TABLET ORAL
COMMUNITY
Start: 2023-09-07

## 2023-10-24 ASSESSMENT — ENCOUNTER SYMPTOMS
ABDOMINAL PAIN: 0
WHEEZING: 0
HEMATOCHEZIA: 0
DIARRHEA: 0
SPUTUM PRODUCTION: 0
HEMATEMESIS: 0
HOARSE VOICE: 0
ORTHOPNEA: 0
COLOR CHANGE: 0
BOWEL INCONTINENCE: 0
BLURRED VISION: 0
SHORTNESS OF BREATH: 0

## 2023-10-24 NOTE — PROGRESS NOTES
[facility] CARDIOLOGY  [address] [facility], [address] [facility]  PHONE: [phone]        10/24/23        NAME:  Earnest Rayo  : 1935  MRN: [id number]       SUBJECTIVE:   Earnest Rayo is a 82 y.o. male seen for a follow up visit regarding the following: The patient has a hx of CAD,AVR,BiV PPM,and CHF. He returns for device check and follow up. Gian Duarte reports doing ok. Exam and interview were conducted with the assistance of a . Chief Complaint   Patient presents with    Coronary Artery Disease       HPI:    HPI    Past Medical History, Past Surgical History, Family history, Social History, and Medications were all reviewed with the patient today and updated as necessary. Current Outpatient Medications:     gabapentin (NEURONTIN) 100 MG capsule, Take 1 capsule by mouth every evening., Disp: , Rfl:     carvedilol (COREG) 6.25 MG tablet, TAKE 1 TABLET IN THE MORNING AND 1 TABLET IN THE EVENING. TAKE WITH MEALS., Disp: , Rfl:     furosemide (LASIX) 40 MG tablet, Take 1 tablet by mouth daily, Disp: , Rfl:     spironolactone (ALDACTONE) 50 MG tablet, Take 1 tablet by mouth daily, Disp: , Rfl:     atorvastatin (LIPITOR) 40 MG tablet, Take 1 tablet by mouth daily, Disp: 90 tablet, Rfl: 3    tamsulosin (FLOMAX) 0.4 MG capsule, Take 1 capsule by mouth daily, Disp: , Rfl:     finasteride (PROSCAR) 5 MG tablet, Take 1 tablet by mouth daily, Disp: , Rfl:     meclizine (ANTIVERT) 25 MG tablet, Take by mouth 3 times daily as needed, Disp: , Rfl:     warfarin (COUMADIN) 7.5 MG tablet, Take 1 tablet by mouth every evening, Disp: , Rfl:     losartan (COZAAR) 25 MG tablet, Take 25 mg by mouth daily (Patient not taking: Reported on 10/24/2023), Disp: , Rfl:     HYDROcodone-acetaminophen (NORCO) 5-325 MG per tablet, Take 1 tablet by mouth every 6 hours as needed for Pain.  (Patient not taking: Reported on 3/28/2023), Disp: , Rfl:     diclofenac sodium (VOLTAREN)

## 2024-05-28 ENCOUNTER — OFFICE VISIT (OUTPATIENT)
Age: 89
End: 2024-05-28
Payer: MEDICARE

## 2024-05-28 VITALS
HEART RATE: 72 BPM | WEIGHT: 122 LBS | DIASTOLIC BLOOD PRESSURE: 60 MMHG | HEIGHT: 63 IN | BODY MASS INDEX: 21.62 KG/M2 | SYSTOLIC BLOOD PRESSURE: 120 MMHG

## 2024-05-28 DIAGNOSIS — E78.5 HYPERLIPIDEMIA, UNSPECIFIED HYPERLIPIDEMIA TYPE: ICD-10-CM

## 2024-05-28 DIAGNOSIS — Z95.2 S/P AVR: ICD-10-CM

## 2024-05-28 DIAGNOSIS — Z95.1 HX OF CABG: ICD-10-CM

## 2024-05-28 DIAGNOSIS — I25.811 ATHEROSCLEROSIS OF NATIVE CORONARY ARTERY OF TRANSPLANTED HEART WITHOUT ANGINA PECTORIS: Primary | ICD-10-CM

## 2024-05-28 DIAGNOSIS — I10 ESSENTIAL HYPERTENSION: ICD-10-CM

## 2024-05-28 DIAGNOSIS — Z95.0 BIVENTRICULAR CARDIAC PACEMAKER IN SITU: ICD-10-CM

## 2024-05-28 PROCEDURE — 93000 ELECTROCARDIOGRAM COMPLETE: CPT | Performed by: INTERNAL MEDICINE

## 2024-05-28 PROCEDURE — 1123F ACP DISCUSS/DSCN MKR DOCD: CPT | Performed by: INTERNAL MEDICINE

## 2024-05-28 PROCEDURE — 99214 OFFICE O/P EST MOD 30 MIN: CPT | Performed by: INTERNAL MEDICINE

## 2024-05-28 PROCEDURE — G8427 DOCREV CUR MEDS BY ELIG CLIN: HCPCS | Performed by: INTERNAL MEDICINE

## 2024-05-28 PROCEDURE — 1036F TOBACCO NON-USER: CPT | Performed by: INTERNAL MEDICINE

## 2024-05-28 PROCEDURE — G8420 CALC BMI NORM PARAMETERS: HCPCS | Performed by: INTERNAL MEDICINE

## 2024-05-28 RX ORDER — CARVEDILOL 6.25 MG/1
6.25 TABLET ORAL 2 TIMES DAILY
Qty: 180 TABLET | Refills: 3 | Status: SHIPPED | OUTPATIENT
Start: 2024-05-28

## 2024-05-28 RX ORDER — ATORVASTATIN CALCIUM 40 MG/1
40 TABLET, FILM COATED ORAL DAILY
Qty: 90 TABLET | Refills: 3 | Status: SHIPPED | OUTPATIENT
Start: 2024-05-28

## 2024-05-28 RX ORDER — LOSARTAN POTASSIUM 25 MG/1
25 TABLET ORAL DAILY
Qty: 90 TABLET | Refills: 3 | Status: SHIPPED | OUTPATIENT
Start: 2024-05-28

## 2024-05-28 ASSESSMENT — ENCOUNTER SYMPTOMS
SHORTNESS OF BREATH: 0
CHEST TIGHTNESS: 0

## 2024-05-28 NOTE — PROGRESS NOTES
by PCP.Continue Lipitor with target LDL<70.  -     atorvastatin (LIPITOR) 40 MG tablet; Take 1 tablet by mouth daily    Essential hypertension:Stable and at goal.Continue Coreg,Losartan,and home BP monitoring.  -     losartan (COZAAR) 25 MG tablet; Take 1 tablet by mouth daily  -     carvedilol (COREG) 6.25 MG tablet; Take 1 tablet by mouth 2 times daily    Biventricular cardiac pacemaker in situ:Stable.Device clinic as scheduled.    S/P AVR:Stable.          Disposition:    Return in about 6 months (around 11/28/2024).                KEENA ECHEVARRIA MD  5/28/2024  2:22 PM

## 2024-07-12 PROCEDURE — 93296 REM INTERROG EVL PM/IDS: CPT | Performed by: INTERNAL MEDICINE

## 2024-07-12 PROCEDURE — 93294 REM INTERROG EVL PM/LDLS PM: CPT | Performed by: INTERNAL MEDICINE
